# Patient Record
Sex: FEMALE | Race: WHITE | Employment: OTHER
[De-identification: names, ages, dates, MRNs, and addresses within clinical notes are randomized per-mention and may not be internally consistent; named-entity substitution may affect disease eponyms.]

---

## 2017-01-03 ENCOUNTER — CARE COORDINATION (OUTPATIENT)
Dept: CARE COORDINATION | Facility: CLINIC | Age: 67
End: 2017-01-03

## 2017-01-06 ENCOUNTER — CARE COORDINATION (OUTPATIENT)
Dept: CARE COORDINATION | Facility: CLINIC | Age: 67
End: 2017-01-06

## 2017-01-19 ENCOUNTER — TELEPHONE (OUTPATIENT)
Dept: FAMILY MEDICINE CLINIC | Facility: CLINIC | Age: 67
End: 2017-01-19

## 2017-02-02 ENCOUNTER — TELEPHONE (OUTPATIENT)
Dept: FAMILY MEDICINE CLINIC | Facility: CLINIC | Age: 67
End: 2017-02-02

## 2017-02-03 ENCOUNTER — OFFICE VISIT (OUTPATIENT)
Dept: FAMILY MEDICINE CLINIC | Facility: CLINIC | Age: 67
End: 2017-02-03

## 2017-02-03 VITALS
HEART RATE: 94 BPM | RESPIRATION RATE: 18 BRPM | DIASTOLIC BLOOD PRESSURE: 70 MMHG | OXYGEN SATURATION: 97 % | SYSTOLIC BLOOD PRESSURE: 117 MMHG

## 2017-02-03 DIAGNOSIS — T14.8XXA TENDON TEAR: Primary | ICD-10-CM

## 2017-02-03 DIAGNOSIS — I82.A11 ACUTE DEEP VEIN THROMBOSIS (DVT) OF AXILLARY VEIN OF RIGHT UPPER EXTREMITY (HCC): ICD-10-CM

## 2017-02-03 DIAGNOSIS — M54.16 LUMBAR RADICULAR PAIN: ICD-10-CM

## 2017-02-03 LAB
INTERNATIONAL NORMALIZATION RATIO, POC: 36.2
PROTHROMBIN TIME, POC: 3

## 2017-02-03 PROCEDURE — 85610 PROTHROMBIN TIME: CPT

## 2017-02-03 PROCEDURE — 99213 OFFICE O/P EST LOW 20 MIN: CPT

## 2017-02-03 ASSESSMENT — ENCOUNTER SYMPTOMS
COUGH: 1
BACK PAIN: 1

## 2017-02-09 ENCOUNTER — TELEPHONE (OUTPATIENT)
Dept: FAMILY MEDICINE CLINIC | Facility: CLINIC | Age: 67
End: 2017-02-09

## 2017-02-10 ENCOUNTER — TELEPHONE (OUTPATIENT)
Dept: FAMILY MEDICINE CLINIC | Facility: CLINIC | Age: 67
End: 2017-02-10

## 2017-02-16 ENCOUNTER — CARE COORDINATION (OUTPATIENT)
Dept: CARE COORDINATION | Facility: CLINIC | Age: 67
End: 2017-02-16

## 2017-02-17 ENCOUNTER — TELEPHONE (OUTPATIENT)
Dept: FAMILY MEDICINE CLINIC | Facility: CLINIC | Age: 67
End: 2017-02-17

## 2017-03-10 ENCOUNTER — CARE COORDINATION (OUTPATIENT)
Dept: CARE COORDINATION | Facility: CLINIC | Age: 67
End: 2017-03-10

## 2017-03-10 DIAGNOSIS — S12.9XXD: ICD-10-CM

## 2017-03-10 DIAGNOSIS — E78.00 PURE HYPERCHOLESTEROLEMIA: ICD-10-CM

## 2017-03-10 DIAGNOSIS — I82.621 ARM DVT (DEEP VENOUS THROMBOEMBOLISM), ACUTE, RIGHT (HCC): ICD-10-CM

## 2017-03-10 DIAGNOSIS — I10 ESSENTIAL HYPERTENSION: ICD-10-CM

## 2017-03-10 RX ORDER — ATORVASTATIN CALCIUM 20 MG/1
20 TABLET, FILM COATED ORAL NIGHTLY
Qty: 90 TABLET | Refills: 5 | Status: SHIPPED | OUTPATIENT
Start: 2017-03-10 | End: 2017-03-13 | Stop reason: SDUPTHER

## 2017-03-10 RX ORDER — LORAZEPAM 1 MG/1
1 TABLET ORAL 2 TIMES DAILY PRN
Qty: 60 TABLET | Refills: 0 | Status: SHIPPED | OUTPATIENT
Start: 2017-03-10 | End: 2017-04-25 | Stop reason: SDUPTHER

## 2017-03-10 RX ORDER — POTASSIUM CHLORIDE 20 MEQ/1
TABLET, EXTENDED RELEASE ORAL
Qty: 180 TABLET | Refills: 3 | Status: SHIPPED | OUTPATIENT
Start: 2017-03-10 | End: 2017-03-13 | Stop reason: SDUPTHER

## 2017-03-10 RX ORDER — CHLORTHALIDONE 25 MG/1
25 TABLET ORAL DAILY
Qty: 90 TABLET | Refills: 5 | Status: SHIPPED | OUTPATIENT
Start: 2017-03-10 | End: 2017-03-13 | Stop reason: SDUPTHER

## 2017-03-13 DIAGNOSIS — E78.00 PURE HYPERCHOLESTEROLEMIA: ICD-10-CM

## 2017-03-13 DIAGNOSIS — I10 ESSENTIAL HYPERTENSION: ICD-10-CM

## 2017-03-13 DIAGNOSIS — K21.9 GASTROESOPHAGEAL REFLUX DISEASE, ESOPHAGITIS PRESENCE NOT SPECIFIED: ICD-10-CM

## 2017-03-13 RX ORDER — POTASSIUM CHLORIDE 20 MEQ/1
TABLET, EXTENDED RELEASE ORAL
Qty: 180 TABLET | Refills: 3 | Status: SHIPPED | OUTPATIENT
Start: 2017-03-13 | End: 2018-10-22

## 2017-03-13 RX ORDER — CHLORTHALIDONE 25 MG/1
25 TABLET ORAL DAILY
Qty: 90 TABLET | Refills: 5 | Status: SHIPPED | OUTPATIENT
Start: 2017-03-13 | End: 2018-10-22

## 2017-03-13 RX ORDER — ATORVASTATIN CALCIUM 20 MG/1
20 TABLET, FILM COATED ORAL NIGHTLY
Qty: 90 TABLET | Refills: 5 | Status: SHIPPED | OUTPATIENT
Start: 2017-03-13

## 2017-03-13 RX ORDER — OMEPRAZOLE 20 MG/1
20 CAPSULE, DELAYED RELEASE ORAL DAILY
Qty: 90 CAPSULE | Refills: 5 | Status: SHIPPED | OUTPATIENT
Start: 2017-03-13 | End: 2019-09-05 | Stop reason: DRUGHIGH

## 2017-03-15 ENCOUNTER — TELEPHONE (OUTPATIENT)
Dept: FAMILY MEDICINE CLINIC | Age: 67
End: 2017-03-15

## 2017-03-29 ENCOUNTER — OFFICE VISIT (OUTPATIENT)
Dept: FAMILY MEDICINE CLINIC | Age: 67
End: 2017-03-29
Payer: MEDICARE

## 2017-03-29 VITALS
BODY MASS INDEX: 32.28 KG/M2 | SYSTOLIC BLOOD PRESSURE: 110 MMHG | HEART RATE: 78 BPM | WEIGHT: 194 LBS | OXYGEN SATURATION: 95 % | DIASTOLIC BLOOD PRESSURE: 78 MMHG

## 2017-03-29 DIAGNOSIS — M25.512 CHRONIC PAIN OF BOTH SHOULDERS: Primary | ICD-10-CM

## 2017-03-29 DIAGNOSIS — M25.511 CHRONIC PAIN OF BOTH SHOULDERS: Primary | ICD-10-CM

## 2017-03-29 DIAGNOSIS — M47.26 OSTEOARTHRITIS OF SPINE WITH RADICULOPATHY, LUMBAR REGION: Chronic | ICD-10-CM

## 2017-03-29 DIAGNOSIS — G89.29 CHRONIC PAIN OF BOTH SHOULDERS: Primary | ICD-10-CM

## 2017-03-29 PROCEDURE — 99213 OFFICE O/P EST LOW 20 MIN: CPT

## 2017-03-29 ASSESSMENT — ENCOUNTER SYMPTOMS: BACK PAIN: 1

## 2017-04-10 ENCOUNTER — HOSPITAL ENCOUNTER (OUTPATIENT)
Dept: NON INVASIVE DIAGNOSTICS | Age: 67
Discharge: HOME | End: 2017-04-10
Payer: MEDICARE

## 2017-04-10 ENCOUNTER — HOSPITAL ENCOUNTER (OUTPATIENT)
Dept: NON INVASIVE DIAGNOSTICS | Age: 67
End: 2017-04-10
Payer: MEDICARE

## 2017-04-10 ENCOUNTER — HOSPITAL ENCOUNTER (OUTPATIENT)
Age: 67
Discharge: HOME | End: 2017-04-10
Payer: MEDICARE

## 2017-04-10 DIAGNOSIS — E78.5 DYSLIPIDEMIA: Primary | ICD-10-CM

## 2017-04-10 LAB
CHOLESTEROL/HDL RATIO: 3.4
CHOLESTEROL: 158 MG/DL
HDLC SERPL-MCNC: 47 MG/DL
LDL CHOLESTEROL: 67 MG/DL (ref 0–130)
LV EF: 60 %
LVEF MODALITY: NORMAL
TRIGL SERPL-MCNC: 222 MG/DL
VLDLC SERPL CALC-MCNC: ABNORMAL MG/DL (ref 1–30)

## 2017-04-10 PROCEDURE — 78452 HT MUSCLE IMAGE SPECT MULT: CPT

## 2017-04-10 PROCEDURE — 80061 LIPID PANEL: CPT

## 2017-04-10 PROCEDURE — 3430000000 HC RX DIAGNOSTIC RADIOPHARMACEUTICAL: Performed by: INTERNAL MEDICINE

## 2017-04-10 PROCEDURE — 36415 COLL VENOUS BLD VENIPUNCTURE: CPT

## 2017-04-10 PROCEDURE — A9500 TC99M SESTAMIBI: HCPCS | Performed by: INTERNAL MEDICINE

## 2017-04-10 PROCEDURE — 93306 TTE W/DOPPLER COMPLETE: CPT

## 2017-04-10 RX ADMIN — Medication 32.4 MILLICURIE: at 12:35

## 2017-04-11 ENCOUNTER — HOSPITAL ENCOUNTER (OUTPATIENT)
Dept: NON INVASIVE DIAGNOSTICS | Age: 67
Discharge: HOME | End: 2017-04-11
Payer: MEDICARE

## 2017-04-11 PROCEDURE — 93225 XTRNL ECG REC<48 HRS REC: CPT

## 2017-04-11 PROCEDURE — A9500 TC99M SESTAMIBI: HCPCS | Performed by: INTERNAL MEDICINE

## 2017-04-11 PROCEDURE — 6360000002 HC RX W HCPCS: Performed by: FAMILY MEDICINE

## 2017-04-11 PROCEDURE — 93017 CV STRESS TEST TRACING ONLY: CPT

## 2017-04-11 PROCEDURE — 3430000000 HC RX DIAGNOSTIC RADIOPHARMACEUTICAL: Performed by: INTERNAL MEDICINE

## 2017-04-11 RX ADMIN — Medication 33.7 MILLICURIE: at 09:37

## 2017-04-11 RX ADMIN — REGADENOSON 0.4 MG: 0.08 INJECTION, SOLUTION INTRAVENOUS at 09:37

## 2017-04-13 ENCOUNTER — HOSPITAL ENCOUNTER (OUTPATIENT)
Dept: GENERAL RADIOLOGY | Age: 67
Discharge: HOME | End: 2017-04-13
Payer: MEDICARE

## 2017-04-13 ENCOUNTER — HOSPITAL ENCOUNTER (OUTPATIENT)
Age: 67
Discharge: HOME | End: 2017-04-13
Payer: MEDICARE

## 2017-04-13 DIAGNOSIS — M47.26 OSTEOARTHRITIS OF SPINE WITH RADICULOPATHY, LUMBAR REGION: Chronic | ICD-10-CM

## 2017-04-13 PROCEDURE — 73030 X-RAY EXAM OF SHOULDER: CPT

## 2017-04-18 ENCOUNTER — TELEPHONE (OUTPATIENT)
Dept: FAMILY MEDICINE CLINIC | Age: 67
End: 2017-04-18

## 2017-04-21 ENCOUNTER — CARE COORDINATION (OUTPATIENT)
Dept: CARE COORDINATION | Age: 67
End: 2017-04-21

## 2017-04-25 ENCOUNTER — OFFICE VISIT (OUTPATIENT)
Dept: FAMILY MEDICINE CLINIC | Age: 67
End: 2017-04-25
Payer: MEDICARE

## 2017-04-25 VITALS
SYSTOLIC BLOOD PRESSURE: 118 MMHG | DIASTOLIC BLOOD PRESSURE: 78 MMHG | WEIGHT: 196 LBS | HEART RATE: 63 BPM | OXYGEN SATURATION: 98 % | BODY MASS INDEX: 32.62 KG/M2

## 2017-04-25 DIAGNOSIS — G89.29 CHRONIC PAIN OF BOTH SHOULDERS: Primary | ICD-10-CM

## 2017-04-25 DIAGNOSIS — I82.621 ARM DVT (DEEP VENOUS THROMBOEMBOLISM), ACUTE, RIGHT (HCC): ICD-10-CM

## 2017-04-25 DIAGNOSIS — M25.512 CHRONIC PAIN OF BOTH SHOULDERS: Primary | ICD-10-CM

## 2017-04-25 DIAGNOSIS — M54.16 LUMBAR RADICULAR PAIN: ICD-10-CM

## 2017-04-25 DIAGNOSIS — Z23 NEED FOR PROPHYLACTIC VACCINATION WITH STREPTOCOCCUS PNEUMONIAE (PNEUMOCOCCUS) AND INFLUENZA VACCINES: ICD-10-CM

## 2017-04-25 DIAGNOSIS — S12.9XXD: ICD-10-CM

## 2017-04-25 DIAGNOSIS — M25.511 CHRONIC PAIN OF BOTH SHOULDERS: Primary | ICD-10-CM

## 2017-04-25 PROCEDURE — G0009 ADMIN PNEUMOCOCCAL VACCINE: HCPCS

## 2017-04-25 PROCEDURE — 90670 PCV13 VACCINE IM: CPT

## 2017-04-25 PROCEDURE — 99213 OFFICE O/P EST LOW 20 MIN: CPT

## 2017-04-25 RX ORDER — ADHESIVE BANDAGE 3/4"
1 BANDAGE TOPICAL 2 TIMES DAILY
Qty: 1 EACH | Refills: 0 | Status: SHIPPED | OUTPATIENT
Start: 2017-04-25

## 2017-04-25 RX ORDER — LORAZEPAM 1 MG/1
1 TABLET ORAL 2 TIMES DAILY PRN
Qty: 60 TABLET | Refills: 0 | Status: SHIPPED | OUTPATIENT
Start: 2017-04-25 | End: 2017-06-07 | Stop reason: SDUPTHER

## 2017-04-25 RX ORDER — NITROGLYCERIN 0.4 MG/1
TABLET SUBLINGUAL
COMMUNITY
Start: 2017-04-03 | End: 2019-09-05

## 2017-04-25 ASSESSMENT — ENCOUNTER SYMPTOMS
RESPIRATORY NEGATIVE: 1
BACK PAIN: 1

## 2017-05-02 DIAGNOSIS — I82.A11 DEEP VEIN THROMBOSIS (DVT) OF AXILLARY VEIN OF RIGHT UPPER EXTREMITY, UNSPECIFIED CHRONICITY (HCC): Primary | ICD-10-CM

## 2017-05-02 RX ORDER — WARFARIN SODIUM 2.5 MG/1
TABLET ORAL
Qty: 90 TABLET | Refills: 3 | Status: ON HOLD | OUTPATIENT
Start: 2017-05-02 | End: 2019-09-03 | Stop reason: HOSPADM

## 2017-05-24 ENCOUNTER — TELEPHONE (OUTPATIENT)
Dept: FAMILY MEDICINE CLINIC | Age: 67
End: 2017-05-24

## 2017-05-24 DIAGNOSIS — I82.A11 ACUTE DEEP VEIN THROMBOSIS (DVT) OF AXILLARY VEIN OF RIGHT UPPER EXTREMITY (HCC): ICD-10-CM

## 2017-05-24 DIAGNOSIS — I82.A11 ACUTE DEEP VEIN THROMBOSIS (DVT) OF AXILLARY VEIN OF RIGHT UPPER EXTREMITY (HCC): Primary | ICD-10-CM

## 2017-05-24 LAB
INR BLD: 2.5
PROTIME: NORMAL SECONDS

## 2017-05-25 ENCOUNTER — CARE COORDINATION (OUTPATIENT)
Dept: CARE COORDINATION | Age: 67
End: 2017-05-25

## 2017-06-02 ENCOUNTER — CARE COORDINATION (OUTPATIENT)
Dept: CARE COORDINATION | Age: 67
End: 2017-06-02

## 2017-06-07 ENCOUNTER — OFFICE VISIT (OUTPATIENT)
Dept: FAMILY MEDICINE CLINIC | Age: 67
End: 2017-06-07
Payer: MEDICARE

## 2017-06-07 VITALS
BODY MASS INDEX: 33.12 KG/M2 | DIASTOLIC BLOOD PRESSURE: 78 MMHG | HEART RATE: 78 BPM | OXYGEN SATURATION: 96 % | WEIGHT: 199 LBS | TEMPERATURE: 98 F | SYSTOLIC BLOOD PRESSURE: 118 MMHG

## 2017-06-07 DIAGNOSIS — G89.29 CHRONIC LOW BACK PAIN, UNSPECIFIED BACK PAIN LATERALITY, WITH SCIATICA PRESENCE UNSPECIFIED: Primary | ICD-10-CM

## 2017-06-07 DIAGNOSIS — M54.5 CHRONIC LOW BACK PAIN, UNSPECIFIED BACK PAIN LATERALITY, WITH SCIATICA PRESENCE UNSPECIFIED: Primary | ICD-10-CM

## 2017-06-07 DIAGNOSIS — I82.621 ARM DVT (DEEP VENOUS THROMBOEMBOLISM), ACUTE, RIGHT (HCC): ICD-10-CM

## 2017-06-07 DIAGNOSIS — S12.9XXD: ICD-10-CM

## 2017-06-07 PROCEDURE — 99213 OFFICE O/P EST LOW 20 MIN: CPT

## 2017-06-07 RX ORDER — HYDROCODONE BITARTRATE AND ACETAMINOPHEN 5; 325 MG/1; MG/1
TABLET ORAL
Qty: 90 TABLET | Refills: 0 | Status: SHIPPED | OUTPATIENT
Start: 2017-06-07 | End: 2017-07-10 | Stop reason: SDUPTHER

## 2017-06-07 RX ORDER — LORAZEPAM 1 MG/1
1 TABLET ORAL 2 TIMES DAILY PRN
Qty: 60 TABLET | Refills: 0 | Status: SHIPPED | OUTPATIENT
Start: 2017-06-07 | End: 2019-09-05 | Stop reason: DRUGHIGH

## 2017-07-10 DIAGNOSIS — G89.29 CHRONIC LOW BACK PAIN, UNSPECIFIED BACK PAIN LATERALITY, WITH SCIATICA PRESENCE UNSPECIFIED: ICD-10-CM

## 2017-07-10 DIAGNOSIS — I82.621 ARM DVT (DEEP VENOUS THROMBOEMBOLISM), ACUTE, RIGHT (HCC): ICD-10-CM

## 2017-07-10 DIAGNOSIS — M54.5 CHRONIC LOW BACK PAIN, UNSPECIFIED BACK PAIN LATERALITY, WITH SCIATICA PRESENCE UNSPECIFIED: ICD-10-CM

## 2017-07-10 DIAGNOSIS — S12.9XXD: ICD-10-CM

## 2017-07-18 ENCOUNTER — OFFICE VISIT (OUTPATIENT)
Dept: FAMILY MEDICINE CLINIC | Age: 67
End: 2017-07-18
Payer: MEDICARE

## 2017-07-18 VITALS
HEART RATE: 74 BPM | DIASTOLIC BLOOD PRESSURE: 80 MMHG | WEIGHT: 196 LBS | BODY MASS INDEX: 32.62 KG/M2 | SYSTOLIC BLOOD PRESSURE: 110 MMHG | OXYGEN SATURATION: 96 %

## 2017-07-18 DIAGNOSIS — M54.5 CHRONIC LOW BACK PAIN, UNSPECIFIED BACK PAIN LATERALITY, WITH SCIATICA PRESENCE UNSPECIFIED: Primary | ICD-10-CM

## 2017-07-18 DIAGNOSIS — S12.9XXD: ICD-10-CM

## 2017-07-18 DIAGNOSIS — E53.8 B12 DEFICIENCY: ICD-10-CM

## 2017-07-18 DIAGNOSIS — I82.621 ARM DVT (DEEP VENOUS THROMBOEMBOLISM), ACUTE, RIGHT (HCC): ICD-10-CM

## 2017-07-18 DIAGNOSIS — G89.29 CHRONIC LOW BACK PAIN, UNSPECIFIED BACK PAIN LATERALITY, WITH SCIATICA PRESENCE UNSPECIFIED: Primary | ICD-10-CM

## 2017-07-18 PROBLEM — I82.629 ARM DVT (DEEP VENOUS THROMBOEMBOLISM), ACUTE (HCC): Status: ACTIVE | Noted: 2017-07-18

## 2017-07-18 LAB
INTERNATIONAL NORMALIZATION RATIO, POC: 1.4
PROTHROMBIN TIME, POC: 16.7

## 2017-07-18 PROCEDURE — 85610 PROTHROMBIN TIME: CPT

## 2017-07-18 PROCEDURE — 99213 OFFICE O/P EST LOW 20 MIN: CPT

## 2017-07-18 RX ORDER — HYDROCODONE BITARTRATE AND ACETAMINOPHEN 5; 325 MG/1; MG/1
TABLET ORAL
Qty: 90 TABLET | Refills: 0 | Status: SHIPPED | OUTPATIENT
Start: 2017-07-18 | End: 2019-04-23

## 2017-07-18 RX ORDER — CYANOCOBALAMIN 1000 UG/ML
1000 INJECTION INTRAMUSCULAR; SUBCUTANEOUS
Qty: 1 ML | Refills: 5 | Status: ON HOLD | OUTPATIENT
Start: 2017-07-18 | End: 2019-09-03 | Stop reason: HOSPADM

## 2017-07-18 RX ORDER — HYDROCODONE BITARTRATE AND ACETAMINOPHEN 5; 325 MG/1; MG/1
TABLET ORAL
Qty: 90 TABLET | Refills: 0 | Status: SHIPPED | OUTPATIENT
Start: 2017-07-18 | End: 2017-07-18 | Stop reason: SDUPTHER

## 2017-07-18 ASSESSMENT — ENCOUNTER SYMPTOMS
NAUSEA: 0
BACK PAIN: 1
EYE REDNESS: 0
DIARRHEA: 0
EYE DISCHARGE: 0
RHINORRHEA: 0
COUGH: 0
WHEEZING: 0
SHORTNESS OF BREATH: 0
VOMITING: 0
SORE THROAT: 0
ABDOMINAL PAIN: 0

## 2017-09-01 ENCOUNTER — HOSPITAL ENCOUNTER (OUTPATIENT)
Dept: CARDIAC CATH/INVASIVE PROCEDURES | Age: 67
Discharge: HOME | End: 2017-09-01
Payer: MEDICARE

## 2017-09-01 VITALS
OXYGEN SATURATION: 95 % | HEART RATE: 71 BPM | SYSTOLIC BLOOD PRESSURE: 91 MMHG | WEIGHT: 197 LBS | DIASTOLIC BLOOD PRESSURE: 54 MMHG | BODY MASS INDEX: 32.82 KG/M2 | TEMPERATURE: 97.9 F | HEIGHT: 65 IN | RESPIRATION RATE: 17 BRPM

## 2017-09-01 LAB
GFR NON-AFRICAN AMERICAN: >60 ML/MIN
GFR SERPL CREATININE-BSD FRML MDRD: >60 ML/MIN
GFR SERPL CREATININE-BSD FRML MDRD: NORMAL ML/MIN/{1.73_M2}
GLUCOSE BLD-MCNC: 98 MG/DL (ref 74–100)
PLATELET # BLD: 244 K/UL (ref 140–450)
POC CHLORIDE: 103 MMOL/L (ref 98–107)
POC CREATININE: 0.71 MG/DL (ref 0.51–1.19)
POC HEMATOCRIT: 41 % (ref 36–46)
POC HEMOGLOBIN: 13.9 G/DL (ref 12–16)
POC INR: 1.2
POC IONIZED CALCIUM: 1.14 MMOL/L (ref 1.15–1.33)
POC LACTIC ACID: 0.94 MMOL/L (ref 0.56–1.39)
POC POTASSIUM: 3.5 MMOL/L (ref 3.5–4.5)
POC SODIUM: 143 MMOL/L (ref 138–146)
PROTHROMBIN TIME, POC: 14.6 SEC (ref 10.4–14.2)

## 2017-09-01 PROCEDURE — 7100000010 HC PHASE II RECOVERY - FIRST 15 MIN

## 2017-09-01 PROCEDURE — C1725 CATH, TRANSLUMIN NON-LASER: HCPCS

## 2017-09-01 PROCEDURE — C1894 INTRO/SHEATH, NON-LASER: HCPCS

## 2017-09-01 PROCEDURE — 83605 ASSAY OF LACTIC ACID: CPT

## 2017-09-01 PROCEDURE — C1760 CLOSURE DEV, VASC: HCPCS

## 2017-09-01 PROCEDURE — 6360000002 HC RX W HCPCS

## 2017-09-01 PROCEDURE — 93458 L HRT ARTERY/VENTRICLE ANGIO: CPT | Performed by: INTERNAL MEDICINE

## 2017-09-01 PROCEDURE — C1769 GUIDE WIRE: HCPCS

## 2017-09-01 PROCEDURE — 84132 ASSAY OF SERUM POTASSIUM: CPT

## 2017-09-01 PROCEDURE — 84295 ASSAY OF SERUM SODIUM: CPT

## 2017-09-01 PROCEDURE — 6370000000 HC RX 637 (ALT 250 FOR IP)

## 2017-09-01 PROCEDURE — 7100000011 HC PHASE II RECOVERY - ADDTL 15 MIN

## 2017-09-01 PROCEDURE — 82565 ASSAY OF CREATININE: CPT

## 2017-09-01 PROCEDURE — 85014 HEMATOCRIT: CPT

## 2017-09-01 PROCEDURE — 82435 ASSAY OF BLOOD CHLORIDE: CPT

## 2017-09-01 PROCEDURE — 82330 ASSAY OF CALCIUM: CPT

## 2017-09-01 PROCEDURE — 82947 ASSAY GLUCOSE BLOOD QUANT: CPT

## 2017-09-01 PROCEDURE — 85049 AUTOMATED PLATELET COUNT: CPT

## 2017-09-01 PROCEDURE — 85610 PROTHROMBIN TIME: CPT

## 2017-09-01 RX ORDER — ONDANSETRON 2 MG/ML
4 INJECTION INTRAMUSCULAR; INTRAVENOUS EVERY 6 HOURS PRN
Status: DISCONTINUED | OUTPATIENT
Start: 2017-09-01 | End: 2017-09-02 | Stop reason: HOSPADM

## 2017-09-01 RX ORDER — SODIUM CHLORIDE 0.9 % (FLUSH) 0.9 %
10 SYRINGE (ML) INJECTION EVERY 12 HOURS SCHEDULED
Status: DISCONTINUED | OUTPATIENT
Start: 2017-09-01 | End: 2017-09-02 | Stop reason: HOSPADM

## 2017-09-01 RX ORDER — SODIUM CHLORIDE 9 MG/ML
INJECTION, SOLUTION INTRAVENOUS CONTINUOUS
Status: DISCONTINUED | OUTPATIENT
Start: 2017-09-01 | End: 2017-09-02 | Stop reason: HOSPADM

## 2017-09-01 RX ORDER — CALCIUM CARBONATE 500(1250)
500 TABLET ORAL DAILY
COMMUNITY
End: 2019-09-05 | Stop reason: ALTCHOICE

## 2017-09-01 RX ORDER — SODIUM CHLORIDE 0.9 % (FLUSH) 0.9 %
10 SYRINGE (ML) INJECTION PRN
Status: DISCONTINUED | OUTPATIENT
Start: 2017-09-01 | End: 2017-09-02 | Stop reason: HOSPADM

## 2017-09-01 RX ORDER — ACETAMINOPHEN 325 MG/1
650 TABLET ORAL EVERY 4 HOURS PRN
Status: DISCONTINUED | OUTPATIENT
Start: 2017-09-01 | End: 2017-09-02 | Stop reason: HOSPADM

## 2017-09-01 RX ADMIN — ACETAMINOPHEN 650 MG: 325 TABLET ORAL at 14:38

## 2017-09-01 RX ADMIN — SODIUM CHLORIDE: 9 INJECTION, SOLUTION INTRAVENOUS at 11:12

## 2017-09-01 ASSESSMENT — PAIN SCALES - GENERAL
PAINLEVEL_OUTOF10: 3
PAINLEVEL_OUTOF10: 6

## 2017-09-01 ASSESSMENT — PAIN DESCRIPTION - LOCATION: LOCATION: BACK

## 2017-09-01 ASSESSMENT — PAIN DESCRIPTION - DESCRIPTORS: DESCRIPTORS: ACHING

## 2017-09-01 ASSESSMENT — PAIN DESCRIPTION - PAIN TYPE: TYPE: CHRONIC PAIN

## 2018-07-11 ENCOUNTER — ANTI-COAG VISIT (OUTPATIENT)
Dept: PHARMACY | Age: 68
End: 2018-07-11

## 2018-07-11 DIAGNOSIS — Z79.01 LONG-TERM (CURRENT) USE OF ANTICOAGULANTS: ICD-10-CM

## 2018-07-11 NOTE — PROGRESS NOTES
Dr Jhon Lopez has been managing INR as patient does not like to drive into clinic. Patient discharged from clinic.   Discharge letter faxed to Dr Narendra Johnson, PharmD 7/11/2018 1:43 PM

## 2018-07-30 ENCOUNTER — HOSPITAL ENCOUNTER (OUTPATIENT)
Dept: PAIN MANAGEMENT | Age: 68
Discharge: HOME | End: 2018-07-30
Payer: MEDICARE

## 2018-07-30 VITALS
BODY MASS INDEX: 31.49 KG/M2 | DIASTOLIC BLOOD PRESSURE: 75 MMHG | HEART RATE: 71 BPM | HEIGHT: 65 IN | RESPIRATION RATE: 20 BRPM | OXYGEN SATURATION: 97 % | SYSTOLIC BLOOD PRESSURE: 135 MMHG | WEIGHT: 189 LBS | TEMPERATURE: 97.5 F

## 2018-07-30 DIAGNOSIS — M46.1 SACROILIITIS (HCC): Primary | ICD-10-CM

## 2018-07-30 DIAGNOSIS — M47.26 OSTEOARTHRITIS OF SPINE WITH RADICULOPATHY, LUMBAR REGION: ICD-10-CM

## 2018-07-30 DIAGNOSIS — M47.816 LUMBAR FACET ARTHROPATHY: ICD-10-CM

## 2018-07-30 DIAGNOSIS — M54.16 LUMBAR RADICULAR PAIN: ICD-10-CM

## 2018-07-30 DIAGNOSIS — M51.36 DDD (DEGENERATIVE DISC DISEASE), LUMBAR: ICD-10-CM

## 2018-07-30 DIAGNOSIS — M96.1 FAILED BACK SYNDROME: ICD-10-CM

## 2018-07-30 PROCEDURE — 99204 OFFICE O/P NEW MOD 45 MIN: CPT

## 2018-07-30 PROCEDURE — 99204 OFFICE O/P NEW MOD 45 MIN: CPT | Performed by: PAIN MEDICINE

## 2018-07-30 PROCEDURE — 80307 DRUG TEST PRSMV CHEM ANLYZR: CPT

## 2018-07-30 ASSESSMENT — ENCOUNTER SYMPTOMS
NAUSEA: 0
DIARRHEA: 1
BLURRED VISION: 0
CONSTIPATION: 0
VOMITING: 0
RESPIRATORY NEGATIVE: 1
BACK PAIN: 1
BOWEL INCONTINENCE: 0
HEARTBURN: 1

## 2018-07-30 ASSESSMENT — PAIN DESCRIPTION - PAIN TYPE: TYPE: CHRONIC PAIN

## 2018-07-30 ASSESSMENT — PAIN SCALES - GENERAL: PAINLEVEL_OUTOF10: 4

## 2018-07-30 ASSESSMENT — PAIN DESCRIPTION - ORIENTATION: ORIENTATION: MID;LOWER

## 2018-07-30 ASSESSMENT — PAIN DESCRIPTION - FREQUENCY: FREQUENCY: CONTINUOUS

## 2018-07-30 ASSESSMENT — PAIN DESCRIPTION - PROGRESSION: CLINICAL_PROGRESSION: GRADUALLY WORSENING

## 2018-07-30 ASSESSMENT — PAIN DESCRIPTION - ONSET: ONSET: ON-GOING

## 2018-07-30 ASSESSMENT — PAIN DESCRIPTION - LOCATION: LOCATION: BACK

## 2018-07-30 NOTE — PROGRESS NOTES
Back  Pain Orientation: Mid, Lower  Pain Radiating Towards: radiates through hips and down bilateral legsto toes  Pain Descriptors: Constant, Penetrating, Tightness  Pain Frequency: Continuous  Pain Onset: On-going  Clinical Progression: Gradually worsening  Effect of Pain on Daily Activities: walking \"legs just don't want to go\" steps are difficult, always uses cane, has walker at home, also uses WC when legs are really tired,   Patient's Stated Pain Goal: 1 (increase activity and decrease pain)  Pain Intervention(s): Medication (see eMar), Heat applied, Cold applied, Repositioned, Rest, Elevation                    ADVERSE MEDICATION EFFECTS:   Constipation: no  Bowel Regimen: No:   Diet: common adult  Appetite:  ok  Sedation:  no  Urinary Retention: no    FOCUSED PAIN SCALE:  Highest : 10  Lowest :4  Average: Range-6  When and What  was your last procedure:    1 back surgeries in Oct 2016, Jan 2017  Was your procedure effective:  yes    ACTIVITY/SOCIAL/EMOTIONAL:  Sleep Pattern: 4 hours per night. nightime awakenings  Energy Level:  Tired/Fatigued  Currently attending Physical Therapy:  No  Home Exercises: daily   Stretch and stregthening  Mobility: uses cane/walker and wheelchair  Currently seeing a Psychiatrist or Psychologist:  No  Emotional Issues: normal   Mood: appropriate     ABERRANT BEHAVIORS SINCE LAST VISIT:  Have you ever been treated in another Pain Clinic no  Refills for prescriptions appropriate: not applicable  Lost rx/pills: not applicable  Taking more medication than prescribed:  not applicable  Are you receiving PAIN medications from  other doctors: not applicable  Last Urine/Serum Drug Screen :  Was Serum/UDS as anticipated?  not applicable  Brought pill bottles in :not applicable   Was Pill count appropriate? :not applicable   Are currently pregnant? not applicable  Recent ER visits: No             Past Medical History      Diagnosis Date    Anxiety     Asthma     Chronic back pain     Depression     YEARS AGO NO RECENT PROBLEMS    Difficult intravenous access     AT TIMES    Diverticulitis     [facility]     Right upper arm    Dyslexia     GERD (gastroesophageal reflux disease) 2014    ON RX    Headache(784.0)     Hx of blood clots 2001    LT UPPER LEG TREATED WITH INJECTIONS THEN ORAL BLOOD THINNER FOR 1 YR    Hyperlipidemia 2014    ON RX    Hypertension 2014    ON RX    Migraine     Mitral valve prolapse 1970    NO SYMPTOMS    Sleep apnea 2006    ON MACHINE HAD SURGERY-NOW RESOLVED    Wears dentures     UPPER    Wears glasses        Surgical History  Past Surgical History:   Procedure Laterality Date    BACK SURGERY      LUMBAR UNSURE WHEN    BACK SURGERY  10/17/2016    hardware removal, revision posterior instrumentation    CERVICAL FUSION  2013    Anterior    COLONOSCOPY  7/16/2012    normal    HYSTERECTOMY  1986    TOTAL    SIGMOIDOSCOPY  6/20/2012    normal    SINUS SURGERY      SPINE SURGERY  2005    LUMBAR    TONSILLECTOMY  2002    UPPP, TURBINOPLASY    UPPER GASTROINTESTINAL ENDOSCOPY  8/14/2013    Bravo done, see note, hiatal hernia, Grade 1 erosive esophagitis    VARICOSE VEIN SURGERY Bilateral        Medications  Current Outpatient Prescriptions   Medication Sig Dispense Refill    calcium carbonate (OSCAL) 500 MG TABS tablet Take 500 mg by mouth daily      HYDROcodone-acetaminophen (NORCO) 5-325 MG per tablet ONE  TID  PRN.  Earliest Fill Date: 7/18/17 90 tablet 0    LORazepam (ATIVAN) 1 MG tablet Take 1 tablet by mouth 2 times daily as needed for Anxiety 60 tablet 0    warfarin (COUMADIN) 2.5 MG tablet 1 pill po daily 90 tablet 3    metoprolol tartrate (LOPRESSOR) 25 MG tablet TAKE 1 TABLET TWICE DAILY 180 tablet 3    atorvastatin (LIPITOR) 20 MG tablet Take 1 tablet by mouth nightly 90 tablet 5    omeprazole (PRILOSEC) 20 MG delayed release capsule Take 1 capsule by mouth Daily 90 capsule 5    aspirin 81 MG tablet Take 81 mg by mouth daily LAST DOSE 01/20/2017      cyanocobalamin 1000 MCG/ML injection Inject 1 mL into the muscle every 30 days 1 mL 5    nitroGLYCERIN (NITROSTAT) 0.4 MG SL tablet       Blood Pressure Monitoring (BLOOD PRESSURE CUFF) MISC 1 each by Does not apply route 2 times daily 1 each 0    potassium chloride (KLOR-CON M) 20 MEQ extended release tablet TAKE 1 TABLET TWICE DAILY 180 tablet 3    chlorthalidone (HYGROTON) 25 MG tablet Take 1 tablet by mouth daily 90 tablet 5    albuterol (PROVENTIL) (2.5 MG/3ML) 0.083% nebulizer solution INHALE THE CONTENTS OF 1 VIAL VIA NEBULIZER EVERY 6 HOURS AS NEEDED  FOR  WHEEZING (SUBSTITUTED FOR PROVENTIL) 360 mL 1     No current facility-administered medications for this encounter. Allergies  Xarelto [rivaroxaban]; Morphine; Seasonal; and Adhesive tape    Family History  family history includes Asthma in her brother; Cancer in her brother, brother, brother, father, maternal grandmother, and paternal grandmother; Heart Disease in her mother. Social History  Social History     Social History    Marital status:      Spouse name: N/A    Number of children: N/A    Years of education: N/A     Occupational History    Retired      Social History Main Topics    Smoking status: Never Smoker    Smokeless tobacco: Never Used    Alcohol use No    Drug use: No    Sexual activity: Not on file     Other Topics Concern    Not on file     Social History Narrative    No narrative on file      reports that she does not use drugs. REVIEW OF SYSTEMS:  Review of Systems   Constitutional: Positive for malaise/fatigue. Negative for chills and fever. HENT: Negative. Negative for hearing loss. Eyes: Negative for blurred vision. Cataracts   Respiratory: Negative. Cardiovascular: Negative. Negative for chest pain. Gastrointestinal: Positive for diarrhea and heartburn. Negative for bowel incontinence, constipation, nausea and vomiting.    Genitourinary: Negative. Negative for bladder incontinence, frequency and urgency. Musculoskeletal: Positive for back pain, falls and joint pain. Skin: Negative. Neurological: Positive for tingling, weakness and headaches. Endo/Heme/Allergies: Negative. Psychiatric/Behavioral:        \"ptsd\"  Deaths of  and son            GENERAL PHYSICAL EXAM:  Vitals: /75   Pulse 71   Temp 97.5 °F (36.4 °C) (Oral)   Resp 20   Ht 5' 5\" (1.651 m)   Wt 189 lb (85.7 kg)   LMP 10/05/1986 (Approximate)   SpO2 97%   BMI 31.45 kg/m² , Body mass index is 31.45 kg/m². Physical Exam   Constitutional: She is oriented to person, place, and time. She appears well-developed and well-nourished. HENT:   Head: Normocephalic and atraumatic. Eyes: Pupils are equal, round, and reactive to light. Neck: Normal range of motion. Neck supple. Cardiovascular: Normal rate and regular rhythm. Pulmonary/Chest: Effort normal and breath sounds normal.   Abdominal: Soft. She exhibits no distension. Neurological: She is alert and oriented to person, place, and time. She has normal reflexes. Skin: Skin is warm and dry. Psychiatric: She has a normal mood and affect. Her behavior is normal.    Back Exam     Tenderness   The patient is experiencing tenderness in the sacroiliac and lumbar. Range of Motion   Back extension: limited and painful. Back flexion: limited and painful. Muscle Strength   The patient has normal back strength.     Tests   Straight leg raise right: positive  Straight leg raise left: positive    Reflexes   Patellar: normal  Achilles: normal  Biceps: normal    Other   Sensation: normal  Gait: normal           Gait : normal   Surgical Scar --Present--midline  Alignment of her shoulders, scapulae and iliac crests----symmetric  Lumbar lordosis-----------Decreased  Palpation in the paraspinal revealed   Right------------------------moderate tenderness   Left--------------------------moderate tenderness  SI joints   Right----------------------- severe tenderness   Left--------------------------severe tenderness   Loc's test -------------- Right side---positive                                           Left side-----positive      Nurses Notes and Vital Signs reviewed.     DATA  Labs:  Benzodiazepines   Date Value Ref Range Status   10/15/2012 NEGATIVE NEG Final     Comment:           (Positive cutoff 200 ng/mL)                 Benzodiazepine Screen, Urine   Date Value Ref Range Status   09/03/2013 NEGATIVE NEG Final     Comment:           (Positive cutoff 200 ng/mL)                    Imaging:  Radiology Images and Reports reviewed where indicated and necessary  FINDINGS:   Posterior lumbar fusion is identified from L2 through S1.  There is   additional fusion of left SI joint.  Posterior skin staples are noted.       The alignment is intact.  No acute fracture is identified.  Moderate diffuse   degenerative disc disease is noted       Laminectomy changes are identified at L4           Impression   Uncomplicated posterior fusion from L2 through S1 as well as the left SI   joint.  No significant change is evident.       No evidence of acute fracture       Patient Active Problem List   Diagnosis    Chronic back pain    GERD (gastroesophageal reflux disease)    Hyperlipidemia    Hypertension    Failed back syndrome    DDD (degenerative disc disease), lumbar    Lumbar facet joint pain    Lumbar radicular pain    DDD (degenerative disc disease), cervical    Cervical spondylosis  S/P Cervical fusion    Osteoarthritis of spine with radiculopathy, lumbar region    Lumbar facet arthropathy    Menopausal and postmenopausal disorder    Anemia, pernicious    Hiatal hernia    Anxiety    Unstable angina (HCC)    B12 deficiency    Varicose veins of both lower extremities    S/P lumbar fusion    POONAM (obstructive sleep apnea)    Pure hypercholesterolemia    Deep vein thrombosis (DVT) of axillary vein of right upper extremity (HCC)    Paget-Schroetter syndrome    Acute deep vein thrombosis (DVT) of axillary vein of right upper extremity (HCC)    Long-term (current) use of anticoagulants    Fracture of spine, cervical, without spinal cord injury, closed (HCC)    Tendon tear    Chronic pain of both shoulders    Need for prophylactic vaccination with Streptococcus pneumoniae (Pneumococcus) and Influenza vaccines    Arm DVT (deep venous thromboembolism), acute ([address]        ASSESSMENT    Giovani Lawson is a 79 y.o. female with chronic lumbar back pain.     -Degenerative disc disease. Lumbar  -Lumbar radicular pain  -Cervical Spondylosis  -Failed back syndrome  -S/P lumbar fusion  - Gluteal medius avulsion   - Osteoarthritis of spine with radiculopathy, lumbar region     PLAN  - We will perform bilateral SI injections, with left SI injection first as pain is worse on this side. Right SI at later date.  -patient counseled on importance of continuing home exercises      The following treatment plan was developed after discussion with patient:    Patient aPatient  has axial or localized   to Bilateral  Sacroiliac joint  Palpation showed tenderness over the SI joint . Patient failed all conservative treatment plans which included NSAIDS, activity modifications,home exercises, over the counter remedies, ice, heat and Physical / Chiropractic therapies. Patient's symptoms are gradually worsening with current treatment, interfering with sleep and activities of daily living. We discussed Left  Sacroiliac joint injectionsand re-evaluate symptoms in two weeks at an office visit. Patient agreed to the procedure which will be scheduled as soon as possible. All questions satisfactorily answered by me with the use of a spine model. Patient's   [x] x-ray    [] CT scan    [x] MRI  Were/was  Reviewed. These findings are consistent with the patient's symptoms and physical examination.       [] No x-ray following management options: Interventions as needed, medication management as appropriate, future visits, activity modification, heat/ice as needed, Urine drug screen as required. [x]The patient's questions were answered to the best of my abilities. This note was created using voice recognition software. There may be inaccuracies of transcription  that are inadvertently overlooked prior to the signature. There is any questions about the transcription please contact me. Electronically signed by Chanell Vick MD on 7/30/2018 at 1:24 PM      NAME:  Quan Patel  MRN: [id number]   YOB: 1950   Date: 7/30/2018   Age: 79 y.o. Gender: female       Quan Patel is 79 y.o. ,female, referred because of Lower Back Pain        I Performed a history and physical examination of the patient and discussed management with the resident/ Physician Assistant/ Nurse Practitioner. I reviewed the Resident/ Physician Assistant/ Nurse Practitioner note and agree with the documented findings and plan of care. Any areas of disagreement are noted on the chart. I was present for any key portions of any procedure. I have documented in the chart those procedures where I was not present during key Portions. I agree with the chief complaint, past medical history, past surgical history, Social & family history, Allergies, medications as documented unless noted below. I have personally evaluated this patient and have completed at least one if not all key elements of the (History, physical exam and plan of care). Additional findings are added to the note above    Diagnosis:   1. Sacroiliitis (HCC)-bilateral    2. Osteoarthritis of spine with radiculopathy, lumbar region    3. Lumbar facet arthropathy    4. Failed back syndrome    5. DDD (degenerative disc disease), lumbar    6.  Lumbar radicular pain        Plan of Care:   Patient is extremely tender over the SI joints bilaterally she also has a positive

## 2018-08-07 LAB
6-ACETYLMORPHINE, UR: NOT DETECTED
7-AMINOCLONAZEPAM, URINE: NOT DETECTED
ALPHA-OH-ALPRAZ, URINE: NOT DETECTED
ALPRAZOLAM, URINE: NOT DETECTED
AMPHETAMINES, URINE: NOT DETECTED
BARBITURATES, URINE: NOT DETECTED
BENZOYLECGONINE, UR: NOT DETECTED
BUPRENORPHINE URINE: NOT DETECTED
CARISOPRODOL, UR: NOT DETECTED
CLONAZEPAM, URINE: NOT DETECTED
CODEINE, URINE: NOT DETECTED
CREATININE URINE: 32.1 MG/DL (ref 20–400)
DIAZEPAM, URINE: NOT DETECTED
DRUGS EXPECTED, UR: NORMAL
EER HI RES INTERP UR: NORMAL
ETHYL GLUCURONIDE UR: NOT DETECTED
FENTANYL URINE: NOT DETECTED
HYDROCODONE, URINE: PRESENT
HYDROMORPHONE, URINE: NOT DETECTED
LORAZEPAM, URINE: PRESENT
MARIJUANA METAB, UR: NOT DETECTED
MDA, UR: NOT DETECTED
MDEA, EVE, UR: NOT DETECTED
MDMA URINE: NOT DETECTED
MEPERIDINE METAB, UR: NOT DETECTED
METHADONE, URINE: NOT DETECTED
METHAMPHETAMINE, URINE: NOT DETECTED
METHYLPHENIDATE: NOT DETECTED
MIDAZOLAM, URINE: NOT DETECTED
MORPHINE URINE: NOT DETECTED
NORBUPRENORPHINE, URINE: NOT DETECTED
NORDIAZEPAM, URINE: NOT DETECTED
NORFENTANYL, URINE: NOT DETECTED
NORHYDROCODONE, URINE: PRESENT
NOROXYCODONE, URINE: NOT DETECTED
NOROXYMORPHONE, URINE: NOT DETECTED
OXAZEPAM, URINE: NOT DETECTED
OXYCODONE URINE: NOT DETECTED
OXYMORPHONE, URINE: NOT DETECTED
PAIN MANAGEMENT DRUG PANEL INTERP, URINE: NORMAL
PAIN MGT DRUG PANEL, HI RES, UR: NORMAL
PCP,URINE: NOT DETECTED
PHENTERMINE, UR: NOT DETECTED
PROPOXYPHENE, URINE: NOT DETECTED
TAPENTADOL, URINE: NOT DETECTED
TAPENTADOL-O-SULFATE, URINE: NOT DETECTED
TEMAZEPAM, URINE: NOT DETECTED
TRAMADOL, URINE: NOT DETECTED
ZOLPIDEM, URINE: NOT DETECTED

## 2018-08-13 ENCOUNTER — HOSPITAL ENCOUNTER (OUTPATIENT)
Dept: PAIN MANAGEMENT | Age: 68
Discharge: HOME | End: 2018-08-13
Payer: MEDICARE

## 2018-08-13 ENCOUNTER — HOSPITAL ENCOUNTER (OUTPATIENT)
Dept: GENERAL RADIOLOGY | Age: 68
Discharge: HOME | End: 2018-08-15
Payer: MEDICARE

## 2018-08-13 VITALS
HEART RATE: 68 BPM | HEIGHT: 65 IN | BODY MASS INDEX: 31.49 KG/M2 | SYSTOLIC BLOOD PRESSURE: 130 MMHG | DIASTOLIC BLOOD PRESSURE: 76 MMHG | RESPIRATION RATE: 18 BRPM | OXYGEN SATURATION: 98 % | WEIGHT: 189 LBS | TEMPERATURE: 97.7 F

## 2018-08-13 DIAGNOSIS — M46.1 SACROILIITIS (HCC): Primary | ICD-10-CM

## 2018-08-13 DIAGNOSIS — M46.1 SACROILIITIS (HCC): ICD-10-CM

## 2018-08-13 PROCEDURE — 3209999900 FLUORO FOR SURGICAL PROCEDURES

## 2018-08-13 PROCEDURE — 6360000004 HC RX CONTRAST MEDICATION

## 2018-08-13 PROCEDURE — 27096 INJECT SACROILIAC JOINT: CPT | Performed by: PAIN MEDICINE

## 2018-08-13 PROCEDURE — G0260 INJ FOR SACROILIAC JT ANESTH: HCPCS

## 2018-08-13 PROCEDURE — 6360000002 HC RX W HCPCS: Performed by: PAIN MEDICINE

## 2018-08-13 PROCEDURE — 6360000002 HC RX W HCPCS

## 2018-08-13 RX ORDER — MIDAZOLAM HYDROCHLORIDE 1 MG/ML
INJECTION INTRAMUSCULAR; INTRAVENOUS
Status: COMPLETED | OUTPATIENT
Start: 2018-08-13 | End: 2018-08-13

## 2018-08-13 RX ADMIN — MIDAZOLAM 2 MG: 1 INJECTION INTRAMUSCULAR; INTRAVENOUS at 10:27

## 2018-08-13 ASSESSMENT — PAIN SCALES - GENERAL
PAINLEVEL_OUTOF10: 2
PAINLEVEL_OUTOF10: 2

## 2018-08-13 ASSESSMENT — PAIN - FUNCTIONAL ASSESSMENT: PAIN_FUNCTIONAL_ASSESSMENT: 0-10

## 2018-08-13 NOTE — PROCEDURES
Pre-Procedure Note    Patient Name: Lucy Bardales   YOB: 1950  Room/Bed: Room/bed info not found  Medical Record Number: [id number]  Date: 8/13/2018       Indication:    1. Sacroiliitis (HCC)-bilateral        Consent: On file. Vital Signs:   Vitals:    08/13/18 1034   BP: 124/73   Pulse: 69   Resp: 14   Temp:    SpO2: 97%       Past Medical History:   has a past medical history of Anxiety; Asthma; Chronic back pain; Depression; Difficult intravenous access; Diverticulitis; DVT (deep venous thrombosis) ([facility] [address] Dyslexia; GERD (gastroesophageal reflux disease); Headache(784.0); Hx of blood clots; Hyperlipidemia; Hypertension; Migraine; Mitral valve prolapse; Sleep apnea; Wears dentures; and Wears glasses. Past Surgical History:   has a past surgical history that includes Sigmoidoscopy (6/20/2012); Spine surgery (2005); Tonsillectomy (2002); Varicose vein surgery (Bilateral); cervical fusion (2013); sinus surgery; Upper gastrointestinal endoscopy (8/14/2013); Hysterectomy (1986); Colonoscopy (7/16/2012); back surgery; and back surgery (10/17/2016). Pre-Sedation Documentation and Exam:   Vital signs have been reviewed (see flow sheet for vitals). Mallampati Airway Assessment:  normal    ASA Classification:  Class 3 - A patient with severe systemic disease that limits activity but is not incapacitating    Sedation/ Anesthesia Plan:   intravenous sedation  as needed. Medications Planned:   midazolam (Versed) / Fentanyl  Intravenously  as needed. Patient is an appropriate candidate for plan of sedation: yes  Patient's History and Physical examination was reviewed and there is no change. Electronically signed by Ngoc Sepulveda MD on 8/13/2018 at 10:42 AM}        Preoperative Diagnosis:  Right sacroiliitis. Postoperative Diagnosis: Right  Sacroiliitis. Procedure Performed:  Right sacroiliac joint injection under fluoroscopy guidance with IV sedation.     Indication for the Procedure: The patient failed conservative management  for pain in low back. The patient is tender over the Right SI joint. Loc's test is positive on the Right side. As patient is not responding to conservative management and pain is interfering with activities of daily living we decided to proceed with SI joint injection. The procedure and risks were discussed with the patient and an informed consent was obtained. Current Pain Assessment  Pain Assessment  Pain Assessment: 0-10  Pain Level: 6  Pain Type: Chronic pain  Pain Location: Back, Hip  Pain Orientation: Mid, Lower  Pain Radiating Towards: hips through legs to ankle. Pain Descriptors: Constant, Tightness, Penetrating  Pain Frequency: Continuous  Effect of Pain on Daily Activities: walking and steps is great difficulty, uses cane, WC for longer distances  Patient's Stated Pain Goal: 2 (increase activty decrease pain)  Pain Intervention(s): Medication (see eMar), Heat applied, Cold applied, Repositioned, Rest, Elevation   Procedure:    After starting an IV, the patient was sedated with 2 mg of Midazolam and 0 mcg of Fentanyl intravenously by the RN under my direct supervision. The patient's vital signs including BP, EKG and SaO2 were monitored by the RN and they remained stable during the procedure. A meaningful communication was kept up with the patient throughout the procedure. The patient is placed in prone position. Skin over the back was prepped and draped in sterile manner. Then using fluoroscopy the Right sacroiliac joint was identified. Then the angle of the fluoroscopy was adjusted such that the view of the caudal aspect of the joint space was optimized. Then skin and deep tissues over the caudal aspect of the joint were infiltrated with 3 ml of 0.5% Naropin. The #22-gauge, 3-1/2 inch spinal needle was introduced through the skin wheal and directed such that the tip of the needle lies in the joint space.    This was confirmed by

## 2018-08-13 NOTE — PROGRESS NOTES
Discharge criteria met. Post procedure dressing dry and intact. Sensory and motor function intact as per pre-procedure. Patient alert and oriented x3  Instructions and follow up reviewed with pt at patient at discharge. Discharged home transported by wheelchair, accompanied by family .   Discharged @2932

## 2018-08-14 ENCOUNTER — TELEPHONE (OUTPATIENT)
Dept: PAIN MANAGEMENT | Age: 68
End: 2018-08-14

## 2018-08-15 ENCOUNTER — TELEPHONE (OUTPATIENT)
Dept: PAIN MANAGEMENT | Age: 68
End: 2018-08-15

## 2018-08-15 NOTE — TELEPHONE ENCOUNTER
Pt states felt well first day after procedure,but yesterday had to take one pain pill, which offered relirf. Pt will continue to \"take it easy\".   Will also keep follow up appt

## 2018-08-27 ENCOUNTER — HOSPITAL ENCOUNTER (OUTPATIENT)
Dept: PAIN MANAGEMENT | Age: 68
Discharge: HOME | End: 2018-08-27
Payer: MEDICARE

## 2018-08-27 VITALS
HEART RATE: 68 BPM | BODY MASS INDEX: 31.49 KG/M2 | HEIGHT: 65 IN | SYSTOLIC BLOOD PRESSURE: 141 MMHG | DIASTOLIC BLOOD PRESSURE: 78 MMHG | OXYGEN SATURATION: 98 % | TEMPERATURE: 98.6 F | RESPIRATION RATE: 18 BRPM | WEIGHT: 189 LBS

## 2018-08-27 DIAGNOSIS — M96.1 FAILED BACK SURGICAL SYNDROME: Primary | ICD-10-CM

## 2018-08-27 DIAGNOSIS — M79.18 MYOFASCIAL PAIN: ICD-10-CM

## 2018-08-27 DIAGNOSIS — Z92.29 HX OF LONG TERM USE OF BLOOD THINNERS: ICD-10-CM

## 2018-08-27 DIAGNOSIS — Z79.01 LONG TERM CURRENT USE OF ANTICOAGULANT THERAPY: ICD-10-CM

## 2018-08-27 DIAGNOSIS — M53.3 SACROILIAC JOINT DISEASE: ICD-10-CM

## 2018-08-27 DIAGNOSIS — M96.1 CERVICAL POST-LAMINECTOMY SYNDROME: ICD-10-CM

## 2018-08-27 PROCEDURE — 99213 OFFICE O/P EST LOW 20 MIN: CPT | Performed by: ANESTHESIOLOGY

## 2018-08-27 PROCEDURE — 99213 OFFICE O/P EST LOW 20 MIN: CPT

## 2018-08-27 RX ORDER — GABAPENTIN 100 MG/1
200 CAPSULE ORAL EVERY EVENING
Qty: 60 CAPSULE | Refills: 1 | Status: SHIPPED | OUTPATIENT
Start: 2018-08-27 | End: 2018-10-22

## 2018-08-27 ASSESSMENT — ENCOUNTER SYMPTOMS
HEARTBURN: 1
DOUBLE VISION: 0
ABDOMINAL PAIN: 0
DIARRHEA: 0
SINUS PAIN: 1
COUGH: 0
NAUSEA: 0
SPUTUM PRODUCTION: 0
BLOOD IN STOOL: 0
EYE PAIN: 0
PHOTOPHOBIA: 0
SHORTNESS OF BREATH: 1
HEMOPTYSIS: 0
EYE REDNESS: 0
EYE DISCHARGE: 0
CONSTIPATION: 0
VOMITING: 0
BACK PAIN: 1
SORE THROAT: 0
BLURRED VISION: 0
ORTHOPNEA: 0
WHEEZING: 0

## 2018-08-27 ASSESSMENT — PAIN DESCRIPTION - LOCATION: LOCATION: BACK;HIP

## 2018-08-27 ASSESSMENT — PAIN DESCRIPTION - PROGRESSION: CLINICAL_PROGRESSION: GRADUALLY IMPROVING

## 2018-08-27 ASSESSMENT — PAIN DESCRIPTION - FREQUENCY: FREQUENCY: CONTINUOUS

## 2018-08-27 ASSESSMENT — PAIN DESCRIPTION - PAIN TYPE: TYPE: CHRONIC PAIN

## 2018-08-27 ASSESSMENT — PAIN DESCRIPTION - DESCRIPTORS: DESCRIPTORS: DULL

## 2018-08-27 ASSESSMENT — PAIN DESCRIPTION - ONSET: ONSET: ON-GOING

## 2018-08-27 ASSESSMENT — PAIN SCALES - GENERAL: PAINLEVEL_OUTOF10: 4

## 2018-08-27 ASSESSMENT — PAIN DESCRIPTION - ORIENTATION: ORIENTATION: RIGHT;LOWER

## 2018-09-18 ENCOUNTER — HOSPITAL ENCOUNTER (OUTPATIENT)
Dept: PAIN MANAGEMENT | Age: 68
Discharge: HOME | End: 2018-09-18
Payer: MEDICARE

## 2018-09-18 ENCOUNTER — HOSPITAL ENCOUNTER (OUTPATIENT)
Dept: GENERAL RADIOLOGY | Age: 68
Discharge: HOME | End: 2018-09-20
Payer: MEDICARE

## 2018-09-18 VITALS
OXYGEN SATURATION: 97 % | SYSTOLIC BLOOD PRESSURE: 119 MMHG | HEIGHT: 65 IN | TEMPERATURE: 97.9 F | BODY MASS INDEX: 31.49 KG/M2 | WEIGHT: 189 LBS | HEART RATE: 72 BPM | DIASTOLIC BLOOD PRESSURE: 74 MMHG | RESPIRATION RATE: 16 BRPM

## 2018-09-18 DIAGNOSIS — M51.36 DDD (DEGENERATIVE DISC DISEASE), LUMBAR: ICD-10-CM

## 2018-09-18 DIAGNOSIS — M54.16 LUMBAR RADICULAR PAIN: ICD-10-CM

## 2018-09-18 DIAGNOSIS — M96.1 FAILED BACK SURGICAL SYNDROME: ICD-10-CM

## 2018-09-18 DIAGNOSIS — M54.16 LUMBAR RADICULAR PAIN: Primary | ICD-10-CM

## 2018-09-18 LAB
INR BLD: 1
PROTHROMBIN TIME: 10.6 SEC (ref 9.7–12)

## 2018-09-18 PROCEDURE — 62323 NJX INTERLAMINAR LMBR/SAC: CPT | Performed by: PAIN MEDICINE

## 2018-09-18 PROCEDURE — 85610 PROTHROMBIN TIME: CPT

## 2018-09-18 PROCEDURE — 62327 NJX INTERLAMINAR LMBR/SAC: CPT

## 2018-09-18 PROCEDURE — 6360000002 HC RX W HCPCS: Performed by: PAIN MEDICINE

## 2018-09-18 PROCEDURE — 6360000002 HC RX W HCPCS

## 2018-09-18 PROCEDURE — 3209999900 FLUORO FOR SURGICAL PROCEDURES

## 2018-09-18 PROCEDURE — 6360000004 HC RX CONTRAST MEDICATION

## 2018-09-18 PROCEDURE — 36415 COLL VENOUS BLD VENIPUNCTURE: CPT

## 2018-09-18 RX ORDER — MIDAZOLAM HYDROCHLORIDE 1 MG/ML
INJECTION INTRAMUSCULAR; INTRAVENOUS
Status: COMPLETED | OUTPATIENT
Start: 2018-09-18 | End: 2018-09-18

## 2018-09-18 RX ADMIN — MIDAZOLAM 2 MG: 1 INJECTION INTRAMUSCULAR; INTRAVENOUS at 11:51

## 2018-09-18 ASSESSMENT — PAIN DESCRIPTION - PROGRESSION: CLINICAL_PROGRESSION: GRADUALLY WORSENING

## 2018-09-18 ASSESSMENT — PAIN DESCRIPTION - FREQUENCY: FREQUENCY: CONTINUOUS

## 2018-09-18 ASSESSMENT — PAIN DESCRIPTION - PAIN TYPE: TYPE: CHRONIC PAIN

## 2018-09-18 ASSESSMENT — PAIN DESCRIPTION - DIRECTION: RADIATING_TOWARDS: LEGS BILATERAL

## 2018-09-18 ASSESSMENT — PAIN DESCRIPTION - ORIENTATION: ORIENTATION: LOWER;RIGHT;LEFT

## 2018-09-18 ASSESSMENT — PAIN SCALES - GENERAL: PAINLEVEL_OUTOF10: 4

## 2018-09-18 ASSESSMENT — PAIN - FUNCTIONAL ASSESSMENT
PAIN_FUNCTIONAL_ASSESSMENT: 0-10
PAIN_FUNCTIONAL_ASSESSMENT: 0-10

## 2018-09-18 ASSESSMENT — PAIN DESCRIPTION - ONSET: ONSET: ON-GOING

## 2018-09-18 ASSESSMENT — PAIN DESCRIPTION - LOCATION: LOCATION: BACK

## 2018-09-18 ASSESSMENT — ACTIVITIES OF DAILY LIVING (ADL): EFFECT OF PAIN ON DAILY ACTIVITIES: PAIN INCREASES WITH WALKING

## 2018-09-18 NOTE — PROCEDURES
that includes Sigmoidoscopy (6/20/2012); Spine surgery (2005); Tonsillectomy (2002); Varicose vein surgery (Bilateral); cervical fusion (2013); sinus surgery; Upper gastrointestinal endoscopy (8/14/2013); Hysterectomy (1986); Colonoscopy (7/16/2012); back surgery; and back surgery (10/17/2016). Pre-Sedation Documentation and Exam:   Vital signs have been reviewed (see flow sheet for vitals). Mallampati Airway Assessment:  normal    ASA Classification:  Class 3 - A patient with severe systemic disease that limits activity but is not incapacitating    Sedation/ Anesthesia Plan:   intravenous sedation  as needed. Medications Planned:     midazolam (Versed) / Fentanyl  Intravenously  as needed. Patient is an appropriate candidate for plan of sedation: yes  Patient's History and Physical examination was reviewed and there is no change. Electronically signed by Trung Griffin MD on 9/18/2018 at 12:29 PM      Preoperative Diagnosis:    1. Lumbar radicular pain    2. DDD (degenerative disc disease), lumbar    3. Failed back surgical syndrome        Postoperative Diagnosis:   1. Lumbar radicular pain    2. DDD (degenerative disc disease), lumbar    3. Failed back surgical syndrome        Procedure Performed: Caudal epidural steroid injection under fluoroscopy guidance  with IV sedation    Indication for the Procedure:  Patient failed conservative management for pain in low back radiating to lower extremities. patient had a lumbar fusion surgery in the past    As the patient is not responding to conservative management and interfering with activities of daily living we decided to proceed with caudal  epidural steroid injection.   The procedure and risks were discussed with the patient and an informed consent was obtained    Current Pain Assessment  Pain Assessment  Pain Assessment: 0-10  Pain Level: 5  Pain Type: Chronic pain  Pain Location: Back  Pain Orientation: Lower, Right, Left  Pain Radiating

## 2018-09-18 NOTE — PROGRESS NOTES
Discharge criteria met. Patient alert and oriented x3  Post procedure dressing dry and intact. Sensory and motor function intact as per pre-procedure. Instructions and follow up reviewed with pt at patient at discharge. Patient discharged ambulatory @ 12:39pm      Patient discharged per wheelchair accompanied by .

## 2018-09-19 ENCOUNTER — TELEPHONE (OUTPATIENT)
Dept: PAIN MANAGEMENT | Age: 68
End: 2018-09-19

## 2018-09-21 ENCOUNTER — TELEPHONE (OUTPATIENT)
Dept: PAIN MANAGEMENT | Age: 68
End: 2018-09-21

## 2018-10-02 ENCOUNTER — HOSPITAL ENCOUNTER (OUTPATIENT)
Dept: PAIN MANAGEMENT | Age: 68
Discharge: HOME | End: 2018-10-02
Payer: MEDICARE

## 2018-10-22 ENCOUNTER — HOSPITAL ENCOUNTER (OUTPATIENT)
Dept: PAIN MANAGEMENT | Age: 68
Discharge: HOME | End: 2018-10-22
Payer: MEDICARE

## 2018-10-22 VITALS
HEART RATE: 76 BPM | OXYGEN SATURATION: 95 % | TEMPERATURE: 97.5 F | WEIGHT: 189 LBS | BODY MASS INDEX: 31.49 KG/M2 | SYSTOLIC BLOOD PRESSURE: 132 MMHG | RESPIRATION RATE: 16 BRPM | HEIGHT: 65 IN | DIASTOLIC BLOOD PRESSURE: 79 MMHG

## 2018-10-22 DIAGNOSIS — M54.16 LUMBAR RADICULAR PAIN: Primary | ICD-10-CM

## 2018-10-22 DIAGNOSIS — M53.3 SACROILIAC JOINT DISEASE: ICD-10-CM

## 2018-10-22 DIAGNOSIS — M96.1 FAILED BACK SURGICAL SYNDROME: ICD-10-CM

## 2018-10-22 DIAGNOSIS — M51.36 DDD (DEGENERATIVE DISC DISEASE), LUMBAR: ICD-10-CM

## 2018-10-22 PROCEDURE — 99214 OFFICE O/P EST MOD 30 MIN: CPT | Performed by: PAIN MEDICINE

## 2018-10-22 PROCEDURE — 99213 OFFICE O/P EST LOW 20 MIN: CPT

## 2018-10-22 RX ORDER — TIZANIDINE 4 MG/1
4 TABLET ORAL EVERY 8 HOURS PRN
Qty: 90 TABLET | Refills: 0 | Status: SHIPPED | OUTPATIENT
Start: 2018-10-22 | End: 2019-03-26 | Stop reason: SDUPTHER

## 2018-10-22 ASSESSMENT — PAIN DESCRIPTION - PROGRESSION: CLINICAL_PROGRESSION: GRADUALLY WORSENING

## 2018-10-22 ASSESSMENT — ENCOUNTER SYMPTOMS
EYES NEGATIVE: 1
ALLERGIC/IMMUNOLOGIC NEGATIVE: 1
BACK PAIN: 1
RESPIRATORY NEGATIVE: 1
GASTROINTESTINAL NEGATIVE: 1

## 2018-10-22 ASSESSMENT — PAIN SCALES - GENERAL: PAINLEVEL_OUTOF10: 2

## 2018-10-22 ASSESSMENT — PAIN DESCRIPTION - DIRECTION: RADIATING_TOWARDS: BILAT LEGS

## 2018-10-22 ASSESSMENT — PAIN DESCRIPTION - PAIN TYPE: TYPE: CHRONIC PAIN

## 2018-10-22 ASSESSMENT — PAIN DESCRIPTION - ORIENTATION: ORIENTATION: LOWER;RIGHT;LEFT

## 2018-10-22 ASSESSMENT — PAIN DESCRIPTION - LOCATION: LOCATION: BACK

## 2018-10-22 ASSESSMENT — PAIN DESCRIPTION - ONSET: ONSET: ON-GOING

## 2018-10-22 NOTE — PROGRESS NOTES
Chronic back pain     Depression     YEARS AGO NO RECENT PROBLEMS    Difficult intravenous access     AT TIMES    Diverticulitis     [facility] ([facility]     Right upper arm    Dyslexia     GERD (gastroesophageal reflux disease) 2014    ON RX    Headache(784.0)     Hx of blood clots 2001    LT UPPER LEG TREATED WITH INJECTIONS THEN ORAL BLOOD THINNER FOR 1 YR    Hyperlipidemia 2014    ON RX    Hypertension 2014    ON RX    Migraine     Mitral valve prolapse 1970    NO SYMPTOMS    Sleep apnea 2006    ON MACHINE HAD SURGERY-NOW RESOLVED    Wears dentures     UPPER    Wears glasses        Surgical History  Past Surgical History:   Procedure Laterality Date    BACK SURGERY      LUMBAR UNSURE WHEN    BACK SURGERY  10/17/2016    hardware removal, revision posterior instrumentation    CERVICAL FUSION  2013    Anterior    COLONOSCOPY  7/16/2012    normal    HYSTERECTOMY  1986    TOTAL    SIGMOIDOSCOPY  6/20/2012    normal    SINUS SURGERY      SPINE SURGERY  2005    LUMBAR    TONSILLECTOMY  2002    UPPP, TURBINOPLASY    UPPER GASTROINTESTINAL ENDOSCOPY  8/14/2013    Bravo done, see note, hiatal hernia, Grade 1 erosive esophagitis    VARICOSE VEIN SURGERY Bilateral        Medications  Current Outpatient Prescriptions   Medication Sig Dispense Refill    calcium carbonate (OSCAL) 500 MG TABS tablet Take 500 mg by mouth daily      HYDROcodone-acetaminophen (NORCO) 5-325 MG per tablet ONE  TID  PRN.  Earliest Fill Date: 7/18/17 90 tablet 0    cyanocobalamin 1000 MCG/ML injection Inject 1 mL into the muscle every 30 days 1 mL 5    LORazepam (ATIVAN) 1 MG tablet Take 1 tablet by mouth 2 times daily as needed for Anxiety 60 tablet 0    warfarin (COUMADIN) 2.5 MG tablet 1 pill po daily 90 tablet 3    nitroGLYCERIN (NITROSTAT) 0.4 MG SL tablet       metoprolol tartrate (LOPRESSOR) 25 MG tablet TAKE 1 TABLET TWICE DAILY 180 tablet 3    atorvastatin (LIPITOR) 20 MG tablet Take 1 tablet by

## 2018-10-23 ASSESSMENT — ENCOUNTER SYMPTOMS
EYE DISCHARGE: 0
COLOR CHANGE: 0
NAUSEA: 0
ABDOMINAL DISTENTION: 0
BOWEL INCONTINENCE: 0
COUGH: 0
PHOTOPHOBIA: 0
VOMITING: 0
CONSTIPATION: 0
SHORTNESS OF BREATH: 0

## 2019-03-26 ENCOUNTER — HOSPITAL ENCOUNTER (OUTPATIENT)
Dept: PAIN MANAGEMENT | Age: 69
Discharge: HOME | End: 2019-03-26
Payer: MEDICARE

## 2019-03-26 VITALS
TEMPERATURE: 97.8 F | WEIGHT: 189 LBS | SYSTOLIC BLOOD PRESSURE: 156 MMHG | DIASTOLIC BLOOD PRESSURE: 90 MMHG | BODY MASS INDEX: 31.49 KG/M2 | HEART RATE: 75 BPM | RESPIRATION RATE: 16 BRPM | OXYGEN SATURATION: 96 % | HEIGHT: 65 IN

## 2019-03-26 DIAGNOSIS — M96.1 CERVICAL POST-LAMINECTOMY SYNDROME: ICD-10-CM

## 2019-03-26 DIAGNOSIS — M54.16 LUMBAR RADICULAR PAIN: ICD-10-CM

## 2019-03-26 DIAGNOSIS — M53.3 SACROILIAC JOINT DISEASE: ICD-10-CM

## 2019-03-26 DIAGNOSIS — M47.816 LUMBAR FACET ARTHROPATHY: ICD-10-CM

## 2019-03-26 DIAGNOSIS — G89.29 CHRONIC LOW BACK PAIN, UNSPECIFIED BACK PAIN LATERALITY, WITH SCIATICA PRESENCE UNSPECIFIED: ICD-10-CM

## 2019-03-26 DIAGNOSIS — M50.30 DDD (DEGENERATIVE DISC DISEASE), CERVICAL: ICD-10-CM

## 2019-03-26 DIAGNOSIS — M51.36 DDD (DEGENERATIVE DISC DISEASE), LUMBAR: ICD-10-CM

## 2019-03-26 DIAGNOSIS — M79.18 MYOFASCIAL PAIN: ICD-10-CM

## 2019-03-26 DIAGNOSIS — M96.1 FAILED BACK SURGICAL SYNDROME: ICD-10-CM

## 2019-03-26 DIAGNOSIS — M46.1 SACROILIITIS (HCC): Primary | ICD-10-CM

## 2019-03-26 DIAGNOSIS — M54.5 CHRONIC LOW BACK PAIN, UNSPECIFIED BACK PAIN LATERALITY, WITH SCIATICA PRESENCE UNSPECIFIED: ICD-10-CM

## 2019-03-26 DIAGNOSIS — M47.26 OSTEOARTHRITIS OF SPINE WITH RADICULOPATHY, LUMBAR REGION: ICD-10-CM

## 2019-03-26 PROCEDURE — 80307 DRUG TEST PRSMV CHEM ANLYZR: CPT

## 2019-03-26 PROCEDURE — 99213 OFFICE O/P EST LOW 20 MIN: CPT

## 2019-03-26 PROCEDURE — 99213 OFFICE O/P EST LOW 20 MIN: CPT | Performed by: NURSE PRACTITIONER

## 2019-03-26 RX ORDER — TIZANIDINE 4 MG/1
4 TABLET ORAL EVERY 8 HOURS PRN
Qty: 90 TABLET | Refills: 0 | Status: SHIPPED | OUTPATIENT
Start: 2019-03-26 | End: 2019-04-23 | Stop reason: SDUPTHER

## 2019-03-26 RX ORDER — OXYCODONE HYDROCHLORIDE AND ACETAMINOPHEN 5; 325 MG/1; MG/1
1 TABLET ORAL EVERY 8 HOURS PRN
Qty: 90 TABLET | Refills: 0 | Status: SHIPPED | OUTPATIENT
Start: 2019-03-26 | End: 2019-04-23 | Stop reason: SDUPTHER

## 2019-03-26 ASSESSMENT — ENCOUNTER SYMPTOMS
BACK PAIN: 1
SHORTNESS OF BREATH: 1

## 2019-03-29 LAB
6-ACETYLMORPHINE, UR: NOT DETECTED
7-AMINOCLONAZEPAM, URINE: NOT DETECTED
ALPHA-OH-ALPRAZ, URINE: NOT DETECTED
ALPRAZOLAM, URINE: NOT DETECTED
AMPHETAMINES, URINE: NOT DETECTED
BARBITURATES, URINE: NOT DETECTED
BENZOYLECGONINE, UR: NOT DETECTED
BUPRENORPHINE URINE: NOT DETECTED
CARISOPRODOL, UR: NOT DETECTED
CLONAZEPAM, URINE: NOT DETECTED
CODEINE, URINE: NOT DETECTED
CREATININE URINE: 77.5 MG/DL (ref 20–400)
DIAZEPAM, URINE: NOT DETECTED
DRUGS EXPECTED, UR: NORMAL
EER HI RES INTERP UR: NORMAL
ETHYL GLUCURONIDE UR: NOT DETECTED
FENTANYL URINE: NOT DETECTED
HYDROCODONE, URINE: PRESENT
HYDROMORPHONE, URINE: NOT DETECTED
LORAZEPAM, URINE: PRESENT
MARIJUANA METAB, UR: NOT DETECTED
MDA, UR: NOT DETECTED
MDEA, EVE, UR: NOT DETECTED
MDMA URINE: NOT DETECTED
MEPERIDINE METAB, UR: NOT DETECTED
METHADONE, URINE: NOT DETECTED
METHAMPHETAMINE, URINE: NOT DETECTED
METHYLPHENIDATE: NOT DETECTED
MIDAZOLAM, URINE: NOT DETECTED
MORPHINE URINE: NOT DETECTED
NORBUPRENORPHINE, URINE: NOT DETECTED
NORDIAZEPAM, URINE: NOT DETECTED
NORFENTANYL, URINE: NOT DETECTED
NORHYDROCODONE, URINE: PRESENT
NOROXYCODONE, URINE: NOT DETECTED
NOROXYMORPHONE, URINE: NOT DETECTED
OXAZEPAM, URINE: NOT DETECTED
OXYCODONE URINE: NOT DETECTED
OXYMORPHONE, URINE: NOT DETECTED
PAIN MANAGEMENT DRUG PANEL INTERP, URINE: NORMAL
PAIN MGT DRUG PANEL, HI RES, UR: NORMAL
PCP,URINE: NOT DETECTED
PHENTERMINE, UR: NOT DETECTED
PROPOXYPHENE, URINE: NOT DETECTED
TAPENTADOL, URINE: NOT DETECTED
TAPENTADOL-O-SULFATE, URINE: NOT DETECTED
TEMAZEPAM, URINE: NOT DETECTED
TRAMADOL, URINE: NOT DETECTED
ZOLPIDEM, URINE: NOT DETECTED

## 2019-04-23 ENCOUNTER — HOSPITAL ENCOUNTER (OUTPATIENT)
Dept: PAIN MANAGEMENT | Age: 69
Discharge: HOME | End: 2019-04-23
Payer: MEDICARE

## 2019-04-23 VITALS
BODY MASS INDEX: 31.49 KG/M2 | DIASTOLIC BLOOD PRESSURE: 88 MMHG | RESPIRATION RATE: 16 BRPM | HEIGHT: 65 IN | HEART RATE: 81 BPM | SYSTOLIC BLOOD PRESSURE: 142 MMHG | TEMPERATURE: 97.9 F | OXYGEN SATURATION: 94 % | WEIGHT: 189 LBS

## 2019-04-23 DIAGNOSIS — G89.29 CHRONIC LOW BACK PAIN, UNSPECIFIED BACK PAIN LATERALITY, WITH SCIATICA PRESENCE UNSPECIFIED: ICD-10-CM

## 2019-04-23 DIAGNOSIS — Z79.01 LONG TERM CURRENT USE OF ANTICOAGULANT THERAPY: ICD-10-CM

## 2019-04-23 DIAGNOSIS — M47.816 LUMBAR FACET ARTHROPATHY: ICD-10-CM

## 2019-04-23 DIAGNOSIS — M79.18 MYOFASCIAL PAIN: ICD-10-CM

## 2019-04-23 DIAGNOSIS — M54.5 CHRONIC LOW BACK PAIN, UNSPECIFIED BACK PAIN LATERALITY, WITH SCIATICA PRESENCE UNSPECIFIED: ICD-10-CM

## 2019-04-23 DIAGNOSIS — M51.36 DDD (DEGENERATIVE DISC DISEASE), LUMBAR: ICD-10-CM

## 2019-04-23 DIAGNOSIS — M96.1 FAILED BACK SYNDROME: ICD-10-CM

## 2019-04-23 DIAGNOSIS — M96.1 CERVICAL POST-LAMINECTOMY SYNDROME: ICD-10-CM

## 2019-04-23 DIAGNOSIS — M54.16 LUMBAR RADICULAR PAIN: ICD-10-CM

## 2019-04-23 DIAGNOSIS — M47.26 OSTEOARTHRITIS OF SPINE WITH RADICULOPATHY, LUMBAR REGION: ICD-10-CM

## 2019-04-23 DIAGNOSIS — M50.30 DDD (DEGENERATIVE DISC DISEASE), CERVICAL: ICD-10-CM

## 2019-04-23 DIAGNOSIS — M96.1 FAILED BACK SURGICAL SYNDROME: ICD-10-CM

## 2019-04-23 DIAGNOSIS — M46.1 SACROILIITIS (HCC): ICD-10-CM

## 2019-04-23 DIAGNOSIS — M53.3 SACROILIAC JOINT DISEASE: Primary | ICD-10-CM

## 2019-04-23 PROCEDURE — 99213 OFFICE O/P EST LOW 20 MIN: CPT | Performed by: NURSE PRACTITIONER

## 2019-04-23 PROCEDURE — 99213 OFFICE O/P EST LOW 20 MIN: CPT

## 2019-04-23 RX ORDER — OXYCODONE HYDROCHLORIDE AND ACETAMINOPHEN 5; 325 MG/1; MG/1
1 TABLET ORAL EVERY 8 HOURS PRN
Qty: 90 TABLET | Refills: 0 | Status: SHIPPED | OUTPATIENT
Start: 2019-04-27 | End: 2019-04-25 | Stop reason: SDUPTHER

## 2019-04-23 RX ORDER — TIZANIDINE 4 MG/1
4 TABLET ORAL EVERY 8 HOURS PRN
Qty: 90 TABLET | Refills: 0 | Status: SHIPPED | OUTPATIENT
Start: 2019-04-29 | End: 2019-05-24 | Stop reason: SDUPTHER

## 2019-04-23 ASSESSMENT — ENCOUNTER SYMPTOMS
BACK PAIN: 1
HEARTBURN: 1
RESPIRATORY NEGATIVE: 1

## 2019-04-23 NOTE — PROGRESS NOTES
abuse or diversion identified: otherwise, see note documentation CHRISTIAN Barr CNP)  Chronic Pain > 80 MEDD: Obtained or confirmed a written medication contract was on file. CHRISTIAN Barr CNP)  Review ofOARRS does not show any aberrant prescription behavior. Medication is helping the patient stay active. Patient denies any side effects and reports adequate analgesia. No sign of misuse/abuse. When was thelast UDS:   3-26-19          Was the UDS appropriate:yes      Record/Diagnostics Review:      As above, I did review the imaging  3/29/2019  9:48 PM - Reinaldo, Babitapn Incoming Lab Results From [facility]     Component Value Ref Range & Units Status Collected Lab   Pain Management Drug Panel Interp, Urine Inconsistent   Final 03/26/2019 12:00 PM [facility]   (NOTE)   ________________________________________________________________   DRUGS EXPECTED:   HYDROCODONE [3/25/19]   ________________________________________________________________   CONSISTENT with medications provided:   HYDROCODONE : based on hydrocodone, norhydrocodone   ________________________________________________________________   INCONSISTENT with medications provided:   Lorazepam   ________________________________________________________________   INTERPRETIVE INFORMATION: Pain Mgt [place], [place] Spec/EMIT, Ur,                            Interp   Interpretation depends on accuracy and completeness of patient   medication information submitted by client.     6-Acetylmorphine, Ur Not Detected   Final 03/26/2019 12:00 PM [facility]   7-Aminoclonazepam, Urine Not Detected   Final 03/26/2019 12:00 PM [facility]   Alpha-OH-Alpraz, Urine Not Detected   Final 03/26/2019 12:00 PM [facility]   Alprazolam, Urine Not Detected   Final 03/26/2019 12:00 PM [facility]   Amphetamines, urine Not Detected   Final 03/26/2019 12:00 PM [facility]   Barbiturates, Ur Not Detected   Final 03/26/2019 12:00 PM [facility]   Benzoylecgonine, Ur Not Detected   Final 03/26/2019 12:00 PM [facility]   Buprenorphine Urine Not Detected   Final 03/26/2019 12:00 PM [facility]   Carisoprodol, Ur Not Detected   Final 03/26/2019 12:00 PM [facility]   (NOTE)   The carisoprodol immunoassay has cross-reactivity to carisoprodol   and meprobamate.     Clonazepam, Urine Not Detected   Final 03/26/2019 12:00 PM [facility]   Codeine, Urine Not Detected   Final 03/26/2019 12:00 PM [facility]   MDA, Ur Not Detected   Final 03/26/2019 12:00 PM [facility]   Diazepam, Urine Not Detected   Final 03/26/2019 12:00 PM [facility]   Ethyl Glucuronide Ur Not Detected   Final 03/26/2019 12:00 PM [facility]   Fentanyl, Ur Not Detected   Final 03/26/2019 12:00 PM [facility]   Hydrocodone, Urine Present   Final 03/26/2019 12:00 PM [facility]   Hydromorphone, Urine Not Detected   Final 03/26/2019 12:00 PM [facility]   Lorazepam, Urine Present   Final 03/26/2019 12:00 PM [facility]   Marijuana Metab, Ur Not Detected   Final 03/26/2019 12:00 PM [facility]   MDEA, HUY, Ur Not Detected   Final 03/26/2019 12:00 PM [facility]   MDMA, Urine Not Detected   Final 03/26/2019 12:00 PM [facility]   Meperidine Metab, Ur Not Detected   Final 03/26/2019 12:00 PM [facility]   Methadone, Urine Not Detected   Final 03/26/2019 12:00 PM [facility]   Methamphetamine, Urine Not Detected   Final 03/26/2019 12:00 PM [facility]   Methylphenidate Not Detected   Final 03/26/2019 12:00 PM [facility]   Midazolam, Urine Not Detected   Final 03/26/2019 12:00 PM [facility]   Morphine Urine Not Detected   Final 03/26/2019 12:00 PM [facility]   Norbuprenorphine, Urine Not Detected   Final 03/26/2019 12:00 PM [facility]   Nordiazepam, Urine Not Detected   Final 03/26/2019 12:00 PM [facility]   Norfentanyl, Urine Not Detected   Final 03/26/2019 12:00 PM [facility]   NORHYDROCODONE, URINE Present   Final 03/26/2019 12:00 PM [facility]   Noroxycodone, Urine Not Detected   Final 03/26/2019 12:00 PM [facility]   NOROXYMORPHONE, URINE Not Detected   Final 03/26/2019 12:00 PM [facility]   Oxazepam, Urine Not Detected   Final 03/26/2019 12:00 PM [facility]   Oxycodone Urine Not Detected   Final 03/26/2019 12:00 PM [facility]   Oxymorphone, Urine Not Detected   Final 03/26/2019 12:00 PM [facility]   PCP, Urine Not Detected   Final 03/26/2019 12:00 PM [facility]   Phentermine, Ur Not Detected   Final 03/26/2019 12:00 PM [facility]   Propoxyphene, Urine Not Detected   Final 03/26/2019 12:00 PM [facility]   Tapentadol-O-Sulfate, Urine Not Detected   Final 03/26/2019 12:00 PM [facility]   Tapentadol, Urine Not Detected   Final 03/26/2019 12:00 PM [facility]   Temazepam, Urine Not Detected   Final 03/26/2019 12:00 PM [facility]   Tramadol, Urine Not Detected   Final 03/26/2019 12:00 PM [facility]   Zolpidem, Urine Not Detected   Final 03/26/2019 12:00 PM [facility]   Drugs Expected, Ur   Final 03/26/2019 12:00  [address] [facility]   HYDROCODONE ON 3/25/19 AT 9AM    Creatinine, Ur 77.5  20.0 - 400.0 mg/dL Final 03/26/2019 12:00 PM [facility]   Pain Mgt Drug Panel, Hi Res, Ur See Below   Final 03/26/2019 12:00 PM [facility]   (NOTE)   Methodology: Qualitative Enzyme Immunoassay and Qualitative Liquid   Chromatography-Time of Flight-Mass Spectrometry or Tandem Mass   Spectrometry, Quantitative Spectrophotometry   The absence of expected drug(s) and/or drug metabolite(s) may   indicate non-compliance, inappropriate timing of specimen   collection relative to drug administration, poor drug absorption,   diluted/adulterated urine, or limitations of testing. The   concentration must be greater than or equal to the cutoff to be   reported as present.  If specific drug concentrations are   required, contact the laboratory within two weeks of specimen   collection to request quantification by a second analytical   technique. Interpretive questions should be directed to the   laboratory. Results based on immunoassay detection that do not match clinical   expectations should be   interpreted with caution. Confirmatory testing by mass   spectrometry for immunoassay-based results is available, if   ordered within two weeks of specimen collection. Additional   charges apply. For medical purposes only; not valid for forensic use.    This test was [facility] done, see note, hiatal hernia, Grade 1 erosive esophagitis    VARICOSE VEIN SURGERY Bilateral        Allergies   Allergen Reactions    Xarelto [Rivaroxaban] Hives, Swelling and Dermatitis    Morphine      HEADACHE, DROPS BLOOD PRESSURE      Seasonal     Adhesive Tape Hives         Current Outpatient Medications:     [START ON 4/27/2019] oxyCODONE-acetaminophen (PERCOCET) 5-325 MG per tablet, Take 1 tablet by mouth every 8 hours as needed for Pain for up to 30 days. , Disp: 90 tablet, Rfl: 0    [START ON 4/29/2019] tiZANidine (ZANAFLEX) 4 MG tablet, Take 1 tablet by mouth every 8 hours as needed (muscle spasms), Disp: 90 tablet, Rfl: 0    Specialty Vitamins Products (VITAMINS FOR THE HAIR PO), Take by mouth, Disp: , Rfl:     calcium carbonate (OSCAL) 500 MG TABS tablet, Take 500 mg by mouth daily, Disp: , Rfl:     cyanocobalamin 1000 MCG/ML injection, Inject 1 mL into the muscle every 30 days, Disp: 1 mL, Rfl: 5    LORazepam (ATIVAN) 1 MG tablet, Take 1 tablet by mouth 2 times daily as needed for Anxiety, Disp: 60 tablet, Rfl: 0    warfarin (COUMADIN) 2.5 MG tablet, 1 pill po daily, Disp: 90 tablet, Rfl: 3    metoprolol tartrate (LOPRESSOR) 25 MG tablet, TAKE 1 TABLET TWICE DAILY, Disp: 180 tablet, Rfl: 3    atorvastatin (LIPITOR) 20 MG tablet, Take 1 tablet by mouth nightly, Disp: 90 tablet, Rfl: 5    omeprazole (PRILOSEC) 20 MG delayed release capsule, Take 1 capsule by mouth Daily, Disp: 90 capsule, Rfl: 5    aspirin 81 MG tablet, Take 81 mg by mouth daily LAST DOSE 01/20/2017, Disp: , Rfl:     nitroGLYCERIN (NITROSTAT) 0.4 MG SL tablet, , Disp: , Rfl:     Blood Pressure Monitoring (BLOOD PRESSURE CUFF) MISC, 1 each by Does not apply route 2 times daily, Disp: 1 each, Rfl: 0    albuterol (PROVENTIL) (2.5 MG/3ML) 0.083% nebulizer solution, INHALE THE CONTENTS OF 1 VIAL VIA NEBULIZER EVERY 6 HOURS AS NEEDED  FOR  WHEEZING (SUBSTITUTED FOR PROVENTIL), Disp: 360 mL, Rfl: 1    Family History Problem Relation Age of Onset    Heart Disease Mother     Cancer Father         prostate    Cancer Brother         leukemia    Cancer Maternal Grandmother         colon    Cancer Paternal Grandmother         colon    Cancer Brother         PROSTATE    Cancer Brother         LUNG    Asthma Brother        Social History     Socioeconomic History    Marital status:      Spouse name: Not on file    Number of children: Not on file    Years of education: Not on file    Highest education level: Not on file   Occupational History    Occupation: Retired   Social Needs    Financial resource strain: Not on file    Food insecurity:     Worry: Not on file     Inability: Not on file   Travel Desiya needs:     Medical: Not on file     Non-medical: Not on file   Tobacco Use    Smoking status: Never Smoker    Smokeless tobacco: Never Used   Substance and Sexual Activity    Alcohol use: No    Drug use: No    Sexual activity: Not on file   Lifestyle    Physical activity:     Days per week: Not on file     Minutes per session: Not on file    Stress: Not on file   Relationships    Social connections:     Talks on phone: Not on file     Gets together: Not on file     Attends [employer] service: Not on file     Active member of club or organization: Not on file     Attends meetings of clubs or organizations: Not on file     Relationship status: Not on file    Intimate partner violence:     Fear of current or ex partner: Not on file     Emotionally abused: Not on file     Physically abused: Not on file     Forced sexual activity: Not on file   Other Topics Concern    Not on file   Social History Narrative    Not on file       Review of Systems:  Review of Systems   Constitution: Negative. HENT: Negative. Eyes:        Glasses   Cardiovascular: Positive for dyspnea on exertion. Respiratory: Negative. Endocrine: Negative. Hematologic/Lymphatic: Bruises/bleeds easily. Skin: Negative. Musculoskeletal: Positive for back pain and joint pain. Gastrointestinal: Positive for heartburn. Genitourinary: Positive for nocturia and urgency. Neurological: Positive for loss of balance and weakness. Cane    Psychiatric/Behavioral: The patient is nervous/anxious. Physical Exam:  BP (!) 142/88   Pulse 81   Temp 97.9 °F (36.6 °C) (Oral)   Resp 16   Ht 5' 5\" (1.651 m)   Wt 189 lb (85.7 kg)   LMP 10/05/1986 (Approximate)   SpO2 94%   BMI 31.45 kg/m²     Physical Exam   Constitutional: She appears well-developed. obese   HENT:   Head: Normocephalic. Neck: Normal range of motion. Neck supple. Pulmonary/Chest: Effort normal.   Musculoskeletal:        Lumbar back: She exhibits decreased range of motion and tenderness. Lumbar scar   Neurological: Gait abnormal.   Reflex Scores:       Patellar reflexes are 1+ on the right side and 1+ on the left side. Achilles reflexes are 1+ on the right side and 1+ on the left side. Ambulates with a cane   Skin: Skin is warm, dry and intact. Psychiatric: Her speech is normal and behavior is normal. Judgment and thought content normal. Her mood appears anxious.  Cognition and memory are normal.         Assessment:        Problem List Items Addressed This Visit     Sacroiliac joint disease - Primary    Relevant Medications    oxyCODONE-acetaminophen (PERCOCET) 5-325 MG per tablet (Start on 4/27/2019)    Osteoarthritis of spine with radiculopathy, lumbar region (Chronic)    Relevant Medications    oxyCODONE-acetaminophen (PERCOCET) 5-325 MG per tablet (Start on 4/27/2019)    tiZANidine (ZANAFLEX) 4 MG tablet (Start on 4/29/2019)    Myofascial pain    Relevant Medications    oxyCODONE-acetaminophen (PERCOCET) 5-325 MG per tablet (Start on 4/27/2019)    Lumbar radicular pain    Relevant Medications    oxyCODONE-acetaminophen (PERCOCET) 5-325 MG per tablet (Start on 4/27/2019)    tiZANidine (ZANAFLEX) 4 MG tablet (Start on 4/29/2019) Lumbar facet arthropathy (Chronic)    Relevant Medications    oxyCODONE-acetaminophen (PERCOCET) 5-325 MG per tablet (Start on 4/27/2019)    Long term current use of anticoagulant therapy    Failed back syndrome    Relevant Medications    oxyCODONE-acetaminophen (PERCOCET) 5-325 MG per tablet (Start on 4/27/2019)    tiZANidine (ZANAFLEX) 4 MG tablet (Start on 4/29/2019)    DDD (degenerative disc disease), lumbar    Relevant Medications    oxyCODONE-acetaminophen (PERCOCET) 5-325 MG per tablet (Start on 4/27/2019)    tiZANidine (ZANAFLEX) 4 MG tablet (Start on 4/29/2019)    DDD (degenerative disc disease), cervical    Relevant Medications    oxyCODONE-acetaminophen (PERCOCET) 5-325 MG per tablet (Start on 4/27/2019)    tiZANidine (ZANAFLEX) 4 MG tablet (Start on 4/29/2019)    Chronic back pain    Relevant Medications    oxyCODONE-acetaminophen (PERCOCET) 5-325 MG per tablet (Start on 4/27/2019)    tiZANidine (ZANAFLEX) 4 MG tablet (Start on 4/29/2019)    Cervical post-laminectomy syndrome    Relevant Medications    oxyCODONE-acetaminophen (PERCOCET) 5-325 MG per tablet (Start on 4/27/2019)      Other Visit Diagnoses     Sacroiliitis ([address]        Relevant Medications    oxyCODONE-acetaminophen (PERCOCET) 5-325 MG per tablet (Start on 4/27/2019)    tiZANidine (ZANAFLEX) 4 MG tablet (Start on 4/29/2019)    Failed back surgical syndrome        Relevant Medications    oxyCODONE-acetaminophen (PERCOCET) 5-325 MG per tablet (Start on 4/27/2019)    tiZANidine (ZANAFLEX) 4 MG tablet (Start on 4/29/2019)          Treatment Plan:  DISCUSSION: Treatment options discussed withpatient and all questions answered to patient's satisfaction.      Possible side effects, risk of tolerance and or dependence and alternative treatments discussed    Obtaining appropriate analgesic effect of treatment   No signs of potential drug abuse or diversion identified    [x] Ill effects of being on chronic pain medications such as sleep disturbances, respiratory depression, hormonal changes, withdrawal symptoms, chronic opioid dependence and tolerance as well as risk of taking opioids with Benzodiazepines and taking opioids along with alcohol,  werediscussed with patient. I had asked the patient to minimize medication use and utilize pain medications only for uncontrolled rest pain or pain with exertional activities. I advised patient not to self-escalate painmedications without consulting with us. At each of patient's future visits we will try to taper pain medications, while adjusting the adjunct medications, and re-evaluating for Physical Therapy to improve spinal andjoint strength. We will continue to have discussions to decrease pain medications as tolerated. Counseled patient on effects their pain medication and /or their medical condition mayhave on their  ability to drive or operate machinery. Instructed not to drive or operate machinery if drowsy     I also discussed with the patient regarding the dangers of combining narcotic pain medication with tranquilizers, alcohol or illegal drugs or taking the medication any way other than prescribed. The dangers were discussed  including respiratory depression and death. Patient was told to tell  all  physicians regarding the medications he is getting from pain clinic. Patient is warned not to take any unprescribed medications over-the-countermedications that can depress breathing . Patient is advised to talk to the pharmacist or physicians if planning to take any over-the-counter medications before  takeing them. Patient is strongly advised to avoid tranquilizers or  relaxants, illegal drugs  or any medications that can depress breathing  Patient is also advised to tell us if there is any changes in their medications from other physicians.         TREATMENT OPTIONS:   Medication Agreement updated  Return in 4 weeks  Medication Agreement Requirements Met  Continue Opioid therapy  Script written for percocet, tizaniidne  Follow up appointment made

## 2019-04-25 DIAGNOSIS — M50.30 DDD (DEGENERATIVE DISC DISEASE), CERVICAL: ICD-10-CM

## 2019-04-25 DIAGNOSIS — M51.36 DDD (DEGENERATIVE DISC DISEASE), LUMBAR: ICD-10-CM

## 2019-04-25 DIAGNOSIS — M54.5 CHRONIC LOW BACK PAIN, UNSPECIFIED BACK PAIN LATERALITY, WITH SCIATICA PRESENCE UNSPECIFIED: ICD-10-CM

## 2019-04-25 DIAGNOSIS — M47.816 LUMBAR FACET ARTHROPATHY: ICD-10-CM

## 2019-04-25 DIAGNOSIS — M47.26 OSTEOARTHRITIS OF SPINE WITH RADICULOPATHY, LUMBAR REGION: ICD-10-CM

## 2019-04-25 DIAGNOSIS — M96.1 CERVICAL POST-LAMINECTOMY SYNDROME: ICD-10-CM

## 2019-04-25 DIAGNOSIS — M46.1 SACROILIITIS (HCC): ICD-10-CM

## 2019-04-25 DIAGNOSIS — G89.29 CHRONIC LOW BACK PAIN, UNSPECIFIED BACK PAIN LATERALITY, WITH SCIATICA PRESENCE UNSPECIFIED: ICD-10-CM

## 2019-04-25 DIAGNOSIS — M54.16 LUMBAR RADICULAR PAIN: ICD-10-CM

## 2019-04-25 DIAGNOSIS — M79.18 MYOFASCIAL PAIN: ICD-10-CM

## 2019-04-25 DIAGNOSIS — M53.3 SACROILIAC JOINT DISEASE: ICD-10-CM

## 2019-04-25 RX ORDER — OXYCODONE HYDROCHLORIDE AND ACETAMINOPHEN 5; 325 MG/1; MG/1
1 TABLET ORAL EVERY 8 HOURS PRN
Qty: 90 TABLET | Refills: 0 | Status: SHIPPED | OUTPATIENT
Start: 2019-04-27 | End: 2019-05-24 | Stop reason: SDUPTHER

## 2019-05-24 ENCOUNTER — HOSPITAL ENCOUNTER (OUTPATIENT)
Dept: PAIN MANAGEMENT | Age: 69
Discharge: HOME | End: 2019-05-24
Payer: MEDICARE

## 2019-05-24 VITALS
DIASTOLIC BLOOD PRESSURE: 70 MMHG | WEIGHT: 189 LBS | SYSTOLIC BLOOD PRESSURE: 125 MMHG | BODY MASS INDEX: 31.49 KG/M2 | RESPIRATION RATE: 16 BRPM | TEMPERATURE: 97.7 F | HEART RATE: 83 BPM | HEIGHT: 65 IN | OXYGEN SATURATION: 97 %

## 2019-05-24 DIAGNOSIS — M53.3 SACROILIAC JOINT DISEASE: ICD-10-CM

## 2019-05-24 DIAGNOSIS — M54.59 LUMBAR FACET JOINT PAIN: ICD-10-CM

## 2019-05-24 DIAGNOSIS — M54.5 CHRONIC LOW BACK PAIN, UNSPECIFIED BACK PAIN LATERALITY, WITH SCIATICA PRESENCE UNSPECIFIED: ICD-10-CM

## 2019-05-24 DIAGNOSIS — M96.1 CERVICAL POST-LAMINECTOMY SYNDROME: ICD-10-CM

## 2019-05-24 DIAGNOSIS — G89.29 CHRONIC LOW BACK PAIN, UNSPECIFIED BACK PAIN LATERALITY, WITH SCIATICA PRESENCE UNSPECIFIED: ICD-10-CM

## 2019-05-24 DIAGNOSIS — M50.30 DDD (DEGENERATIVE DISC DISEASE), CERVICAL: ICD-10-CM

## 2019-05-24 DIAGNOSIS — M47.816 LUMBAR FACET ARTHROPATHY: ICD-10-CM

## 2019-05-24 DIAGNOSIS — M54.16 LUMBAR RADICULAR PAIN: ICD-10-CM

## 2019-05-24 DIAGNOSIS — M46.1 SACROILIITIS (HCC): ICD-10-CM

## 2019-05-24 DIAGNOSIS — M47.26 OSTEOARTHRITIS OF SPINE WITH RADICULOPATHY, LUMBAR REGION: ICD-10-CM

## 2019-05-24 DIAGNOSIS — M79.18 MYOFASCIAL PAIN: Primary | ICD-10-CM

## 2019-05-24 DIAGNOSIS — M51.36 DDD (DEGENERATIVE DISC DISEASE), LUMBAR: ICD-10-CM

## 2019-05-24 DIAGNOSIS — M96.1 FAILED BACK SYNDROME: ICD-10-CM

## 2019-05-24 PROCEDURE — 99213 OFFICE O/P EST LOW 20 MIN: CPT | Performed by: NURSE PRACTITIONER

## 2019-05-24 PROCEDURE — 99213 OFFICE O/P EST LOW 20 MIN: CPT

## 2019-05-24 RX ORDER — LANOLIN ALCOHOL/MO/W.PET/CERES
1000 CREAM (GRAM) TOPICAL DAILY
COMMUNITY
End: 2019-09-05 | Stop reason: ALTCHOICE

## 2019-05-24 RX ORDER — TIZANIDINE 4 MG/1
4 TABLET ORAL EVERY 8 HOURS PRN
Qty: 90 TABLET | Refills: 0 | Status: SHIPPED | OUTPATIENT
Start: 2019-05-24

## 2019-05-24 RX ORDER — OXYCODONE HYDROCHLORIDE AND ACETAMINOPHEN 5; 325 MG/1; MG/1
1 TABLET ORAL EVERY 8 HOURS PRN
Qty: 90 TABLET | Refills: 0 | Status: SHIPPED | OUTPATIENT
Start: 2019-05-27 | End: 2019-06-26

## 2019-05-24 ASSESSMENT — ENCOUNTER SYMPTOMS
HOARSE VOICE: 1
RESPIRATORY NEGATIVE: 1
GASTROINTESTINAL NEGATIVE: 1
BACK PAIN: 1

## 2019-05-24 NOTE — PROGRESS NOTES
America Osorio PROGRESS NOTE      Patient here today to review Medication Agreement    Chief Complaint:  Low back pain      HPI: She c/o low back pain , more painful on right side. .She has multiple back surgeries. The patient has history of multiple back surgeries, the most recent was L2-L5 pedicle screw and nic fusion with decompression laminectomy done in November 2016. She also had tendinopathy of the left gluteus medius with surgical repair in January 2017      Back Pain   This is a chronic problem. The current episode started more than 1 year ago. The problem occurs constantly. The problem has been gradually worsening since onset. The pain is present in the lumbar spine. Quality: gnawing. Radiates to: down legs. The pain is at a severity of 7/10. The pain is moderate. Worse during: afternoon. The symptoms are aggravated by standing and bending (walking). Associated symptoms include numbness. (Left calf to foot) Risk factors include menopause, obesity, sedentary lifestyle and history of osteoporosis. She has tried analgesics and heat (icy hot ointment) for the symptoms. The treatment provided mild relief. PT never helped her. She does home exercises,   She had caudal epidural injection 9/2018 with 40% relief. She does not sleep well. She has had no Ed visits. She has trouble doing her housework. Patient denies any new neurological symptoms. No bowel or bladder incontinence, no weakness, and no falling. Treatment goals:  Functional status: reduce pain 2     Aberrancy:   Any alcoholic beverages   no    Any illegal drugs   no        Analgesia:pain 7      Adverse  Effects :BM daily     ADL;s :stretches, light housework, paces self             Pill count: appropriate    Morphine equivalent dose as reported on OARRS:22.50  Attestation: The Prescription Monitoring Report for this patient was reviewed today.  Randall Trinidad, APRN - CNP)  Chronic Pain Routine Monitoring: No signs of potential drug abuse or diversion identified: otherwise, see note documentation, Obtaining appropriate analgesic effect of treatment. (CHRISTIAN Dos Santos CNP)  Chronic Pain > 80 MEDD: Obtained or confirmed a written medication contract was on file. CHRISTIAN Dos Santos CNP)  Review ofOARRS does not show any aberrant prescription behavior. Medication is helping the patient stay active. Patient denies any side effects and reports adequate analgesia. No sign of misuse/abuse.               When was thelast UDS:   3-26-19          Was the UDS appropriate:yes        Record/Diagnostics Review:       As above, I did review the imaging  3/29/2019  9:48 PM - Reinaldo, M[facility]pn Incoming Lab Results From [facility] ([facility])      Component Value Ref Range & Units Status Collected Lab   Pain Management Drug Panel Interp, Urine Inconsistent    Final 03/26/2019 12:00 PM [facility]   (NOTE)   ________________________________________________________________   DRUGS EXPECTED:   HYDROCODONE [3/25/19]   ________________________________________________________________   CONSISTENT with medications provided:   HYDROCODONE : based on hydrocodone, norhydrocodone   ________________________________________________________________   INCONSISTENT with medications provided:   Lorazepam   ________________________________________________________________   INTERPRETIVE INFORMATION: Pain Mgt [place], [place] Spec/EMIT, Ur,                            Interp   Interpretation depends on accuracy and completeness of patient   medication information submitted by client.     6-Acetylmorphine, Ur Not Detected    Final 03/26/2019 12:00 PM [facility]   7-Aminoclonazepam, Urine Not Detected    Final 03/26/2019 12:00 PM [facility]   Alpha-OH-Alpraz, Urine Not Detected    Final 03/26/2019 12:00 PM [facility]   Alprazolam, Urine Not Detected    Final 03/26/2019 12:00 PM [facility]   Amphetamines, urine Not Detected    Final 03/26/2019 12:00 PM [facility]   Barbiturates, Ur Not Detected    Final 03/26/2019 12:00 PM [facility] Benzoylecgonine, Ur Not Detected    Final 03/26/2019 12:00 PM [facility]   Buprenorphine Urine Not Detected    Final 03/26/2019 12:00 PM [facility]   Carisoprodol, Ur Not Detected    Final 03/26/2019 12:00 PM [facility]   (NOTE)   The carisoprodol immunoassay has cross-reactivity to carisoprodol   and meprobamate.     Clonazepam, Urine Not Detected    Final 03/26/2019 12:00 PM [facility]   Codeine, Urine Not Detected    Final 03/26/2019 12:00 PM [facility]   MDA, Ur Not Detected    Final 03/26/2019 12:00 PM [facility]   Diazepam, Urine Not Detected    Final 03/26/2019 12:00 PM [facility]   Ethyl Glucuronide Ur Not Detected    Final 03/26/2019 12:00 PM [facility]   Fentanyl, Ur Not Detected    Final 03/26/2019 12:00 PM [facility]   Hydrocodone, Urine Present    Final 03/26/2019 12:00 PM [facility]   Hydromorphone, Urine Not Detected    Final 03/26/2019 12:00 PM [facility]   Lorazepam, Urine Present    Final 03/26/2019 12:00 PM [facility]   Marijuana Metab, Ur Not Detected    Final 03/26/2019 12:00 PM [facility]   MDEA, HUY, Ur Not Detected    Final 03/26/2019 12:00 PM [facility]   MDMA, Urine Not Detected    Final 03/26/2019 12:00 PM [facility]   Meperidine Metab, Ur Not Detected    Final 03/26/2019 12:00 PM [facility]   Methadone, Urine Not Detected    Final 03/26/2019 12:00 PM [facility]   Methamphetamine, Urine Not Detected    Final 03/26/2019 12:00 PM [facility]   Methylphenidate Not Detected    Final 03/26/2019 12:00 PM [facility]   Midazolam, Urine Not Detected    Final 03/26/2019 12:00 PM [facility]   Morphine Urine Not Detected    Final 03/26/2019 12:00 PM [facility]   Norbuprenorphine, Urine Not Detected    Final 03/26/2019 12:00 PM [facility]   Nordiazepam, Urine Not Detected    Final 03/26/2019 12:00 PM [facility]   Norfentanyl, Urine Not Detected    Final 03/26/2019 12:00 PM [facility]   NORHYDROCODONE, URINE Present    Final 03/26/2019 12:00 PM [facility]   Noroxycodone, Urine Not Detected    Final 03/26/2019 12:00 PM [facility]   NOROXYMORPHONE, URINE Not Detected    Final 03/26/2019 12:00 PM [facility]   Oxazepam, Urine Not Detected    Final 03/26/2019 12:00 PM [facility]   Oxycodone Urine Not Detected    Final 03/26/2019 12:00 PM [facility]   Oxymorphone, Urine Not Detected    Final 03/26/2019 12:00 PM [facility]   PCP, Urine Not Detected    Final 03/26/2019 12:00 PM [facility]   Phentermine, Ur Not Detected    Final 03/26/2019 12:00 PM [facility]   Propoxyphene, Urine Not Detected    Final 03/26/2019 12:00 PM [facility]   Tapentadol-O-Sulfate, Urine Not Detected    Final 03/26/2019 12:00 PM [facility]   Tapentadol, Urine Not Detected    Final 03/26/2019 12:00 PM [facility]   Temazepam, Urine Not Detected    Final 03/26/2019 12:00 PM [facility]   Tramadol, Urine Not Detected    Final 03/26/2019 12:00 PM [facility]   Zolpidem, Urine Not Detected    Final 03/26/2019 12:00 PM [facility]   Drugs Expected, Ur     Final 03/26/2019 12:00  [facility] [address] [facility]   HYDROCODONE ON 3/25/19 AT 9AM    Creatinine, Ur 77.5  20.0 - 400.0 mg/dL Final 03/26/2019 12:00 PM [facility]   Pain Mgt Drug Panel, Hi Res, Ur See Below    Final 03/26/2019 12:00 PM [facility]   (NOTE)   Methodology: Qualitative Enzyme Immunoassay and Qualitative Liquid   Chromatography-Time of Flight-Mass Spectrometry or Tandem Mass   Spectrometry, Quantitative Spectrophotometry   The absence of expected drug(s) and/or drug metabolite(s) may   indicate non-compliance, inappropriate timing of specimen   collection relative to drug administration, poor drug absorption,   diluted/adulterated urine, or limitations of testing. The   concentration must be greater than or equal to the cutoff to be   reported as present.  If specific drug concentrations are   required, contact the laboratory within two weeks of specimen   collection to request quantification by a second analytical   technique. Interpretive questions should be directed to the   laboratory. Results based on immunoassay detection that do not match clinical   expectations should be   interpreted with caution.  Confirmatory testing by mass   spectrometry for immunoassay-based results is available, if   ordered within two weeks of specimen collection. Additional   charges apply. For medical purposes only; not valid for forensic use. This test was developed and its performance characteristics   determined by [facility]. The U.S. Food and Drug   Administration has not approved or cleared this test; however, FDA   clearance or approval is not currently required for clinical use. The results are not intended to be used as the sole means for   clinical diagnosis or patient management decisions. EER Hi Res Interp Ur See Note    Final 03/26/2019 12:00 PM [facility]   (NOTE)   Access [facility] Enhanced Report using either link below:   -Direct access: [email or web address]   -Enter Username, Password: [email or web address]   Username: [email or web address]   Password: [email or web address]   Performed by [facility],   [place] , [address] [facility] [phone]   www. [place] [facility] Sherrie, Lab.  Director                          Past Medical History:   Diagnosis Date    Anxiety     Asthma     Chronic back pain     Depression     YEARS AGO NO RECENT PROBLEMS    Difficult intravenous access     AT TIMES    Diverticulitis     DVT [facility]deep [facility] thrombosis) ([facility])     Right upper arm    Dyslexia     GERD (gastroesophageal reflux disease) 2014    ON RX    Headache(784.0)     Hx of blood clots 2001    LT UPPER LEG TREATED WITH INJECTIONS THEN ORAL BLOOD THINNER FOR 1 YR    Hyperlipidemia 2014    ON RX    Hypertension 2014    ON RX    Migraine     Mitral valve prolapse 1970    NO SYMPTOMS    Sleep apnea 2006    ON MACHINE HAD SURGERY-NOW RESOLVED    Wears dentures     UPPER    Wears glasses        Past Surgical History:   Procedure Laterality Date    BACK SURGERY      LUMBAR UNSURE WHEN    BACK SURGERY  10/17/2016    hardware removal, revision posterior instrumentation    CERVICAL FUSION  2013    Anterior    COLONOSCOPY  7/16/2012    normal    HYSTERECTOMY  1986    TOTAL    SIGMOIDOSCOPY  6/20/2012    normal    SINUS SURGERY      SPINE SURGERY  2005    LUMBAR    TONSILLECTOMY  2002    UPPP, TURBINOPLASY    UPPER GASTROINTESTINAL ENDOSCOPY  8/14/2013    Bravo done, see note, hiatal hernia, Grade 1 erosive esophagitis    VARICOSE VEIN SURGERY Bilateral        Allergies   Allergen Reactions    Xarelto [Rivaroxaban] Hives, Swelling and Dermatitis    Morphine      HEADACHE, DROPS BLOOD PRESSURE      Seasonal     Adhesive Tape Hives         Current Outpatient Medications:     vitamin B-12 (CYANOCOBALAMIN) 1000 MCG tablet, Take 1,000 mcg by mouth daily, Disp: , Rfl:     [START ON 5/27/2019] oxyCODONE-acetaminophen (PERCOCET) 5-325 MG per tablet, Take 1 tablet by mouth every 8 hours as needed for Pain for up to 30 days. , Disp: 90 tablet, Rfl: 0    tiZANidine (ZANAFLEX) 4 MG tablet, Take 1 tablet by mouth every 8 hours as needed (muscle spasms), Disp: 90 tablet, Rfl: 0    calcium carbonate (OSCAL) 500 MG TABS tablet, Take 500 mg by mouth daily, Disp: , Rfl:     warfarin (COUMADIN) 2.5 MG tablet, 1 pill po daily, Disp: 90 tablet, Rfl: 3    metoprolol tartrate (LOPRESSOR) 25 MG tablet, TAKE 1 TABLET TWICE DAILY, Disp: 180 tablet, Rfl: 3    atorvastatin (LIPITOR) 20 MG tablet, Take 1 tablet by mouth nightly, Disp: 90 tablet, Rfl: 5    omeprazole (PRILOSEC) 20 MG delayed release capsule, Take 1 capsule by mouth Daily, Disp: 90 capsule, Rfl: 5    aspirin 81 MG tablet, Take 81 mg by mouth daily LAST DOSE 01/20/2017, Disp: , Rfl:     Specialty Vitamins Products (VITAMINS FOR THE HAIR PO), Take by mouth, Disp: , Rfl:     cyanocobalamin 1000 MCG/ML injection, Inject 1 mL into the muscle every 30 days, Disp: 1 mL, Rfl: 5    LORazepam (ATIVAN) 1 MG tablet, Take 1 tablet by mouth 2 times daily as needed for Anxiety, Disp: 60 tablet, Rfl: 0    nitroGLYCERIN (NITROSTAT) 0.4 MG SL tablet, , Disp: , Rfl:     Blood Pressure Monitoring (BLOOD PRESSURE CUFF) MISC, 1 each by Does not apply route 2 times daily, Disp: 1 each, Rfl: 0   albuterol (PROVENTIL) (2.5 MG/3ML) 0.083% nebulizer solution, INHALE THE CONTENTS OF 1 VIAL VIA NEBULIZER EVERY 6 HOURS AS NEEDED  FOR  WHEEZING (SUBSTITUTED FOR PROVENTIL), Disp: 360 mL, Rfl: 1    Family History   Problem Relation Age of Onset    Heart Disease Mother     Cancer Father         prostate    Cancer Brother         leukemia    Cancer Maternal Grandmother         colon    Cancer Paternal Grandmother         colon    Cancer Brother         PROSTATE    Cancer Brother         LUNG    Asthma Brother        Social History     Socioeconomic History    Marital status:      Spouse name: Not on file    Number of children: Not on file    Years of education: Not on file    Highest education level: Not on file   Occupational History    Occupation: Retired   Social Needs    Financial resource strain: Not on file    Food insecurity:     Worry: Not on file     Inability: Not on file   Rebit needs:     Medical: Not on file     Non-medical: Not on file   Tobacco Use    Smoking status: Never Smoker    Smokeless tobacco: Never Used   Substance and Sexual Activity    Alcohol use: No    Drug use: No    Sexual activity: Not on file   Lifestyle    Physical activity:     Days per week: Not on file     Minutes per session: Not on file    Stress: Not on file   Relationships    Social connections:     Talks on phone: Not on file     Gets together: Not on file     Attends [employer] service: Not on file     Active member of club or organization: Not on file     Attends meetings of clubs or organizations: Not on file     Relationship status: Not on file    Intimate partner violence:     Fear of current or ex partner: Not on file     Emotionally abused: Not on file     Physically abused: Not on file     Forced sexual activity: Not on file   Other Topics Concern    Not on file   Social History Narrative    Not on file       Review of Systems:  Review of Systems   Constitution: Negative. HENT: Positive for hoarse voice. Eyes:        Glasses   Cardiovascular: Negative. Respiratory: Negative. Endocrine: Negative. Hematologic/Lymphatic: Bruises/bleeds easily. Skin: Negative. Musculoskeletal: Positive for back pain and joint pain. Gastrointestinal: Negative. Genitourinary: Positive for nocturia. Neurological: Positive for numbness. Cane   Psychiatric/Behavioral: Negative. Physical Exam:  /70   Pulse 83   Temp 97.7 °F (36.5 °C) (Oral)   Resp 16   Ht 5' 5\" (1.651 m)   Wt 189 lb (85.7 kg)   LMP 10/05/1986 (Approximate)   SpO2 97%   BMI 31.45 kg/m²     Physical Exam   Constitutional: She appears well-developed. overweight   Neck: Normal range of motion. Neck supple. Pulmonary/Chest: Effort normal.   Musculoskeletal:        Lumbar back: She exhibits decreased range of motion and tenderness. Neurological: She is alert. A sensory deficit is present. Gait abnormal.   Reflex Scores:       Patellar reflexes are 1+ on the right side and 1+ on the left side. Achilles reflexes are 1+ on the right side and 1+ on the left side. Ambulates with a cane   Skin: Skin is warm, dry and intact. Psychiatric: She has a normal mood and affect.  Her speech is normal and behavior is normal. Judgment and thought content normal. Cognition and memory are normal.         Assessment:    Problem List Items Addressed This Visit     Sacroiliitis ([facility] [address]    Relevant Medications    oxyCODONE-acetaminophen (PERCOCET) 5-325 MG per tablet (Start on 5/27/2019)    tiZANidine (ZANAFLEX) 4 MG tablet    Osteoarthritis of spine with radiculopathy, lumbar region (Chronic)    Relevant Medications    oxyCODONE-acetaminophen (PERCOCET) 5-325 MG per tablet (Start on 5/27/2019)    tiZANidine (ZANAFLEX) 4 MG tablet    Myofascial pain - Primary    Relevant Medications    oxyCODONE-acetaminophen (PERCOCET) 5-325 MG per tablet (Start on 5/27/2019)    Lumbar radicular pain    Relevant

## 2019-08-31 ENCOUNTER — APPOINTMENT (OUTPATIENT)
Dept: GENERAL RADIOLOGY | Age: 69
End: 2019-08-31
Payer: MEDICARE

## 2019-08-31 ENCOUNTER — HOSPITAL ENCOUNTER (EMERGENCY)
Age: 69
Discharge: ANOTHER HOSPITAL | End: 2019-09-01
Attending: EMERGENCY MEDICINE
Payer: MEDICARE

## 2019-08-31 DIAGNOSIS — R07.9 CHEST PAIN, UNSPECIFIED TYPE: Primary | ICD-10-CM

## 2019-08-31 LAB
ABSOLUTE EOS #: 0.16 K/UL (ref 0–0.44)
ABSOLUTE IMMATURE GRANULOCYTE: 0.08 K/UL (ref 0–0.3)
ABSOLUTE LYMPH #: 3.07 K/UL (ref 1.1–3.7)
ABSOLUTE MONO #: 0.71 K/UL (ref 0.1–1.2)
ALBUMIN SERPL-MCNC: 4.3 G/DL (ref 3.5–5.2)
ALBUMIN/GLOBULIN RATIO: 1.4 (ref 1–2.5)
ALP BLD-CCNC: 99 U/L (ref 35–104)
ALT SERPL-CCNC: 19 U/L (ref 5–33)
ANION GAP SERPL CALCULATED.3IONS-SCNC: 13 MMOL/L (ref 9–17)
AST SERPL-CCNC: 21 U/L
BASOPHILS # BLD: 0 % (ref 0–2)
BASOPHILS ABSOLUTE: 0.04 K/UL (ref 0–0.2)
BILIRUB SERPL-MCNC: 0.39 MG/DL (ref 0.3–1.2)
BUN BLDV-MCNC: 14 MG/DL (ref 8–23)
BUN/CREAT BLD: 18 (ref 9–20)
CALCIUM SERPL-MCNC: 9.3 MG/DL (ref 8.6–10.4)
CHLORIDE BLD-SCNC: 100 MMOL/L (ref 98–107)
CO2: 25 MMOL/L (ref 20–31)
CREAT SERPL-MCNC: 0.77 MG/DL (ref 0.5–0.9)
DIFFERENTIAL TYPE: ABNORMAL
EOSINOPHILS RELATIVE PERCENT: 2 % (ref 1–4)
GFR AFRICAN AMERICAN: >60 ML/MIN
GFR NON-AFRICAN AMERICAN: >60 ML/MIN
GFR SERPL CREATININE-BSD FRML MDRD: ABNORMAL ML/MIN/{1.73_M2}
GFR SERPL CREATININE-BSD FRML MDRD: ABNORMAL ML/MIN/{1.73_M2}
GLUCOSE BLD-MCNC: 103 MG/DL (ref 70–99)
HCT VFR BLD CALC: 43.4 % (ref 36.3–47.1)
HEMOGLOBIN: 13.4 G/DL (ref 11.9–15.1)
IMMATURE GRANULOCYTES: 1 %
INR BLD: 1.3 (ref 0.9–1.2)
LYMPHOCYTES # BLD: 34 % (ref 24–43)
MCH RBC QN AUTO: 27.9 PG (ref 25.2–33.5)
MCHC RBC AUTO-ENTMCNC: 30.9 G/DL (ref 28.4–34.8)
MCV RBC AUTO: 90.4 FL (ref 82.6–102.9)
MONOCYTES # BLD: 8 % (ref 3–12)
NRBC AUTOMATED: 0 PER 100 WBC
PDW BLD-RTO: 14.5 % (ref 11.8–14.4)
PLATELET # BLD: 254 K/UL (ref 138–453)
PLATELET ESTIMATE: ABNORMAL
PMV BLD AUTO: 9.7 FL (ref 8.1–13.5)
POTASSIUM SERPL-SCNC: 4 MMOL/L (ref 3.7–5.3)
PROTHROMBIN TIME: 13.4 SEC (ref 9.7–12.2)
RBC # BLD: 4.8 M/UL (ref 3.95–5.11)
RBC # BLD: ABNORMAL 10*6/UL
SEG NEUTROPHILS: 55 % (ref 36–65)
SEGMENTED NEUTROPHILS ABSOLUTE COUNT: 5.06 K/UL (ref 1.5–8.1)
SODIUM BLD-SCNC: 138 MMOL/L (ref 135–144)
TOTAL PROTEIN: 7.3 G/DL (ref 6.4–8.3)
TROPONIN INTERP: NORMAL
TROPONIN T: <0.03 NG/ML
TROPONIN, HIGH SENSITIVITY: NORMAL NG/L (ref 0–14)
WBC # BLD: 9.1 K/UL (ref 3.5–11.3)
WBC # BLD: ABNORMAL 10*3/UL

## 2019-08-31 PROCEDURE — 96374 THER/PROPH/DIAG INJ IV PUSH: CPT

## 2019-08-31 PROCEDURE — 85379 FIBRIN DEGRADATION QUANT: CPT

## 2019-08-31 PROCEDURE — 85025 COMPLETE CBC W/AUTO DIFF WBC: CPT

## 2019-08-31 PROCEDURE — 99285 EMERGENCY DEPT VISIT HI MDM: CPT

## 2019-08-31 PROCEDURE — 6360000002 HC RX W HCPCS: Performed by: EMERGENCY MEDICINE

## 2019-08-31 PROCEDURE — 84484 ASSAY OF TROPONIN QUANT: CPT

## 2019-08-31 PROCEDURE — 80053 COMPREHEN METABOLIC PANEL: CPT

## 2019-08-31 PROCEDURE — 85610 PROTHROMBIN TIME: CPT

## 2019-08-31 PROCEDURE — 93005 ELECTROCARDIOGRAM TRACING: CPT | Performed by: EMERGENCY MEDICINE

## 2019-08-31 PROCEDURE — 6370000000 HC RX 637 (ALT 250 FOR IP): Performed by: EMERGENCY MEDICINE

## 2019-08-31 PROCEDURE — 71046 X-RAY EXAM CHEST 2 VIEWS: CPT

## 2019-08-31 RX ORDER — ASPIRIN 81 MG/1
162 TABLET, CHEWABLE ORAL ONCE
Status: COMPLETED | OUTPATIENT
Start: 2019-08-31 | End: 2019-08-31

## 2019-08-31 RX ORDER — FENTANYL CITRATE 50 UG/ML
25 INJECTION, SOLUTION INTRAMUSCULAR; INTRAVENOUS ONCE
Status: COMPLETED | OUTPATIENT
Start: 2019-08-31 | End: 2019-08-31

## 2019-08-31 RX ADMIN — FENTANYL CITRATE 25 MCG: 50 INJECTION INTRAMUSCULAR; INTRAVENOUS at 23:35

## 2019-08-31 RX ADMIN — ASPIRIN 81 MG 162 MG: 81 TABLET ORAL at 23:09

## 2019-08-31 ASSESSMENT — PAIN DESCRIPTION - LOCATION: LOCATION: CHEST

## 2019-08-31 ASSESSMENT — PAIN SCALES - GENERAL
PAINLEVEL_OUTOF10: 7
PAINLEVEL_OUTOF10: 9
PAINLEVEL_OUTOF10: 9

## 2019-08-31 ASSESSMENT — PAIN DESCRIPTION - PAIN TYPE: TYPE: ACUTE PAIN

## 2019-08-31 ASSESSMENT — PAIN DESCRIPTION - PROGRESSION: CLINICAL_PROGRESSION: GRADUALLY IMPROVING

## 2019-09-01 ENCOUNTER — HOSPITAL ENCOUNTER (INPATIENT)
Age: 69
LOS: 2 days | Discharge: HOME | DRG: 313 | End: 2019-09-03
Attending: INTERNAL MEDICINE | Admitting: INTERNAL MEDICINE
Payer: MEDICARE

## 2019-09-01 VITALS
BODY MASS INDEX: 32.44 KG/M2 | DIASTOLIC BLOOD PRESSURE: 72 MMHG | WEIGHT: 190 LBS | HEIGHT: 64 IN | SYSTOLIC BLOOD PRESSURE: 106 MMHG | TEMPERATURE: 97.6 F | RESPIRATION RATE: 14 BRPM | OXYGEN SATURATION: 93 % | HEART RATE: 67 BPM

## 2019-09-01 PROBLEM — R07.9 CHEST PAIN: Status: ACTIVE | Noted: 2019-09-01

## 2019-09-01 LAB
D-DIMER QUANTITATIVE: 0.43 MG/L FEU (ref 0.19–0.5)
EKG ATRIAL RATE: 64 BPM
EKG P AXIS: 44 DEGREES
EKG P-R INTERVAL: 182 MS
EKG Q-T INTERVAL: 424 MS
EKG QRS DURATION: 74 MS
EKG QTC CALCULATION (BAZETT): 437 MS
EKG R AXIS: -7 DEGREES
EKG T AXIS: 30 DEGREES
EKG VENTRICULAR RATE: 64 BPM
TROPONIN INTERP: NORMAL
TROPONIN T: <0.03 NG/ML
TROPONIN T: NORMAL NG/ML
TROPONIN T: NORMAL NG/ML
TROPONIN, HIGH SENSITIVITY: <6 NG/L (ref 0–14)
TROPONIN, HIGH SENSITIVITY: <6 NG/L (ref 0–14)
TROPONIN, HIGH SENSITIVITY: NORMAL NG/L (ref 0–14)

## 2019-09-01 PROCEDURE — 93010 ELECTROCARDIOGRAM REPORT: CPT | Performed by: INTERNAL MEDICINE

## 2019-09-01 PROCEDURE — 6360000002 HC RX W HCPCS: Performed by: NURSE PRACTITIONER

## 2019-09-01 PROCEDURE — 6370000000 HC RX 637 (ALT 250 FOR IP): Performed by: EMERGENCY MEDICINE

## 2019-09-01 PROCEDURE — 1200000000 HC SEMI PRIVATE

## 2019-09-01 PROCEDURE — 96372 THER/PROPH/DIAG INJ SC/IM: CPT

## 2019-09-01 PROCEDURE — 99222 1ST HOSP IP/OBS MODERATE 55: CPT | Performed by: INTERNAL MEDICINE

## 2019-09-01 PROCEDURE — 2580000003 HC RX 258: Performed by: NURSE PRACTITIONER

## 2019-09-01 PROCEDURE — G0379 DIRECT REFER HOSPITAL OBSERV: HCPCS

## 2019-09-01 PROCEDURE — 84484 ASSAY OF TROPONIN QUANT: CPT

## 2019-09-01 PROCEDURE — 96374 THER/PROPH/DIAG INJ IV PUSH: CPT

## 2019-09-01 PROCEDURE — 6370000000 HC RX 637 (ALT 250 FOR IP): Performed by: NURSE PRACTITIONER

## 2019-09-01 PROCEDURE — 6360000002 HC RX W HCPCS: Performed by: EMERGENCY MEDICINE

## 2019-09-01 PROCEDURE — 36415 COLL VENOUS BLD VENIPUNCTURE: CPT

## 2019-09-01 PROCEDURE — 6370000000 HC RX 637 (ALT 250 FOR IP): Performed by: INTERNAL MEDICINE

## 2019-09-01 PROCEDURE — 93005 ELECTROCARDIOGRAM TRACING: CPT | Performed by: EMERGENCY MEDICINE

## 2019-09-01 PROCEDURE — G0378 HOSPITAL OBSERVATION PER HR: HCPCS

## 2019-09-01 PROCEDURE — 96375 TX/PRO/DX INJ NEW DRUG ADDON: CPT

## 2019-09-01 RX ORDER — NITROGLYCERIN 0.4 MG/1
0.4 TABLET SUBLINGUAL EVERY 5 MIN PRN
Status: DISCONTINUED | OUTPATIENT
Start: 2019-09-01 | End: 2019-09-03 | Stop reason: HOSPADM

## 2019-09-01 RX ORDER — ATORVASTATIN CALCIUM 40 MG/1
40 TABLET, FILM COATED ORAL NIGHTLY
Status: DISCONTINUED | OUTPATIENT
Start: 2019-09-01 | End: 2019-09-03 | Stop reason: HOSPADM

## 2019-09-01 RX ORDER — SODIUM CHLORIDE 0.9 % (FLUSH) 0.9 %
10 SYRINGE (ML) INJECTION EVERY 12 HOURS SCHEDULED
Status: DISCONTINUED | OUTPATIENT
Start: 2019-09-01 | End: 2019-09-03 | Stop reason: HOSPADM

## 2019-09-01 RX ORDER — OXYCODONE HYDROCHLORIDE AND ACETAMINOPHEN 5; 325 MG/1; MG/1
1 TABLET ORAL EVERY 8 HOURS PRN
Status: DISCONTINUED | OUTPATIENT
Start: 2019-09-01 | End: 2019-09-03 | Stop reason: HOSPADM

## 2019-09-01 RX ORDER — SODIUM CHLORIDE 0.9 % (FLUSH) 0.9 %
10 SYRINGE (ML) INJECTION PRN
Status: DISCONTINUED | OUTPATIENT
Start: 2019-09-01 | End: 2019-09-03 | Stop reason: HOSPADM

## 2019-09-01 RX ORDER — ACYCLOVIR 200 MG/1
800 CAPSULE ORAL
Status: DISCONTINUED | OUTPATIENT
Start: 2019-09-01 | End: 2019-09-01 | Stop reason: HOSPADM

## 2019-09-01 RX ORDER — LORAZEPAM 2 MG/ML
0.5 INJECTION INTRAMUSCULAR ONCE
Status: COMPLETED | OUTPATIENT
Start: 2019-09-01 | End: 2019-09-01

## 2019-09-01 RX ORDER — MAGNESIUM SULFATE 1 G/100ML
1 INJECTION INTRAVENOUS PRN
Status: DISCONTINUED | OUTPATIENT
Start: 2019-09-01 | End: 2019-09-03 | Stop reason: HOSPADM

## 2019-09-01 RX ORDER — POTASSIUM CHLORIDE 20 MEQ/1
40 TABLET, EXTENDED RELEASE ORAL PRN
Status: DISCONTINUED | OUTPATIENT
Start: 2019-09-01 | End: 2019-09-03 | Stop reason: HOSPADM

## 2019-09-01 RX ORDER — WARFARIN SODIUM 2.5 MG/1
2.5 TABLET ORAL DAILY
Status: DISCONTINUED | OUTPATIENT
Start: 2019-09-01 | End: 2019-09-02

## 2019-09-01 RX ORDER — POTASSIUM CHLORIDE 7.45 MG/ML
10 INJECTION INTRAVENOUS PRN
Status: DISCONTINUED | OUTPATIENT
Start: 2019-09-01 | End: 2019-09-03 | Stop reason: HOSPADM

## 2019-09-01 RX ORDER — LORAZEPAM 2 MG/ML
1 INJECTION INTRAMUSCULAR ONCE
Status: COMPLETED | OUTPATIENT
Start: 2019-09-01 | End: 2019-09-01

## 2019-09-01 RX ORDER — ONDANSETRON 2 MG/ML
4 INJECTION INTRAMUSCULAR; INTRAVENOUS EVERY 6 HOURS PRN
Status: DISCONTINUED | OUTPATIENT
Start: 2019-09-01 | End: 2019-09-03 | Stop reason: HOSPADM

## 2019-09-01 RX ORDER — ACETAMINOPHEN 325 MG/1
650 TABLET ORAL EVERY 4 HOURS PRN
Status: DISCONTINUED | OUTPATIENT
Start: 2019-09-01 | End: 2019-09-03 | Stop reason: HOSPADM

## 2019-09-01 RX ADMIN — LORAZEPAM 1 MG: 2 INJECTION INTRAMUSCULAR; INTRAVENOUS at 22:12

## 2019-09-01 RX ADMIN — WARFARIN SODIUM 2.5 MG: 2.5 TABLET ORAL at 19:17

## 2019-09-01 RX ADMIN — DESMOPRESSIN ACETATE 40 MG: 0.2 TABLET ORAL at 20:55

## 2019-09-01 RX ADMIN — ENOXAPARIN SODIUM 40 MG: 40 INJECTION SUBCUTANEOUS at 11:04

## 2019-09-01 RX ADMIN — LORAZEPAM 0.5 MG: 2 INJECTION INTRAMUSCULAR; INTRAVENOUS at 03:38

## 2019-09-01 RX ADMIN — ACYCLOVIR 800 MG: 200 CAPSULE ORAL at 01:51

## 2019-09-01 RX ADMIN — OXYCODONE HYDROCHLORIDE AND ACETAMINOPHEN 1 TABLET: 5; 325 TABLET ORAL at 13:20

## 2019-09-01 RX ADMIN — Medication 10 ML: at 11:04

## 2019-09-01 RX ADMIN — Medication 10 ML: at 20:55

## 2019-09-01 ASSESSMENT — PAIN DESCRIPTION - PAIN TYPE
TYPE: ACUTE PAIN

## 2019-09-01 ASSESSMENT — PAIN DESCRIPTION - PROGRESSION
CLINICAL_PROGRESSION: NOT CHANGED
CLINICAL_PROGRESSION: GRADUALLY IMPROVING
CLINICAL_PROGRESSION: NOT CHANGED

## 2019-09-01 ASSESSMENT — PAIN SCALES - GENERAL
PAINLEVEL_OUTOF10: 5
PAINLEVEL_OUTOF10: 9
PAINLEVEL_OUTOF10: 6
PAINLEVEL_OUTOF10: 7
PAINLEVEL_OUTOF10: 6
PAINLEVEL_OUTOF10: 6

## 2019-09-01 ASSESSMENT — PAIN DESCRIPTION - ORIENTATION
ORIENTATION: MID
ORIENTATION: MID

## 2019-09-01 ASSESSMENT — ENCOUNTER SYMPTOMS
NAUSEA: 0
BACK PAIN: 1
VOMITING: 0
WHEEZING: 0
COUGH: 0
SHORTNESS OF BREATH: 0
PHOTOPHOBIA: 0
BLOOD IN STOOL: 0
ABDOMINAL DISTENTION: 0

## 2019-09-01 ASSESSMENT — PAIN DESCRIPTION - LOCATION
LOCATION: CHEST
LOCATION: CHEST
LOCATION: JAW;CHEST

## 2019-09-01 ASSESSMENT — PAIN DESCRIPTION - FREQUENCY: FREQUENCY: CONTINUOUS

## 2019-09-01 ASSESSMENT — PAIN DESCRIPTION - DESCRIPTORS: DESCRIPTORS: PENETRATING;PRESSURE

## 2019-09-01 ASSESSMENT — PAIN DESCRIPTION - ONSET: ONSET: ON-GOING

## 2019-09-01 NOTE — H&P
change was found    Echo 2017:  Global left ventricular systolic function appears preserved with an  estimated ejection fraction of 60%. Mildly increased left ventricular wall thickness with a normal left  ventricular cavity size. The patient has a sigmoid interventricular septum  without evidence of outflow tract obstruction. Mild tricuspid regurgitation. No clear evidence of diastolic dysfunction was identified. History of DVT/hypercoagulable state  Has been on Coumadin  Results for Renetta Senior (MRN [id number]) as of 9/1/2019 18:23   Ref.  Range 1/30/2017 05:55 1/31/2017 05:50 5/24/2017 00:00 9/18/2018 11:12 8/31/2019 22:10   INR Latest Ref Range: 0.9 - 1.2  2.0 2.6 2.5 1.0 1.3 (H)       Past Medical History:     Past Medical History:   Diagnosis Date    Anxiety     Asthma     Chronic back pain     Depression     YEARS AGO NO RECENT PROBLEMS    Difficult intravenous access     AT TIMES    Diverticulitis     DVT (deep venous thrombosis) ([facility]CC)     Right upper arm    Dyslexia     GERD (gastroesophageal reflux disease) 2014    ON RX    Headache(784.0)     Hx of blood clots 2001    LT UPPER LEG TREATED WITH INJECTIONS THEN ORAL BLOOD THINNER FOR 1 YR    Hyperlipidemia 2014    ON RX    Hypertension 2014    ON RX    Migraine     Mitral valve prolapse 1970    NO SYMPTOMS    Sleep apnea 2006    ON MACHINE HAD SURGERY-NOW RESOLVED    Wears dentures     UPPER    Wears glasses         Past Surgical History:     Past Surgical History:   Procedure Laterality Date    BACK SURGERY      LUMBAR UNSURE WHEN    BACK SURGERY  10/17/2016    hardware removal, revision posterior instrumentation    CERVICAL FUSION  2013    Anterior    COLONOSCOPY  7/16/2012    normal    HYSTERECTOMY  1986    TOTAL    SIGMOIDOSCOPY  6/20/2012    normal    SINUS SURGERY      SPINE SURGERY  2005    LUMBAR    TONSILLECTOMY  2002    UPPP, TURBINOPLASY    UPPER GASTROINTESTINAL ENDOSCOPY  8/14/2013    Bravo done, see note, (Last 24 hours) at 9/1/2019 1828  Last data filed at 9/1/2019 1812  Gross per 24 hour   Intake 750 ml   Output 600 ml   Net 150 ml       Physical Exam   Constitutional: She is oriented to person, place, and time. No distress. HENT:   Head: Normocephalic. Nose: Nose normal.   Eyes: Conjunctivae are normal. No scleral icterus. Neck: Neck supple. No tracheal deviation present. Cardiovascular: Normal rate and regular rhythm. Pulmonary/Chest: Effort normal and breath sounds normal. No respiratory distress. She has no wheezes. She has no rales. She exhibits no tenderness. Abdominal: Soft. Bowel sounds are normal. She exhibits no distension. There is no tenderness. Musculoskeletal: She exhibits no edema or tenderness. Neurological: She is alert and oriented to person, place, and time. Skin: Skin is warm and dry. She is not diaphoretic. Psychiatric: Her behavior is normal. Thought content normal.   Vitals reviewed.       Investigations:      Laboratory Testing:  Recent Results (from the past 24 hour(s))   CBC Auto Differential    Collection Time: 08/31/19 10:10 PM   Result Value Ref Range    WBC 9.1 3.5 - 11.3 k/uL    RBC 4.80 3.95 - 5.11 m/uL    Hemoglobin 13.4 11.9 - 15.1 g/dL    Hematocrit 43.4 36.3 - 47.1 %    MCV 90.4 82.6 - 102.9 fL    MCH 27.9 25.2 - 33.5 pg    MCHC 30.9 28.4 - 34.8 g/dL    RDW 14.5 (H) 11.8 - 14.4 %    Platelets 612 571 - 506 k/uL    MPV 9.7 8.1 - 13.5 fL    NRBC Automated 0.0 0.0 per 100 WBC    Differential Type NOT REPORTED     Seg Neutrophils 55 36 - 65 %    Lymphocytes 34 24 - 43 %    Monocytes 8 3 - 12 %    Eosinophils % 2 1 - 4 %    Basophils 0 0 - 2 %    Immature Granulocytes 1 (H) 0 %    Segs Absolute 5.06 1.50 - 8.10 k/uL    Absolute Lymph # 3.07 1.10 - 3.70 k/uL    Absolute Mono # 0.71 0.10 - 1.20 k/uL    Absolute Eos # 0.16 0.00 - 0.44 k/uL    Basophils Absolute 0.04 0.00 - 0.20 k/uL    Absolute Immature Granulocyte 0.08 0.00 - 0.30 k/uL    WBC Morphology NOT REPORTED Troponin    Collection Time: 09/01/19  3:30 PM   Result Value Ref Range    Troponin, High Sensitivity <6 0 - 14 ng/L    Troponin T NOT REPORTED <0.03 ng/mL    Troponin Interp NOT REPORTED        Imaging/Diagnostics:    Xr Chest Standard (2 Vw)    Result Date: 8/31/2019  No acute cardiopulmonary abnormality. Assessment :      Primary Problem  Chest pain    Active Hospital Problems    Diagnosis Date Noted    Hypertension [I10]      Priority: High    GERD (gastroesophageal reflux disease) [K21.9]      Priority: High    DDD (degenerative disc disease), cervical [M50.30] 04/29/2014     Priority: Low    DDD (degenerative disc disease), lumbar [M51.36] 04/29/2014     Priority: Low    Chronic back pain [M54.9, G89.29]      Priority: Low    Chest pain [R07.9] 09/01/2019    Cervical post-laminectomy syndrome [M96.1]     Long term current use of anticoagulant therapy [Z79.01] 12/02/2016    Deep vein thrombosis (DVT) of axillary vein of right upper extremity ([facility] [address] [T85. A11] 10/28/2016    POONAM (obstructive sleep apnea) [G47.33]     B12 deficiency [E53.8] 04/11/2016    Anemia, pernicious [D51.0] 01/29/2015       Plan:     Admit  EKG  Enzymes  Stress test   Aspirin  Lipitor  Lovenox until INR therapeutic  Blood Pressure - Monitor and control  Continue Coumadin for hypercoagulable state, history DVT  CPAP  Ongoing risk factor management  Reflux precautions   B12 supplementation     Consultations:   IP CONSULT TO Jimi     Patient is admitted as inpatient status because of co-morbidities listed above, severity of signs and symptoms as outlined, requirement for current medical therapies and most importantly because of direct risk to patient if care not provided in a hospital setting.     Leatha Olsen DO  9/1/2019  6:28 PM    Copy sent to Dr. Max Menjivar

## 2019-09-02 PROBLEM — R79.1 SUBTHERAPEUTIC INTERNATIONAL NORMALIZED RATIO (INR): Status: ACTIVE | Noted: 2019-09-02

## 2019-09-02 LAB
ALBUMIN SERPL-MCNC: 3.8 G/DL (ref 3.5–5.2)
ALBUMIN/GLOBULIN RATIO: 1.4 (ref 1–2.5)
ALP BLD-CCNC: 91 U/L (ref 35–104)
ALT SERPL-CCNC: 15 U/L (ref 5–33)
ANION GAP SERPL CALCULATED.3IONS-SCNC: 10 MMOL/L (ref 9–17)
AST SERPL-CCNC: 18 U/L
BILIRUB SERPL-MCNC: 0.58 MG/DL (ref 0.3–1.2)
BUN BLDV-MCNC: 10 MG/DL (ref 8–23)
BUN/CREAT BLD: NORMAL (ref 9–20)
CALCIUM SERPL-MCNC: 9 MG/DL (ref 8.6–10.4)
CHLORIDE BLD-SCNC: 101 MMOL/L (ref 98–107)
CHOLESTEROL/HDL RATIO: 4.8
CHOLESTEROL: 174 MG/DL
CO2: 26 MMOL/L (ref 20–31)
CREAT SERPL-MCNC: 0.62 MG/DL (ref 0.5–0.9)
GFR AFRICAN AMERICAN: >60 ML/MIN
GFR NON-AFRICAN AMERICAN: >60 ML/MIN
GFR SERPL CREATININE-BSD FRML MDRD: NORMAL ML/MIN/{1.73_M2}
GFR SERPL CREATININE-BSD FRML MDRD: NORMAL ML/MIN/{1.73_M2}
GLUCOSE BLD-MCNC: 94 MG/DL (ref 70–99)
HCT VFR BLD CALC: 44.1 % (ref 36.3–47.1)
HDLC SERPL-MCNC: 36 MG/DL
HEMOGLOBIN: 13.4 G/DL (ref 11.9–15.1)
INR BLD: 1.2
LDL CHOLESTEROL: 78 MG/DL (ref 0–130)
MAGNESIUM: 2.2 MG/DL (ref 1.6–2.6)
MCH RBC QN AUTO: 27.9 PG (ref 25.2–33.5)
MCHC RBC AUTO-ENTMCNC: 30.4 G/DL (ref 28.4–34.8)
MCV RBC AUTO: 91.9 FL (ref 82.6–102.9)
NRBC AUTOMATED: 0 PER 100 WBC
PDW BLD-RTO: 14.8 % (ref 11.8–14.4)
PLATELET # BLD: 243 K/UL (ref 138–453)
PMV BLD AUTO: 9.7 FL (ref 8.1–13.5)
POTASSIUM SERPL-SCNC: 4.1 MMOL/L (ref 3.7–5.3)
PROTHROMBIN TIME: 13 SEC (ref 9–12)
RBC # BLD: 4.8 M/UL (ref 3.95–5.11)
SODIUM BLD-SCNC: 137 MMOL/L (ref 135–144)
TOTAL PROTEIN: 6.6 G/DL (ref 6.4–8.3)
TRIGL SERPL-MCNC: 302 MG/DL
VLDLC SERPL CALC-MCNC: ABNORMAL MG/DL (ref 1–30)
WBC # BLD: 7.9 K/UL (ref 3.5–11.3)

## 2019-09-02 PROCEDURE — 97535 SELF CARE MNGMENT TRAINING: CPT

## 2019-09-02 PROCEDURE — 99232 SBSQ HOSP IP/OBS MODERATE 35: CPT | Performed by: INTERNAL MEDICINE

## 2019-09-02 PROCEDURE — 97162 PT EVAL MOD COMPLEX 30 MIN: CPT

## 2019-09-02 PROCEDURE — 96372 THER/PROPH/DIAG INJ SC/IM: CPT

## 2019-09-02 PROCEDURE — 6360000002 HC RX W HCPCS: Performed by: NURSE PRACTITIONER

## 2019-09-02 PROCEDURE — 97166 OT EVAL MOD COMPLEX 45 MIN: CPT

## 2019-09-02 PROCEDURE — G0378 HOSPITAL OBSERVATION PER HR: HCPCS

## 2019-09-02 PROCEDURE — 97530 THERAPEUTIC ACTIVITIES: CPT

## 2019-09-02 PROCEDURE — 85610 PROTHROMBIN TIME: CPT

## 2019-09-02 PROCEDURE — 83735 ASSAY OF MAGNESIUM: CPT

## 2019-09-02 PROCEDURE — 6370000000 HC RX 637 (ALT 250 FOR IP): Performed by: INTERNAL MEDICINE

## 2019-09-02 PROCEDURE — 80053 COMPREHEN METABOLIC PANEL: CPT

## 2019-09-02 PROCEDURE — 80061 LIPID PANEL: CPT

## 2019-09-02 PROCEDURE — 36415 COLL VENOUS BLD VENIPUNCTURE: CPT

## 2019-09-02 PROCEDURE — 85027 COMPLETE CBC AUTOMATED: CPT

## 2019-09-02 PROCEDURE — 93005 ELECTROCARDIOGRAM TRACING: CPT | Performed by: NURSE PRACTITIONER

## 2019-09-02 PROCEDURE — 1200000000 HC SEMI PRIVATE

## 2019-09-02 PROCEDURE — 6370000000 HC RX 637 (ALT 250 FOR IP): Performed by: NURSE PRACTITIONER

## 2019-09-02 PROCEDURE — 2580000003 HC RX 258: Performed by: NURSE PRACTITIONER

## 2019-09-02 RX ORDER — WARFARIN SODIUM 2 MG/1
4 TABLET ORAL DAILY
Status: DISCONTINUED | OUTPATIENT
Start: 2019-09-02 | End: 2019-09-03 | Stop reason: HOSPADM

## 2019-09-02 RX ORDER — ADHESIVE BANDAGE 3/4"
1 BANDAGE TOPICAL 2 TIMES DAILY
Status: DISCONTINUED | OUTPATIENT
Start: 2019-09-02 | End: 2019-09-02 | Stop reason: CLARIF

## 2019-09-02 RX ORDER — PANTOPRAZOLE SODIUM 40 MG/1
40 TABLET, DELAYED RELEASE ORAL
Status: DISCONTINUED | OUTPATIENT
Start: 2019-09-02 | End: 2019-09-03 | Stop reason: HOSPADM

## 2019-09-02 RX ORDER — MAGNESIUM HYDROXIDE/ALUMINUM HYDROXICE/SIMETHICONE 120; 1200; 1200 MG/30ML; MG/30ML; MG/30ML
30 SUSPENSION ORAL EVERY 6 HOURS PRN
Status: DISCONTINUED | OUTPATIENT
Start: 2019-09-02 | End: 2019-09-03 | Stop reason: HOSPADM

## 2019-09-02 RX ORDER — WARFARIN SODIUM 2.5 MG/1
2.5 TABLET ORAL DAILY
Status: DISCONTINUED | OUTPATIENT
Start: 2019-09-02 | End: 2019-09-02 | Stop reason: SDUPTHER

## 2019-09-02 RX ORDER — TIZANIDINE 4 MG/1
4 TABLET ORAL EVERY 8 HOURS PRN
Status: DISCONTINUED | OUTPATIENT
Start: 2019-09-02 | End: 2019-09-03 | Stop reason: HOSPADM

## 2019-09-02 RX ORDER — CALCIUM CARBONATE 200(500)MG
500 TABLET,CHEWABLE ORAL 3 TIMES DAILY PRN
Status: DISCONTINUED | OUTPATIENT
Start: 2019-09-02 | End: 2019-09-03 | Stop reason: HOSPADM

## 2019-09-02 RX ORDER — CALCIUM CARBONATE 500(1250)
500 TABLET ORAL DAILY
Status: DISCONTINUED | OUTPATIENT
Start: 2019-09-02 | End: 2019-09-03 | Stop reason: HOSPADM

## 2019-09-02 RX ORDER — LORAZEPAM 1 MG/1
1 TABLET ORAL 2 TIMES DAILY PRN
Status: DISCONTINUED | OUTPATIENT
Start: 2019-09-02 | End: 2019-09-03 | Stop reason: HOSPADM

## 2019-09-02 RX ORDER — ALBUTEROL SULFATE 2.5 MG/3ML
2.5 SOLUTION RESPIRATORY (INHALATION) EVERY 4 HOURS PRN
Status: DISCONTINUED | OUTPATIENT
Start: 2019-09-02 | End: 2019-09-03 | Stop reason: HOSPADM

## 2019-09-02 RX ORDER — CHOLECALCIFEROL (VITAMIN D3) 125 MCG
1000 CAPSULE ORAL DAILY
Status: DISCONTINUED | OUTPATIENT
Start: 2019-09-02 | End: 2019-09-03 | Stop reason: HOSPADM

## 2019-09-02 RX ADMIN — LORAZEPAM 1 MG: 1 TABLET ORAL at 20:59

## 2019-09-02 RX ADMIN — OXYCODONE HYDROCHLORIDE AND ACETAMINOPHEN 1 TABLET: 5; 325 TABLET ORAL at 09:02

## 2019-09-02 RX ADMIN — WARFARIN SODIUM 4 MG: 2 TABLET ORAL at 17:53

## 2019-09-02 RX ADMIN — DESMOPRESSIN ACETATE 40 MG: 0.2 TABLET ORAL at 20:59

## 2019-09-02 RX ADMIN — ANTACID TABLETS 500 MG: 500 TABLET, CHEWABLE ORAL at 15:36

## 2019-09-02 RX ADMIN — ENOXAPARIN SODIUM 40 MG: 40 INJECTION SUBCUTANEOUS at 09:02

## 2019-09-02 RX ADMIN — OXYCODONE HYDROCHLORIDE AND ACETAMINOPHEN 1 TABLET: 5; 325 TABLET ORAL at 17:53

## 2019-09-02 RX ADMIN — Medication 10 ML: at 09:04

## 2019-09-02 RX ADMIN — ASPIRIN 325 MG: 325 TABLET, COATED ORAL at 09:02

## 2019-09-02 RX ADMIN — Medication 1000 MCG: at 09:02

## 2019-09-02 RX ADMIN — CALCIUM 500 MG: 500 TABLET ORAL at 09:44

## 2019-09-02 ASSESSMENT — PAIN SCALES - GENERAL
PAINLEVEL_OUTOF10: 6
PAINLEVEL_OUTOF10: 5
PAINLEVEL_OUTOF10: 5
PAINLEVEL_OUTOF10: 6

## 2019-09-02 ASSESSMENT — PAIN DESCRIPTION - ORIENTATION: ORIENTATION: RIGHT

## 2019-09-02 ASSESSMENT — PAIN DESCRIPTION - PAIN TYPE
TYPE: CHRONIC PAIN
TYPE: CHRONIC PAIN

## 2019-09-02 ASSESSMENT — PAIN DESCRIPTION - LOCATION
LOCATION: HIP
LOCATION: BACK

## 2019-09-02 ASSESSMENT — PAIN DESCRIPTION - DESCRIPTORS: DESCRIPTORS: DISCOMFORT

## 2019-09-02 NOTE — PLAN OF CARE
Problem: Falls - Risk of:  Goal: Will remain free from falls  Description  Will remain free from falls  9/2/2019 0220 by Aldo Benjamin RN  Outcome: Ongoing  9/1/2019 1631 by Paula Aviles RN  Outcome: Ongoing  Goal: Absence of physical injury  Description  Absence of physical injury  9/2/2019 0220 by Aldo Benjamin RN  Outcome: Ongoing  9/1/2019 1631 by Paula Aviles RN  Outcome: Ongoing     Problem:  Activity:  Goal: Ability to tolerate increased activity will improve  Description  Ability to tolerate increased activity will improve  9/2/2019 0220 by Aldo Benjamin RN  Outcome: Ongoing  9/1/2019 1631 by Paula Aviles RN  Outcome: Ongoing     Problem: Cardiac:  Goal: Hemodynamic stability will improve  Description  Hemodynamic stability will improve  9/2/2019 0220 by Aldo Benjamin RN  Outcome: Ongoing  9/1/2019 1631 by Paula Aviles RN  Outcome: Ongoing  Goal: Complications related to the disease process, condition or treatment will be avoided or minimized  Description  Complications related to the disease process, condition or treatment will be avoided or minimized  9/2/2019 0220 by Aldo Benjamin RN  Outcome: Ongoing  9/1/2019 1631 by Paula Aviles RN  Outcome: Ongoing  Goal: Cerebral tissue perfusion will improve  Description  Cerebral tissue perfusion will improve  9/2/2019 0220 by Aldo Benjamin RN  Outcome: Ongoing  9/1/2019 1631 by Paula Aviles RN  Outcome: Ongoing     Problem: Coping:  Goal: Ability to identify and develop effective coping behavior will improve  Description  Ability to identify and develop effective coping behavior will improve  9/2/2019 0220 by Aldo Benjamin RN  Outcome: Ongoing  9/1/2019 1631 by Paula Aviles RN  Outcome: Ongoing     Problem: Health Behavior:  Goal: Identification of resources available to assist in meeting health care needs will improve  Description  Identification of resources available to
PROBLEMS:  Principal Problem:    Chest pain  Active Problems:    GERD (gastroesophageal reflux disease)    Hypertension    Chronic back pain    DDD (degenerative disc disease), lumbar    DDD (degenerative disc disease), cervical    Anemia, pernicious    B12 deficiency    POONAM (obstructive sleep apnea)    Deep vein thrombosis (DVT) of axillary vein of right upper extremity (HCC)    Long term current use of anticoagulant therapy    Cervical post-laminectomy syndrome  Resolved Problems:    * No resolved hospital problems.  *      UPDATED PLAN:  See current orders  Titrate meds as needed  Monitor BP for hypotension    IP CONSULT TO [address] [place] [address],   9/1/2019  6:35 PM

## 2019-09-03 ENCOUNTER — APPOINTMENT (OUTPATIENT)
Dept: NUCLEAR MEDICINE | Age: 69
DRG: 313 | End: 2019-09-03
Attending: INTERNAL MEDICINE
Payer: MEDICARE

## 2019-09-03 VITALS
RESPIRATION RATE: 16 BRPM | OXYGEN SATURATION: 94 % | SYSTOLIC BLOOD PRESSURE: 111 MMHG | TEMPERATURE: 97.7 F | WEIGHT: 196 LBS | BODY MASS INDEX: 33.46 KG/M2 | HEIGHT: 64 IN | DIASTOLIC BLOOD PRESSURE: 58 MMHG | HEART RATE: 61 BPM

## 2019-09-03 LAB
EKG ATRIAL RATE: 73 BPM
EKG ATRIAL RATE: 74 BPM
EKG P AXIS: 51 DEGREES
EKG P AXIS: 57 DEGREES
EKG P-R INTERVAL: 154 MS
EKG P-R INTERVAL: 166 MS
EKG Q-T INTERVAL: 400 MS
EKG Q-T INTERVAL: 404 MS
EKG QRS DURATION: 70 MS
EKG QRS DURATION: 72 MS
EKG QTC CALCULATION (BAZETT): 444 MS
EKG QTC CALCULATION (BAZETT): 445 MS
EKG R AXIS: -8 DEGREES
EKG T AXIS: 26 DEGREES
EKG T AXIS: 57 DEGREES
EKG VENTRICULAR RATE: 73 BPM
EKG VENTRICULAR RATE: 74 BPM
INR BLD: 1.4
LV EF: 68 %
LVEF MODALITY: NORMAL
PROTHROMBIN TIME: 14.8 SEC (ref 9–12)

## 2019-09-03 PROCEDURE — 93017 CV STRESS TEST TRACING ONLY: CPT

## 2019-09-03 PROCEDURE — G0378 HOSPITAL OBSERVATION PER HR: HCPCS

## 2019-09-03 PROCEDURE — 93010 ELECTROCARDIOGRAM REPORT: CPT | Performed by: FAMILY MEDICINE

## 2019-09-03 PROCEDURE — 78452 HT MUSCLE IMAGE SPECT MULT: CPT

## 2019-09-03 PROCEDURE — 85610 PROTHROMBIN TIME: CPT

## 2019-09-03 PROCEDURE — 93010 ELECTROCARDIOGRAM REPORT: CPT | Performed by: INTERNAL MEDICINE

## 2019-09-03 PROCEDURE — 6370000000 HC RX 637 (ALT 250 FOR IP): Performed by: INTERNAL MEDICINE

## 2019-09-03 PROCEDURE — 6360000002 HC RX W HCPCS: Performed by: INTERNAL MEDICINE

## 2019-09-03 PROCEDURE — 2580000003 HC RX 258: Performed by: INTERNAL MEDICINE

## 2019-09-03 PROCEDURE — 99232 SBSQ HOSP IP/OBS MODERATE 35: CPT | Performed by: INTERNAL MEDICINE

## 2019-09-03 PROCEDURE — 97110 THERAPEUTIC EXERCISES: CPT

## 2019-09-03 PROCEDURE — 2580000003 HC RX 258: Performed by: NURSE PRACTITIONER

## 2019-09-03 PROCEDURE — 3430000000 HC RX DIAGNOSTIC RADIOPHARMACEUTICAL: Performed by: INTERNAL MEDICINE

## 2019-09-03 PROCEDURE — A9500 TC99M SESTAMIBI: HCPCS | Performed by: INTERNAL MEDICINE

## 2019-09-03 PROCEDURE — 36415 COLL VENOUS BLD VENIPUNCTURE: CPT

## 2019-09-03 PROCEDURE — 97116 GAIT TRAINING THERAPY: CPT

## 2019-09-03 RX ORDER — SODIUM CHLORIDE 0.9 % (FLUSH) 0.9 %
10 SYRINGE (ML) INJECTION PRN
Status: DISCONTINUED | OUTPATIENT
Start: 2019-09-03 | End: 2019-09-03 | Stop reason: HOSPADM

## 2019-09-03 RX ORDER — WARFARIN SODIUM 3 MG/1
3 TABLET ORAL DAILY
Qty: 30 TABLET | Refills: 0 | Status: SHIPPED | OUTPATIENT
Start: 2019-09-03 | End: 2021-05-27

## 2019-09-03 RX ORDER — SODIUM CHLORIDE 9 MG/ML
500 INJECTION, SOLUTION INTRAVENOUS CONTINUOUS PRN
Status: DISCONTINUED | OUTPATIENT
Start: 2019-09-03 | End: 2019-09-03 | Stop reason: ALTCHOICE

## 2019-09-03 RX ORDER — AMINOPHYLLINE DIHYDRATE 25 MG/ML
50 INJECTION, SOLUTION INTRAVENOUS PRN
Status: DISCONTINUED | OUTPATIENT
Start: 2019-09-03 | End: 2019-09-03 | Stop reason: ALTCHOICE

## 2019-09-03 RX ORDER — ATROPINE SULFATE 0.1 MG/ML
0.5 INJECTION INTRAVENOUS EVERY 5 MIN PRN
Status: DISCONTINUED | OUTPATIENT
Start: 2019-09-03 | End: 2019-09-03 | Stop reason: ALTCHOICE

## 2019-09-03 RX ORDER — SODIUM CHLORIDE 0.9 % (FLUSH) 0.9 %
10 SYRINGE (ML) INJECTION PRN
Status: DISCONTINUED | OUTPATIENT
Start: 2019-09-03 | End: 2019-09-03 | Stop reason: ALTCHOICE

## 2019-09-03 RX ORDER — ALBUTEROL SULFATE 90 UG/1
2 AEROSOL, METERED RESPIRATORY (INHALATION) PRN
Status: DISCONTINUED | OUTPATIENT
Start: 2019-09-03 | End: 2019-09-03 | Stop reason: ALTCHOICE

## 2019-09-03 RX ADMIN — TETRAKIS(2-METHOXYISOBUTYLISOCYANIDE)COPPER(I) TETRAFLUOROBORATE 36 MILLICURIE: 1 INJECTION, POWDER, LYOPHILIZED, FOR SOLUTION INTRAVENOUS at 11:45

## 2019-09-03 RX ADMIN — Medication 10 ML: at 11:44

## 2019-09-03 RX ADMIN — OXYCODONE HYDROCHLORIDE AND ACETAMINOPHEN 1 TABLET: 5; 325 TABLET ORAL at 15:56

## 2019-09-03 RX ADMIN — Medication 10 ML: at 08:58

## 2019-09-03 RX ADMIN — REGADENOSON 0.4 MG: 0.08 INJECTION, SOLUTION INTRAVENOUS at 11:43

## 2019-09-03 RX ADMIN — TETRAKIS(2-METHOXYISOBUTYLISOCYANIDE)COPPER(I) TETRAFLUOROBORATE 16 MILLICURIE: 1 INJECTION, POWDER, LYOPHILIZED, FOR SOLUTION INTRAVENOUS at 07:34

## 2019-09-03 RX ADMIN — OXYCODONE HYDROCHLORIDE AND ACETAMINOPHEN 1 TABLET: 5; 325 TABLET ORAL at 06:56

## 2019-09-03 RX ADMIN — Medication 10 ML: at 11:32

## 2019-09-03 ASSESSMENT — PAIN SCALES - GENERAL
PAINLEVEL_OUTOF10: 8
PAINLEVEL_OUTOF10: 7
PAINLEVEL_OUTOF10: 6

## 2019-09-03 NOTE — PROGRESS NOTES
Smoking Cessation - topics covered   []  Health Risks  []  Benefits of Quitting   []  Smoking Cessation  [x]  Patient has no history of tobacco use  []  Patient is former smoker. [x]  No need for tobacco cessation education. []  Booklet given  []  Patient verbalizes understanding. []  Patient denies need for tobacco cessation education. []  Unable to meet with patient today. Will follow up as able.   Mart Jones  7:27 AM
pressure/discomfort,  palpitations  Gastrointestinal:  negative for abdominal pain, constipation, diarrhea, nausea, vomiting  Neurological:  negative for dizziness, headache    Medications: Allergies: Allergies   Allergen Reactions    Xarelto [Rivaroxaban] Hives, Swelling and Dermatitis    Morphine      HEADACHE, DROPS BLOOD PRESSURE      Seasonal     Adhesive Tape Hives       Current Meds:   Scheduled Meds:    metoprolol tartrate  25 mg Oral BID    pantoprazole  40 mg Oral QAM AC    calcium carbonate  500 mg Oral Daily    vitamin B-12  1,000 mcg Oral Daily    aspirin  325 mg Oral Daily    atorvastatin  40 mg Oral Nightly    enoxaparin  40 mg Subcutaneous Daily    sodium chloride flush  10 mL Intravenous 2 times per day    warfarin  2.5 mg Oral Daily    warfarin (COUMADIN) daily dosing (placeholder)   Other RX Placeholder     Continuous Infusions:   PRN Meds: albuterol, LORazepam, tiZANidine, magnesium sulfate, nitroGLYCERIN, potassium chloride **OR** potassium alternative oral replacement **OR** potassium chloride, potassium chloride, acetaminophen, magnesium hydroxide, ondansetron, sodium chloride flush, oxyCODONE-acetaminophen    Data:     Past Medical History:   has a past medical history of Anxiety, Asthma, Chronic back pain, Depression, Difficult intravenous access, Diverticulitis, DVT (deep venous thrombosis) ([facility] [address] Dyslexia, GERD (gastroesophageal reflux disease), Headache(784.0), Hx of blood clots, Hyperlipidemia, Hypertension, Migraine, Mitral valve prolapse, Sleep apnea, Wears dentures, and Wears glasses. Social History:   reports that she has never smoked. She has never used smokeless tobacco. She reports that she does not drink alcohol or use drugs.      Family History:   Family History   Problem Relation Age of Onset    Heart Disease Mother     Cancer Father         prostate    Cancer Brother         leukemia    Cancer Maternal Grandmother         colon    Cancer Paternal
FIO2  Lab Results   Component Value Date/Time    SPECIAL LEFT HAND 11/05/2016 07:20 PM     Lab Results   Component Value Date/Time    CULTURE NO GROWTH 5 DAYS 11/05/2016 07:20 PM    CULTURE  11/05/2016 07:20 PM     Performed at [facility], [address] [place] [address]    CULTURE  ([phone] 11/05/2016 07:20 PM       Radiology:  Suh Liter Chest Standard (2 Vw)    Result Date: 8/31/2019  No acute cardiopulmonary abnormality. Physical Examination:        General appearance:  alert, cooperative and no distress  Mental Status:  oriented to person, place and time and normal affect  Lungs:  clear to auscultation bilaterally, normal effort  Heart:  regular rate and rhythm, no murmur  Abdomen:  soft, nontender, nondistended, normal bowel sounds, no masses, hepatomegaly, splenomegaly  Extremities:  no edema, redness, tenderness in the calves  Skin:  no gross lesions, rashes, induration    Assessment:        Hospital Problems           Last Modified POA    * (Principal) Chest pain 9/1/2019 Yes    GERD (gastroesophageal reflux disease) 9/1/2019 Yes    Hypertension 9/1/2019 Yes    Chronic back pain 9/1/2019 Yes    DDD (degenerative disc disease), lumbar 9/1/2019 Yes    DDD (degenerative disc disease), cervical 9/1/2019 Yes    Anemia, pernicious 9/1/2019 Yes    B12 deficiency 9/1/2019 Yes    POONAM (obstructive sleep apnea) 9/1/2019 Yes    Pure hypercholesterolemia 9/2/2019 Yes    Deep vein thrombosis (DVT) of axillary vein of right upper extremity ([address] 9/1/2019 Yes    Long term current use of anticoagulant therapy 9/1/2019 Yes    Overview Signed 8/5/2018  5:10 PM by [address] [facility]     Replacing Deprecated Diagnoses         Cervical post-laminectomy syndrome 9/1/2019 Yes    Subtherapeutic international normalized ratio (INR) 9/2/2019 Yes          Plan:        1. Atypical chest pain- troponins negative, Negative NM stress test, EF 68%  ASA, Lipitor,  2. Hypertension- BP controlled  3.  History DVT- INR is

## 2019-09-04 ENCOUNTER — TELEPHONE (OUTPATIENT)
Dept: PHARMACY | Facility: CLINIC | Age: 69
End: 2019-09-04

## 2019-09-04 DIAGNOSIS — Z71.89 ENCOUNTER FOR MEDICATION REVIEW AND COUNSELING: Primary | ICD-10-CM

## 2019-09-04 PROCEDURE — 1111F DSCHRG MED/CURRENT MED MERGE: CPT | Performed by: PHARMACIST

## 2019-09-04 NOTE — TELEPHONE ENCOUNTER
Dr. Liseth Sanchez,   - Per patient, she has appointment with you next week. · Patient recently admitted for chest pain. Inquired if she has SL nitroglycerin at home and she does not. Recommend refilling prescription for patient so she has on hand if needed in future. · Our home medication list stated patient was taking aspirin but after speaking with patient she states she has not taken it in a while as she heard about drug interaction with warfarin. Advised patient to discuss with you to determine if she would benefit from aspirin therapy or not. · Lastly, patient did share she has a lot of stress going on in life right now with her son's leg amputation. Discussed importance of taking care of herself and having support. Consider discussing further at upcoming appoint - patient may benefit from therapy if able to afford/attend and/or antidepressant if needed. See note below for complete details. Thank you,  Gwendolyn Paez, PharmD, [facility]  Clinical Pharmacy Specialist  O: [phone]  C: [address] [place] [address]  [phone], [address]    =======================================================    CLINICAL PHARMACY NOTE - Post-Discharge Transitions of Care (BRENDA)  Patient outreach to review discharge medications and provide medication review and management. Spoke with patient. Non-face-to-face services provided:  Assessment and support for treatment adherence and medication management-Medication Review. SUBJECTIVE/OBJECTIVE:     Suze Lan is a 81 y.o. female discharged from [address] [place] [address] on 09/04/2019.   Primary diagnosis: chest pain    Discharge Medications (as per discharging medication list found on the AVS)  Changed Medications  Medication Sig Comments    warfarin (COUMADIN) 3 MG tablet Take 1 tablet by mouth daily Aware of increased dose, PCP follows INR/dosing - appt next week per pt    vitamin B-12 (CYANOCOBALAMIN) 1000 MCG tablet Take 1,000 Potential Medication Interactions:     No clinically significant interactions identified via [employer] Interaction Analysis as category D or higher. Renal Dosing:     Estimated Creatinine Clearance: 95 mL/min (based on SCr of 0.62 mg/dL). eGFR: >60 mL/min/1.73 m^2   No renal adjustments necessary. ASSESSMENT/PLAN:     Medication reconciliation completed. Home medication list updated. · Patient shared she was quite satisfied with care she received while hospitalized. · She did mention hip pain - she states worse than normal. Advised patient to outreach to PCP office if continues/worsens - she expressed understanding. · Patient did share she has been stressed with son's amputation and potential next amputation. Inquired about family and friends to talk to - she states she does have support and people to talk to. She also states her lorazepam does help her when she is feeling overwhelmed and anxious. Advised to discuss with PCP at appointment, especially if she is feeling more stressed - discussed the importance of taking care of herself, patient verbalized understanding. Follow up appointment(s) and dates  · Patient encouraged to schedule follow up with PCP - she states she has an appointment next week on Wednesday for hospital follow-up. Advised patient to reach out to office for sooner appointment if her hip pain continues or worsens as stated above.      Jose M Conte, PharmD, Altagracia Gallegos, [address]  Clinical Pharmacy Specialist  O: [phone]  C: [address] [place] [address]  [phone], [address]    =======================================================    For Pharmacy Admin Tracking Only  TCM Call Made?: Yes  [facility] Select Patient?: Yes  Total # of Interventions Recommended: 1 - Updated Order #: 1  Total # Interventions Accepted: 1  Intervention Severity:   - Level 1 Intervention Present?: No   - Level 2 #: 0   - Level 3 #: 0  Outreach Status: Review Complete  Care Coordinator Outreach to

## 2019-09-05 RX ORDER — OXYCODONE HYDROCHLORIDE AND ACETAMINOPHEN 5; 325 MG/1; MG/1
1 TABLET ORAL 3 TIMES DAILY PRN
Status: ON HOLD | COMMUNITY
End: 2020-01-17 | Stop reason: SDUPTHER

## 2019-09-05 RX ORDER — LORAZEPAM 1 MG/1
1 TABLET ORAL 3 TIMES DAILY PRN
COMMUNITY

## 2019-09-05 RX ORDER — METOPROLOL SUCCINATE 25 MG/1
25 TABLET, EXTENDED RELEASE ORAL DAILY PRN
COMMUNITY
End: 2020-01-14

## 2019-09-05 RX ORDER — MAGNESIUM 200 MG
1 TABLET ORAL DAILY
Refills: 11 | COMMUNITY
Start: 2019-07-15

## 2019-09-05 RX ORDER — OMEPRAZOLE 40 MG/1
40 CAPSULE, DELAYED RELEASE ORAL DAILY
COMMUNITY

## 2019-09-05 RX ORDER — OYSTER SHELL CALCIUM WITH VITAMIN D 500; 200 MG/1; [IU]/1
1 TABLET, FILM COATED ORAL 2 TIMES DAILY
COMMUNITY

## 2020-01-14 ENCOUNTER — APPOINTMENT (OUTPATIENT)
Dept: GENERAL RADIOLOGY | Age: 70
End: 2020-01-14
Payer: MEDICARE

## 2020-01-14 ENCOUNTER — HOSPITAL ENCOUNTER (EMERGENCY)
Age: 70
Discharge: ANOTHER HOSPITAL | End: 2020-01-15
Attending: EMERGENCY MEDICINE
Payer: MEDICARE

## 2020-01-14 LAB
-: ABNORMAL
ABSOLUTE EOS #: 0.16 K/UL (ref 0–0.44)
ABSOLUTE IMMATURE GRANULOCYTE: 0.03 K/UL (ref 0–0.3)
ABSOLUTE LYMPH #: 2.06 K/UL (ref 1.1–3.7)
ABSOLUTE MONO #: 0.53 K/UL (ref 0.1–1.2)
AMORPHOUS: ABNORMAL
ANION GAP SERPL CALCULATED.3IONS-SCNC: 13 MMOL/L (ref 9–17)
BACTERIA: ABNORMAL
BASOPHILS # BLD: 1 % (ref 0–2)
BASOPHILS ABSOLUTE: 0.04 K/UL (ref 0–0.2)
BILIRUBIN URINE: NEGATIVE
BNP INTERPRETATION: NORMAL
BUN BLDV-MCNC: 13 MG/DL (ref 8–23)
BUN/CREAT BLD: 21 (ref 9–20)
CALCIUM SERPL-MCNC: 9.6 MG/DL (ref 8.6–10.4)
CASTS UA: ABNORMAL /LPF
CHLORIDE BLD-SCNC: 98 MMOL/L (ref 98–107)
CO2: 25 MMOL/L (ref 20–31)
COLOR: YELLOW
COMMENT UA: ABNORMAL
CREAT SERPL-MCNC: 0.63 MG/DL (ref 0.5–0.9)
CRYSTALS, UA: ABNORMAL /HPF
DIFFERENTIAL TYPE: NORMAL
DIRECT EXAM: NORMAL
EKG ATRIAL RATE: 70 BPM
EKG ATRIAL RATE: 76 BPM
EKG P AXIS: 38 DEGREES
EKG P AXIS: 51 DEGREES
EKG P-R INTERVAL: 156 MS
EKG P-R INTERVAL: 172 MS
EKG Q-T INTERVAL: 394 MS
EKG Q-T INTERVAL: 428 MS
EKG QRS DURATION: 74 MS
EKG QRS DURATION: 74 MS
EKG QTC CALCULATION (BAZETT): 443 MS
EKG QTC CALCULATION (BAZETT): 462 MS
EKG R AXIS: -11 DEGREES
EKG R AXIS: -14 DEGREES
EKG T AXIS: 35 DEGREES
EKG T AXIS: 42 DEGREES
EKG VENTRICULAR RATE: 70 BPM
EKG VENTRICULAR RATE: 76 BPM
EOSINOPHILS RELATIVE PERCENT: 2 % (ref 1–4)
EPITHELIAL CELLS UA: ABNORMAL /HPF (ref 0–25)
GFR AFRICAN AMERICAN: >60 ML/MIN
GFR NON-AFRICAN AMERICAN: >60 ML/MIN
GFR SERPL CREATININE-BSD FRML MDRD: ABNORMAL ML/MIN/{1.73_M2}
GFR SERPL CREATININE-BSD FRML MDRD: ABNORMAL ML/MIN/{1.73_M2}
GLUCOSE BLD-MCNC: 92 MG/DL (ref 70–99)
GLUCOSE URINE: NEGATIVE
HCT VFR BLD CALC: 42 % (ref 36.3–47.1)
HEMOGLOBIN: 13.6 G/DL (ref 11.9–15.1)
IMMATURE GRANULOCYTES: 0 %
INR BLD: 2.4 (ref 0.9–1.2)
KETONES, URINE: NEGATIVE
LEUKOCYTE ESTERASE, URINE: NEGATIVE
LYMPHOCYTES # BLD: 31 % (ref 24–43)
Lab: NORMAL
MCH RBC QN AUTO: 28.9 PG (ref 25.2–33.5)
MCHC RBC AUTO-ENTMCNC: 32.4 G/DL (ref 28.4–34.8)
MCV RBC AUTO: 89.4 FL (ref 82.6–102.9)
MONOCYTES # BLD: 8 % (ref 3–12)
MUCUS: ABNORMAL
NITRITE, URINE: NEGATIVE
NRBC AUTOMATED: 0 PER 100 WBC
OTHER OBSERVATIONS UA: ABNORMAL
PDW BLD-RTO: 13.7 % (ref 11.8–14.4)
PH UA: 6 (ref 5–9)
PLATELET # BLD: 229 K/UL (ref 138–453)
PLATELET ESTIMATE: NORMAL
PMV BLD AUTO: 9.4 FL (ref 8.1–13.5)
POTASSIUM SERPL-SCNC: 3.9 MMOL/L (ref 3.7–5.3)
PRO-BNP: 30 PG/ML
PROTEIN UA: NEGATIVE
PROTHROMBIN TIME: 24.2 SEC (ref 9.7–12.2)
RBC # BLD: 4.7 M/UL (ref 3.95–5.11)
RBC # BLD: NORMAL 10*6/UL
RBC UA: ABNORMAL /HPF (ref 0–2)
RENAL EPITHELIAL, UA: ABNORMAL /HPF
SEG NEUTROPHILS: 58 % (ref 36–65)
SEGMENTED NEUTROPHILS ABSOLUTE COUNT: 3.89 K/UL (ref 1.5–8.1)
SODIUM BLD-SCNC: 136 MMOL/L (ref 135–144)
SPECIFIC GRAVITY UA: 1.01 (ref 1.01–1.02)
SPECIMEN DESCRIPTION: NORMAL
TRICHOMONAS: ABNORMAL
TROPONIN INTERP: NORMAL
TROPONIN INTERP: NORMAL
TROPONIN T: <0.03 NG/ML
TROPONIN T: <0.03 NG/ML
TROPONIN, HIGH SENSITIVITY: NORMAL NG/L (ref 0–14)
TROPONIN, HIGH SENSITIVITY: NORMAL NG/L (ref 0–14)
TURBIDITY: CLEAR
URINE HGB: ABNORMAL
UROBILINOGEN, URINE: NORMAL
WBC # BLD: 6.7 K/UL (ref 3.5–11.3)
WBC # BLD: NORMAL 10*3/UL
WBC UA: ABNORMAL /HPF (ref 0–5)
YEAST: ABNORMAL

## 2020-01-14 PROCEDURE — 87804 INFLUENZA ASSAY W/OPTIC: CPT

## 2020-01-14 PROCEDURE — 96376 TX/PRO/DX INJ SAME DRUG ADON: CPT

## 2020-01-14 PROCEDURE — 93005 ELECTROCARDIOGRAM TRACING: CPT | Performed by: EMERGENCY MEDICINE

## 2020-01-14 PROCEDURE — 93010 ELECTROCARDIOGRAM REPORT: CPT | Performed by: INTERNAL MEDICINE

## 2020-01-14 PROCEDURE — 83880 ASSAY OF NATRIURETIC PEPTIDE: CPT

## 2020-01-14 PROCEDURE — 36415 COLL VENOUS BLD VENIPUNCTURE: CPT

## 2020-01-14 PROCEDURE — 6360000002 HC RX W HCPCS: Performed by: EMERGENCY MEDICINE

## 2020-01-14 PROCEDURE — 6370000000 HC RX 637 (ALT 250 FOR IP): Performed by: EMERGENCY MEDICINE

## 2020-01-14 PROCEDURE — 85025 COMPLETE CBC W/AUTO DIFF WBC: CPT

## 2020-01-14 PROCEDURE — 99285 EMERGENCY DEPT VISIT HI MDM: CPT

## 2020-01-14 PROCEDURE — 71045 X-RAY EXAM CHEST 1 VIEW: CPT

## 2020-01-14 PROCEDURE — 84484 ASSAY OF TROPONIN QUANT: CPT

## 2020-01-14 PROCEDURE — 96374 THER/PROPH/DIAG INJ IV PUSH: CPT

## 2020-01-14 PROCEDURE — 80048 BASIC METABOLIC PNL TOTAL CA: CPT

## 2020-01-14 PROCEDURE — 81001 URINALYSIS AUTO W/SCOPE: CPT

## 2020-01-14 PROCEDURE — 96375 TX/PRO/DX INJ NEW DRUG ADDON: CPT

## 2020-01-14 PROCEDURE — 85610 PROTHROMBIN TIME: CPT

## 2020-01-14 RX ORDER — NITROGLYCERIN 0.4 MG/1
0.4 TABLET SUBLINGUAL EVERY 5 MIN PRN
COMMUNITY

## 2020-01-14 RX ORDER — ASPIRIN 81 MG/1
324 TABLET, CHEWABLE ORAL ONCE
Status: COMPLETED | OUTPATIENT
Start: 2020-01-14 | End: 2020-01-14

## 2020-01-14 RX ORDER — ONDANSETRON 2 MG/ML
4 INJECTION INTRAMUSCULAR; INTRAVENOUS ONCE
Status: COMPLETED | OUTPATIENT
Start: 2020-01-14 | End: 2020-01-14

## 2020-01-14 RX ORDER — FENTANYL CITRATE 50 UG/ML
50 INJECTION, SOLUTION INTRAMUSCULAR; INTRAVENOUS ONCE
Status: COMPLETED | OUTPATIENT
Start: 2020-01-14 | End: 2020-01-14

## 2020-01-14 RX ORDER — FENTANYL CITRATE 50 UG/ML
25 INJECTION, SOLUTION INTRAMUSCULAR; INTRAVENOUS ONCE
Status: COMPLETED | OUTPATIENT
Start: 2020-01-14 | End: 2020-01-14

## 2020-01-14 RX ADMIN — FENTANYL CITRATE 50 MCG: 50 INJECTION INTRAMUSCULAR; INTRAVENOUS at 22:01

## 2020-01-14 RX ADMIN — ONDANSETRON 4 MG: 2 INJECTION INTRAMUSCULAR; INTRAVENOUS at 22:01

## 2020-01-14 RX ADMIN — FENTANYL CITRATE 25 MCG: 50 INJECTION INTRAMUSCULAR; INTRAVENOUS at 12:26

## 2020-01-14 RX ADMIN — ASPIRIN 81 MG 324 MG: 81 TABLET ORAL at 12:25

## 2020-01-14 RX ADMIN — FENTANYL CITRATE 50 MCG: 50 INJECTION INTRAMUSCULAR; INTRAVENOUS at 14:45

## 2020-01-14 ASSESSMENT — ENCOUNTER SYMPTOMS
WHEEZING: 0
COUGH: 0
ABDOMINAL PAIN: 0
ABDOMINAL DISTENTION: 0
NAUSEA: 1
RHINORRHEA: 0
CONSTIPATION: 0
DIARRHEA: 0
SHORTNESS OF BREATH: 1
SORE THROAT: 0
VOMITING: 0

## 2020-01-14 ASSESSMENT — PAIN SCALES - GENERAL
PAINLEVEL_OUTOF10: 4
PAINLEVEL_OUTOF10: 2
PAINLEVEL_OUTOF10: 10
PAINLEVEL_OUTOF10: 4
PAINLEVEL_OUTOF10: 7
PAINLEVEL_OUTOF10: 4
PAINLEVEL_OUTOF10: 10

## 2020-01-14 ASSESSMENT — PAIN DESCRIPTION - PAIN TYPE
TYPE: ACUTE PAIN
TYPE: ACUTE PAIN

## 2020-01-14 ASSESSMENT — HEART SCORE: ECG: 0

## 2020-01-14 ASSESSMENT — PAIN DESCRIPTION - DESCRIPTORS: DESCRIPTORS: HEAVINESS

## 2020-01-14 ASSESSMENT — PAIN DESCRIPTION - LOCATION
LOCATION: CHEST
LOCATION: BACK;CHEST
LOCATION: CHEST

## 2020-01-14 ASSESSMENT — PAIN DESCRIPTION - ORIENTATION: ORIENTATION: ANTERIOR;MID

## 2020-01-14 NOTE — ED NOTES
Patients'  made aware that we are still waiting on a bed assignment at [facility].      Beauty Session, RN  01/14/20 [id number]

## 2020-01-14 NOTE — ED PROVIDER NOTES
[facility] ED  Emergency Department        Pt Name: Kelly Nolasco  MRN: [id number]  [place] 1950  Date of evaluation: 1/14/20    CHIEF COMPLAINT       Chief Complaint   Patient presents with    Chest Pain     pt states mid anterior chest pain that radiates into her left shoulder. Onset yesterday. HISTORY OF PRESENT ILLNESS  (Location/Symptom, Timing/Onset, Context/Setting, Quality, Duration, ModifyingFactors, Severity.)      Kelly Nolasco is a 81 y.o. female who presents with chest pain that got worse around 3 AM this morning. Patient reports symptoms worse with deep inspiration, and reports symptoms previously where she was transferred and had stress testing down at [facility]. She reports now symptoms are worse. She reports increased fatigue with exertions, chest pain does not worsen with exertion. She also reports SOB, chest pain is constant, radiating left shoulder. sHe also reports presyncope symptoms. She is on coumadin, has h/o DVT      PAST MEDICAL / SURGICAL / SOCIAL / FAMILY HISTORY      has a past medical history of Anxiety, Asthma, Chronic back pain, Depression, Difficult intravenous access, Diverticulitis, DVT (deep venous thrombosis) ([facility] [address] Dyslexia, GERD (gastroesophageal reflux disease), Headache(784.0), Hx of blood clots, Hyperlipidemia, Hypertension, Migraine, Mitral valve prolapse, Sleep apnea, Wears dentures, and Wears glasses. has a past surgical history that includes Sigmoidoscopy (6/20/2012); Spine surgery (2005); Tonsillectomy (2002); Varicose vein surgery (Bilateral); cervical fusion (2013); sinus surgery; Upper gastrointestinal endoscopy (8/14/2013); Hysterectomy (1986); Colonoscopy (7/16/2012); back surgery; and back surgery (10/17/2016).        Social History     Socioeconomic History    Marital status:      Spouse name: Not on file    Number of children: Not on file    Years of education: Not on file    Highest education level: Not on file therapeutic low concern for PE, she denies any leg pain or leg cramping or leg swelling. She is on Coumadin due to hypercoagulable state. Her EKG does not show any concerning symptoms. Her EKG is unchanged from September of 2019. Check additional cardiac labs, given her age, and history she does have a heart score of 4. And given that her symptoms have been worsening. I do feel that she is higher risk and warrants possibly more than a stress test versus cardiac rule out with troponins and monitoring. Aspirin was given    PLAN (LABS / IMAGING / EKG):  Orders Placed This Encounter   Procedures    Rapid influenza A/B antigens    XR CHEST PORTABLE    Basic Metabolic Panel    CBC Auto Differential    Troponin    Protime-INR    Urinalysis    Brain Natriuretic Peptide    Microscopic Urinalysis    Troponin    Telemetry monitoring    Vital signs    EKG 12 Lead    EKG 12 Lead    Insert peripheral IV       MEDICATIONS ORDERED:  Orders Placed This Encounter   Medications    fentaNYL (SUBLIMAZE) injection 25 mcg    aspirin chewable tablet 324 mg    fentaNYL (SUBLIMAZE) injection 50 mcg       DIAGNOSTIC RESULTS / EMERGENCY DEPARTMENT COURSE / MDM     LABS:  Results for orders placed or performed during the hospital encounter of 01/14/20   Rapid influenza A/B antigens   Result Value Ref Range    Specimen Description . NASOPHARYNGEAL SWAB     Special Requests NOT REPORTED     Direct Exam       Presumptive negative for the presence of Influenza A and Influenza B antigen.   PCR confirmation of negative results is recommended, since the antigen present in the specimen may be below the detection limit of the test.   Basic Metabolic Panel   Result Value Ref Range    Glucose 92 70 - 99 mg/dL    BUN 13 8 - 23 mg/dL    CREATININE 0.63 0.50 - 0.90 mg/dL    Bun/Cre Ratio 21 (H) 9 - 20    Calcium 9.6 8.6 - 10.4 mg/dL    Sodium 136 135 - 144 mmol/L    Potassium 3.9 3.7 - 5.3 mmol/L    Chloride 98 98 - 107 mmol/L    CO2 25 20 - 31 mmol/L    Anion Gap 13 9 - 17 mmol/L    GFR Non-African American >60 >60 mL/min    GFR African American >60 >60 mL/min    GFR Comment          GFR Staging         CBC Auto Differential   Result Value Ref Range    WBC 6.7 3.5 - 11.3 k/uL    RBC 4.70 3.95 - 5.11 m/uL    Hemoglobin 13.6 11.9 - 15.1 g/dL    Hematocrit 42.0 36.3 - 47.1 %    MCV 89.4 82.6 - 102.9 fL    MCH 28.9 25.2 - 33.5 pg    MCHC 32.4 28.4 - 34.8 g/dL    RDW 13.7 11.8 - 14.4 %    Platelets 550 193 - 156 k/uL    MPV 9.4 8.1 - 13.5 fL    NRBC Automated 0.0 0.0 per 100 WBC    Differential Type NOT REPORTED     Seg Neutrophils 58 36 - 65 %    Lymphocytes 31 24 - 43 %    Monocytes 8 3 - 12 %    Eosinophils % 2 1 - 4 %    Basophils 1 0 - 2 %    Immature Granulocytes 0 0 %    Segs Absolute 3.89 1.50 - 8.10 k/uL    Absolute Lymph # 2.06 1.10 - 3.70 k/uL    Absolute Mono # 0.53 0.10 - 1.20 k/uL    Absolute Eos # 0.16 0.00 - 0.44 k/uL    Basophils Absolute 0.04 0.00 - 0.20 k/uL    Absolute Immature Granulocyte 0.03 0.00 - 0.30 k/uL    WBC Morphology NOT REPORTED     RBC Morphology NOT REPORTED     Platelet Estimate NOT REPORTED    Troponin   Result Value Ref Range    Troponin, High Sensitivity NOT REPORTED 0 - 14 ng/L    Troponin T <0.03 <0.03 ng/mL    Troponin Interp         Protime-INR   Result Value Ref Range    Protime 24.2 (H) 9.7 - 12.2 sec    INR 2.4 (H) 0.9 - 1.2   Urinalysis   Result Value Ref Range    Color, UA YELLOW YELLOW    Turbidity UA CLEAR CLEAR    Glucose, Ur NEGATIVE NEGATIVE    Bilirubin Urine NEGATIVE NEGATIVE    Ketones, Urine NEGATIVE NEGATIVE    Specific [place], UA 1.010 1.010 - 1.020    Urine Hgb TRACE (A) NEGATIVE    pH, UA 6.0 5.0 - 9.0    Protein, UA NEGATIVE NEGATIVE    Urobilinogen, Urine Normal Normal    Nitrite, Urine NEGATIVE NEGATIVE    Leukocyte Esterase, Urine NEGATIVE NEGATIVE    Urinalysis Comments NOT REPORTED    Brain Natriuretic Peptide   Result Value Ref Range    Pro-BNP 30 <300 pg/mL    BNP Interpretation years  *Risk factors for Atherosclerotic disease: Hypercholesterolemia, Hypertension  Risk Factors: 1 or 2 risk factors  Troponin: < 1X normal limit  Heart Score Total: 4    Score 0-3: 2.5% MACE over next 6 weeks = Discharge Home  Score 4-6: 20.3% MACE over next 6 weeks = Admit for Clinical Observation  Score 7-10: 72.7% MACE over next 6 weeks = Early Invasive Strategies         4:04 PM  Spoke with yasir and patient accepted under dr. Marie Mcdonald      1. Chest pain, unspecified type          DISPOSITION / PLAN     DISPOSITION Decision To Transfer 01/14/2020 03:24:50 PM      PATIENT REFERRED TO:  No follow-up provider specified.     DISCHARGE MEDICATIONS:  New Prescriptions    No medications on file       Bette Chandler  3:25 PM    Attending Emergency Physician  [facility] ED    (Please note that portions of this note were completed with a voice recognition program.  Effortswere made to edit the dictations but occasionally words are mis-transcribed.)              Mary Medina, DO  01/14/20 [address], DO  01/14/20 [address], DO  01/14/20 1844

## 2020-01-15 ENCOUNTER — HOSPITAL ENCOUNTER (INPATIENT)
Age: 70
LOS: 2 days | Discharge: HOME HEALTH | DRG: 313 | End: 2020-01-17
Attending: INTERNAL MEDICINE | Admitting: INTERNAL MEDICINE
Payer: MEDICARE

## 2020-01-15 ENCOUNTER — APPOINTMENT (OUTPATIENT)
Dept: GENERAL RADIOLOGY | Age: 70
DRG: 313 | End: 2020-01-15
Attending: INTERNAL MEDICINE
Payer: MEDICARE

## 2020-01-15 VITALS
HEART RATE: 71 BPM | BODY MASS INDEX: 30.9 KG/M2 | RESPIRATION RATE: 15 BRPM | SYSTOLIC BLOOD PRESSURE: 117 MMHG | OXYGEN SATURATION: 93 % | DIASTOLIC BLOOD PRESSURE: 66 MMHG | TEMPERATURE: 97.4 F | WEIGHT: 180 LBS

## 2020-01-15 LAB
BNP INTERPRETATION: NORMAL
EKG ATRIAL RATE: 67 BPM
EKG P AXIS: 47 DEGREES
EKG P-R INTERVAL: 178 MS
EKG Q-T INTERVAL: 424 MS
EKG QRS DURATION: 72 MS
EKG QTC CALCULATION (BAZETT): 448 MS
EKG R AXIS: -4 DEGREES
EKG T AXIS: 26 DEGREES
EKG VENTRICULAR RATE: 67 BPM
INR BLD: 1.7
PRO-BNP: 22 PG/ML
PROTHROMBIN TIME: 17.5 SEC (ref 9–12)
TROPONIN INTERP: NORMAL
TROPONIN INTERP: NORMAL
TROPONIN T: <0.03 NG/ML
TROPONIN T: NORMAL NG/ML
TROPONIN, HIGH SENSITIVITY: <6 NG/L (ref 0–14)
TROPONIN, HIGH SENSITIVITY: NORMAL NG/L (ref 0–14)

## 2020-01-15 PROCEDURE — 6360000002 HC RX W HCPCS: Performed by: NURSE PRACTITIONER

## 2020-01-15 PROCEDURE — 93010 ELECTROCARDIOGRAM REPORT: CPT | Performed by: INTERNAL MEDICINE

## 2020-01-15 PROCEDURE — G0378 HOSPITAL OBSERVATION PER HR: HCPCS

## 2020-01-15 PROCEDURE — 93005 ELECTROCARDIOGRAM TRACING: CPT | Performed by: EMERGENCY MEDICINE

## 2020-01-15 PROCEDURE — 96372 THER/PROPH/DIAG INJ SC/IM: CPT

## 2020-01-15 PROCEDURE — 2580000003 HC RX 258: Performed by: NURSE PRACTITIONER

## 2020-01-15 PROCEDURE — 6360000002 HC RX W HCPCS: Performed by: EMERGENCY MEDICINE

## 2020-01-15 PROCEDURE — 36415 COLL VENOUS BLD VENIPUNCTURE: CPT

## 2020-01-15 PROCEDURE — 6370000000 HC RX 637 (ALT 250 FOR IP): Performed by: EMERGENCY MEDICINE

## 2020-01-15 PROCEDURE — 96376 TX/PRO/DX INJ SAME DRUG ADON: CPT

## 2020-01-15 PROCEDURE — 85610 PROTHROMBIN TIME: CPT

## 2020-01-15 PROCEDURE — 71045 X-RAY EXAM CHEST 1 VIEW: CPT

## 2020-01-15 PROCEDURE — 83880 ASSAY OF NATRIURETIC PEPTIDE: CPT

## 2020-01-15 PROCEDURE — 6370000000 HC RX 637 (ALT 250 FOR IP): Performed by: INTERNAL MEDICINE

## 2020-01-15 PROCEDURE — G0379 DIRECT REFER HOSPITAL OBSERV: HCPCS

## 2020-01-15 PROCEDURE — 96375 TX/PRO/DX INJ NEW DRUG ADDON: CPT

## 2020-01-15 PROCEDURE — 84484 ASSAY OF TROPONIN QUANT: CPT

## 2020-01-15 PROCEDURE — 6370000000 HC RX 637 (ALT 250 FOR IP): Performed by: NURSE PRACTITIONER

## 2020-01-15 PROCEDURE — 2060000000 HC ICU INTERMEDIATE R&B

## 2020-01-15 RX ORDER — SODIUM CHLORIDE 0.9 % (FLUSH) 0.9 %
10 SYRINGE (ML) INJECTION EVERY 12 HOURS SCHEDULED
Status: DISCONTINUED | OUTPATIENT
Start: 2020-01-15 | End: 2020-01-17 | Stop reason: HOSPADM

## 2020-01-15 RX ORDER — POTASSIUM CHLORIDE 20 MEQ/1
40 TABLET, EXTENDED RELEASE ORAL PRN
Status: DISCONTINUED | OUTPATIENT
Start: 2020-01-15 | End: 2020-01-17 | Stop reason: HOSPADM

## 2020-01-15 RX ORDER — PANTOPRAZOLE SODIUM 40 MG/1
40 TABLET, DELAYED RELEASE ORAL ONCE
Status: COMPLETED | OUTPATIENT
Start: 2020-01-15 | End: 2020-01-15

## 2020-01-15 RX ORDER — LORAZEPAM 2 MG/ML
0.5 INJECTION INTRAMUSCULAR ONCE
Status: COMPLETED | OUTPATIENT
Start: 2020-01-15 | End: 2020-01-15

## 2020-01-15 RX ORDER — ONDANSETRON 2 MG/ML
4 INJECTION INTRAMUSCULAR; INTRAVENOUS EVERY 6 HOURS PRN
Status: DISCONTINUED | OUTPATIENT
Start: 2020-01-15 | End: 2020-01-17 | Stop reason: HOSPADM

## 2020-01-15 RX ORDER — WARFARIN SODIUM 3 MG/1
3 TABLET ORAL DAILY
Status: DISCONTINUED | OUTPATIENT
Start: 2020-01-15 | End: 2020-01-15

## 2020-01-15 RX ORDER — SODIUM CHLORIDE 0.9 % (FLUSH) 0.9 %
10 SYRINGE (ML) INJECTION PRN
Status: DISCONTINUED | OUTPATIENT
Start: 2020-01-15 | End: 2020-01-17 | Stop reason: HOSPADM

## 2020-01-15 RX ORDER — WARFARIN SODIUM 2.5 MG/1
3.75 TABLET ORAL DAILY
Status: DISCONTINUED | OUTPATIENT
Start: 2020-01-15 | End: 2020-01-15

## 2020-01-15 RX ORDER — FAMOTIDINE 20 MG/1
20 TABLET, FILM COATED ORAL DAILY
Status: DISCONTINUED | OUTPATIENT
Start: 2020-01-15 | End: 2020-01-17 | Stop reason: HOSPADM

## 2020-01-15 RX ORDER — LORAZEPAM 1 MG/1
1 TABLET ORAL 3 TIMES DAILY PRN
Status: DISCONTINUED | OUTPATIENT
Start: 2020-01-15 | End: 2020-01-17 | Stop reason: HOSPADM

## 2020-01-15 RX ORDER — FENTANYL CITRATE 50 UG/ML
25 INJECTION, SOLUTION INTRAMUSCULAR; INTRAVENOUS ONCE
Status: COMPLETED | OUTPATIENT
Start: 2020-01-15 | End: 2020-01-15

## 2020-01-15 RX ORDER — OXYCODONE HYDROCHLORIDE AND ACETAMINOPHEN 5; 325 MG/1; MG/1
1 TABLET ORAL EVERY 8 HOURS PRN
Status: DISCONTINUED | OUTPATIENT
Start: 2020-01-15 | End: 2020-01-17 | Stop reason: HOSPADM

## 2020-01-15 RX ORDER — ATORVASTATIN CALCIUM 20 MG/1
20 TABLET, FILM COATED ORAL NIGHTLY
Status: DISCONTINUED | OUTPATIENT
Start: 2020-01-15 | End: 2020-01-17 | Stop reason: HOSPADM

## 2020-01-15 RX ORDER — POTASSIUM CHLORIDE 7.45 MG/ML
10 INJECTION INTRAVENOUS PRN
Status: DISCONTINUED | OUTPATIENT
Start: 2020-01-15 | End: 2020-01-17 | Stop reason: HOSPADM

## 2020-01-15 RX ORDER — CARVEDILOL 3.12 MG/1
3.12 TABLET ORAL 2 TIMES DAILY WITH MEALS
Status: DISCONTINUED | OUTPATIENT
Start: 2020-01-15 | End: 2020-01-17 | Stop reason: HOSPADM

## 2020-01-15 RX ORDER — LISINOPRIL 5 MG/1
5 TABLET ORAL DAILY
Status: DISCONTINUED | OUTPATIENT
Start: 2020-01-15 | End: 2020-01-16

## 2020-01-15 RX ORDER — HYDROCODONE BITARTRATE AND ACETAMINOPHEN 5; 325 MG/1; MG/1
1 TABLET ORAL ONCE
Status: COMPLETED | OUTPATIENT
Start: 2020-01-15 | End: 2020-01-15

## 2020-01-15 RX ORDER — MAGNESIUM SULFATE 1 G/100ML
1 INJECTION INTRAVENOUS PRN
Status: DISCONTINUED | OUTPATIENT
Start: 2020-01-15 | End: 2020-01-17 | Stop reason: HOSPADM

## 2020-01-15 RX ORDER — NITROGLYCERIN 0.4 MG/1
0.4 TABLET SUBLINGUAL EVERY 5 MIN PRN
Status: DISCONTINUED | OUTPATIENT
Start: 2020-01-15 | End: 2020-01-17 | Stop reason: HOSPADM

## 2020-01-15 RX ORDER — CHOLECALCIFEROL (VITAMIN D3) 125 MCG
1000 CAPSULE ORAL DAILY
Status: DISCONTINUED | OUTPATIENT
Start: 2020-01-15 | End: 2020-01-17 | Stop reason: HOSPADM

## 2020-01-15 RX ADMIN — PANTOPRAZOLE SODIUM 40 MG: 40 TABLET, DELAYED RELEASE ORAL at 16:03

## 2020-01-15 RX ADMIN — ASPIRIN 325 MG: 325 TABLET, COATED ORAL at 21:59

## 2020-01-15 RX ADMIN — ENOXAPARIN SODIUM 80 MG: 80 INJECTION SUBCUTANEOUS at 22:00

## 2020-01-15 RX ADMIN — LORAZEPAM 1 MG: 1 TABLET ORAL at 21:59

## 2020-01-15 RX ADMIN — Medication 10 ML: at 22:00

## 2020-01-15 RX ADMIN — FENTANYL CITRATE 25 MCG: 50 INJECTION INTRAMUSCULAR; INTRAVENOUS at 08:30

## 2020-01-15 RX ADMIN — ATORVASTATIN CALCIUM 20 MG: 20 TABLET, FILM COATED ORAL at 22:00

## 2020-01-15 RX ADMIN — Medication 1 TABLET: at 22:04

## 2020-01-15 RX ADMIN — Medication 1000 MCG: at 18:46

## 2020-01-15 RX ADMIN — OXYCODONE HYDROCHLORIDE AND ACETAMINOPHEN 1 TABLET: 5; 325 TABLET ORAL at 22:00

## 2020-01-15 RX ADMIN — LORAZEPAM 0.5 MG: 2 INJECTION INTRAMUSCULAR; INTRAVENOUS at 01:28

## 2020-01-15 RX ADMIN — HYDROCODONE BITARTRATE AND ACETAMINOPHEN 1 TABLET: 5; 325 TABLET ORAL at 13:59

## 2020-01-15 RX ADMIN — CARVEDILOL 3.12 MG: 3.12 TABLET, FILM COATED ORAL at 21:59

## 2020-01-15 RX ADMIN — LISINOPRIL 5 MG: 5 TABLET ORAL at 21:56

## 2020-01-15 ASSESSMENT — PAIN SCALES - GENERAL
PAINLEVEL_OUTOF10: 0
PAINLEVEL_OUTOF10: 4
PAINLEVEL_OUTOF10: 5
PAINLEVEL_OUTOF10: 6
PAINLEVEL_OUTOF10: 4

## 2020-01-15 ASSESSMENT — PAIN DESCRIPTION - PAIN TYPE: TYPE: ACUTE PAIN

## 2020-01-15 ASSESSMENT — PAIN DESCRIPTION - ORIENTATION: ORIENTATION: ANTERIOR

## 2020-01-15 ASSESSMENT — PAIN DESCRIPTION - LOCATION: LOCATION: BACK

## 2020-01-15 NOTE — ED NOTES
Pt ambulated to the bathroom and back to her room     [facility]  01/15/20 2004

## 2020-01-15 NOTE — ED NOTES
[address] for update on bed assignment. No bed available at this time.      Oren ROBERT Reser  01/15/20 5199

## 2020-01-15 NOTE — ED NOTES
Pt up to the restroom at this time via wheelchair. Pt requesting pain medication at this time also.        Marbella Smith RN  01/14/20 0023

## 2020-01-15 NOTE — ED NOTES
Contacted [facility] requesting update on bed assignment. Still no bed available and no available time frame.      Oren ROBERT Reser  01/15/20 5760

## 2020-01-15 NOTE — ED NOTES
Patient eating lunch at this time. Updated on POC. Denies any other needs at this time.      Troy Villasenor RN  01/15/20 8767

## 2020-01-15 NOTE — ED NOTES
Called [facility] and still no bed assignment at this time      Pernell Marie  01/14/20 1954

## 2020-01-15 NOTE — PLAN OF CARE
Problem: Falls - Risk of:  Goal: Will remain free from falls  Description  Will remain free from falls  Outcome: Ongoing  Goal: Absence of physical injury  Description  Absence of physical injury  Outcome: Ongoing     Problem: Cardiac:  Goal: Ability to maintain vital signs within normal range will improve  Description  Ability to maintain vital signs within normal range will improve  Outcome: Ongoing  Goal: Cardiovascular alteration will improve  Description  Cardiovascular alteration will improve  Outcome: Ongoing     Problem: Health Behavior:  Goal: Will modify at least one risk factor affecting health status  Description  Will modify at least one risk factor affecting health status  Outcome: Ongoing  Goal: Identification of resources available to assist in meeting health care needs will improve  Description  Identification of resources available to assist in meeting health care needs will improve  Outcome: Ongoing   Electronically signed by Luciana Tolentino RN on 1/15/2020 at 6:34 PM

## 2020-01-16 LAB
ALBUMIN SERPL-MCNC: 3.8 G/DL (ref 3.5–5.2)
ALBUMIN/GLOBULIN RATIO: 1.5 (ref 1–2.5)
ALP BLD-CCNC: 88 U/L (ref 35–104)
ALT SERPL-CCNC: 16 U/L (ref 5–33)
ANION GAP SERPL CALCULATED.3IONS-SCNC: 13 MMOL/L (ref 9–17)
ANION GAP SERPL CALCULATED.3IONS-SCNC: 15 MMOL/L (ref 9–17)
AST SERPL-CCNC: 21 U/L
BILIRUB SERPL-MCNC: 0.4 MG/DL (ref 0.3–1.2)
BUN BLDV-MCNC: 11 MG/DL (ref 8–23)
BUN BLDV-MCNC: 14 MG/DL (ref 8–23)
BUN/CREAT BLD: ABNORMAL (ref 9–20)
BUN/CREAT BLD: NORMAL (ref 9–20)
CALCIUM SERPL-MCNC: 9 MG/DL (ref 8.6–10.4)
CALCIUM SERPL-MCNC: 9.5 MG/DL (ref 8.6–10.4)
CHLORIDE BLD-SCNC: 100 MMOL/L (ref 98–107)
CHLORIDE BLD-SCNC: 100 MMOL/L (ref 98–107)
CHOLESTEROL/HDL RATIO: 5
CHOLESTEROL: 191 MG/DL
CO2: 22 MMOL/L (ref 20–31)
CO2: 23 MMOL/L (ref 20–31)
CREAT SERPL-MCNC: 0.65 MG/DL (ref 0.5–0.9)
CREAT SERPL-MCNC: 0.81 MG/DL (ref 0.5–0.9)
GFR AFRICAN AMERICAN: >60 ML/MIN
GFR AFRICAN AMERICAN: >60 ML/MIN
GFR NON-AFRICAN AMERICAN: >60 ML/MIN
GFR NON-AFRICAN AMERICAN: >60 ML/MIN
GFR SERPL CREATININE-BSD FRML MDRD: ABNORMAL ML/MIN/{1.73_M2}
GFR SERPL CREATININE-BSD FRML MDRD: ABNORMAL ML/MIN/{1.73_M2}
GFR SERPL CREATININE-BSD FRML MDRD: NORMAL ML/MIN/{1.73_M2}
GFR SERPL CREATININE-BSD FRML MDRD: NORMAL ML/MIN/{1.73_M2}
GLUCOSE BLD-MCNC: 101 MG/DL (ref 70–99)
GLUCOSE BLD-MCNC: 94 MG/DL (ref 70–99)
HCT VFR BLD CALC: 42.2 % (ref 36.3–47.1)
HCT VFR BLD CALC: 42.9 % (ref 36.3–47.1)
HDLC SERPL-MCNC: 38 MG/DL
HEMOGLOBIN: 13.3 G/DL (ref 11.9–15.1)
HEMOGLOBIN: 13.6 G/DL (ref 11.9–15.1)
INR BLD: 1.6
LDL CHOLESTEROL: 110 MG/DL (ref 0–130)
MAGNESIUM: 2 MG/DL (ref 1.6–2.6)
MAGNESIUM: 2.1 MG/DL (ref 1.6–2.6)
MCH RBC QN AUTO: 28.8 PG (ref 25.2–33.5)
MCH RBC QN AUTO: 29 PG (ref 25.2–33.5)
MCHC RBC AUTO-ENTMCNC: 31.5 G/DL (ref 28.4–34.8)
MCHC RBC AUTO-ENTMCNC: 31.7 G/DL (ref 28.4–34.8)
MCV RBC AUTO: 91.3 FL (ref 82.6–102.9)
MCV RBC AUTO: 91.5 FL (ref 82.6–102.9)
NRBC AUTOMATED: 0 PER 100 WBC
NRBC AUTOMATED: 0 PER 100 WBC
PDW BLD-RTO: 13.8 % (ref 11.8–14.4)
PDW BLD-RTO: 14 % (ref 11.8–14.4)
PLATELET # BLD: 216 K/UL (ref 138–453)
PLATELET # BLD: 236 K/UL (ref 138–453)
PMV BLD AUTO: 9.5 FL (ref 8.1–13.5)
PMV BLD AUTO: 9.8 FL (ref 8.1–13.5)
POTASSIUM SERPL-SCNC: 3.9 MMOL/L (ref 3.7–5.3)
POTASSIUM SERPL-SCNC: 3.9 MMOL/L (ref 3.7–5.3)
PROTHROMBIN TIME: 16.1 SEC (ref 9–12)
RBC # BLD: 4.62 M/UL (ref 3.95–5.11)
RBC # BLD: 4.69 M/UL (ref 3.95–5.11)
SODIUM BLD-SCNC: 136 MMOL/L (ref 135–144)
SODIUM BLD-SCNC: 137 MMOL/L (ref 135–144)
TOTAL PROTEIN: 6.4 G/DL (ref 6.4–8.3)
TRIGL SERPL-MCNC: 217 MG/DL
TROPONIN INTERP: NORMAL
TROPONIN T: NORMAL NG/ML
TROPONIN, HIGH SENSITIVITY: <6 NG/L (ref 0–14)
VLDLC SERPL CALC-MCNC: ABNORMAL MG/DL (ref 1–30)
WBC # BLD: 6.5 K/UL (ref 3.5–11.3)
WBC # BLD: 6.6 K/UL (ref 3.5–11.3)

## 2020-01-16 PROCEDURE — 80061 LIPID PANEL: CPT

## 2020-01-16 PROCEDURE — 85027 COMPLETE CBC AUTOMATED: CPT

## 2020-01-16 PROCEDURE — 99222 1ST HOSP IP/OBS MODERATE 55: CPT | Performed by: INTERNAL MEDICINE

## 2020-01-16 PROCEDURE — 6370000000 HC RX 637 (ALT 250 FOR IP): Performed by: INTERNAL MEDICINE

## 2020-01-16 PROCEDURE — 36415 COLL VENOUS BLD VENIPUNCTURE: CPT

## 2020-01-16 PROCEDURE — 80053 COMPREHEN METABOLIC PANEL: CPT

## 2020-01-16 PROCEDURE — 6360000002 HC RX W HCPCS: Performed by: NURSE PRACTITIONER

## 2020-01-16 PROCEDURE — 96361 HYDRATE IV INFUSION ADD-ON: CPT

## 2020-01-16 PROCEDURE — 2580000003 HC RX 258: Performed by: STUDENT IN AN ORGANIZED HEALTH CARE EDUCATION/TRAINING PROGRAM

## 2020-01-16 PROCEDURE — 96372 THER/PROPH/DIAG INJ SC/IM: CPT

## 2020-01-16 PROCEDURE — 6370000000 HC RX 637 (ALT 250 FOR IP): Performed by: NURSE PRACTITIONER

## 2020-01-16 PROCEDURE — 85610 PROTHROMBIN TIME: CPT

## 2020-01-16 PROCEDURE — G0378 HOSPITAL OBSERVATION PER HR: HCPCS

## 2020-01-16 PROCEDURE — 2060000000 HC ICU INTERMEDIATE R&B

## 2020-01-16 PROCEDURE — 2580000003 HC RX 258: Performed by: NURSE PRACTITIONER

## 2020-01-16 PROCEDURE — 96360 HYDRATION IV INFUSION INIT: CPT

## 2020-01-16 PROCEDURE — 83735 ASSAY OF MAGNESIUM: CPT

## 2020-01-16 PROCEDURE — 80048 BASIC METABOLIC PNL TOTAL CA: CPT

## 2020-01-16 RX ORDER — UREA 10 %
5 LOTION (ML) TOPICAL NIGHTLY PRN
Status: DISCONTINUED | OUTPATIENT
Start: 2020-01-17 | End: 2020-01-17 | Stop reason: HOSPADM

## 2020-01-16 RX ORDER — UREA 10 %
5 LOTION (ML) TOPICAL ONCE
Status: DISCONTINUED | OUTPATIENT
Start: 2020-01-16 | End: 2020-01-17 | Stop reason: HOSPADM

## 2020-01-16 RX ORDER — WARFARIN SODIUM 3 MG/1
3 TABLET ORAL DAILY
Status: DISCONTINUED | OUTPATIENT
Start: 2020-01-16 | End: 2020-01-17 | Stop reason: DRUGHIGH

## 2020-01-16 RX ORDER — WARFARIN SODIUM 2.5 MG/1
2.5 TABLET ORAL
Status: DISCONTINUED | OUTPATIENT
Start: 2020-01-16 | End: 2020-01-16

## 2020-01-16 RX ORDER — 0.9 % SODIUM CHLORIDE 0.9 %
500 INTRAVENOUS SOLUTION INTRAVENOUS ONCE
Status: COMPLETED | OUTPATIENT
Start: 2020-01-16 | End: 2020-01-16

## 2020-01-16 RX ADMIN — OXYCODONE HYDROCHLORIDE AND ACETAMINOPHEN 1 TABLET: 5; 325 TABLET ORAL at 06:09

## 2020-01-16 RX ADMIN — FAMOTIDINE 20 MG: 20 TABLET, FILM COATED ORAL at 08:26

## 2020-01-16 RX ADMIN — Medication 1 TABLET: at 08:24

## 2020-01-16 RX ADMIN — Medication 10 ML: at 08:30

## 2020-01-16 RX ADMIN — SODIUM CHLORIDE 500 ML: 9 INJECTION, SOLUTION INTRAVENOUS at 11:06

## 2020-01-16 RX ADMIN — Medication 1000 MCG: at 08:24

## 2020-01-16 RX ADMIN — ASPIRIN 325 MG: 325 TABLET, COATED ORAL at 08:25

## 2020-01-16 RX ADMIN — Medication 10 ML: at 20:41

## 2020-01-16 RX ADMIN — Medication 1 TABLET: at 20:41

## 2020-01-16 RX ADMIN — ENOXAPARIN SODIUM 80 MG: 80 INJECTION SUBCUTANEOUS at 09:53

## 2020-01-16 RX ADMIN — ATORVASTATIN CALCIUM 20 MG: 20 TABLET, FILM COATED ORAL at 20:41

## 2020-01-16 RX ADMIN — WARFARIN SODIUM 3 MG: 3 TABLET ORAL at 19:15

## 2020-01-16 ASSESSMENT — PAIN SCALES - GENERAL
PAINLEVEL_OUTOF10: 6
PAINLEVEL_OUTOF10: 0

## 2020-01-16 NOTE — PLAN OF CARE
Problem: Falls - Risk of:  Goal: Will remain free from falls  Description  Will remain free from falls  1/16/2020 1542 by Ann Perkins RN  Outcome: Ongoing  1/16/2020 0703 by Glen Jacobson RN  Outcome: Ongoing  Goal: Absence of physical injury  Description  Absence of physical injury  1/16/2020 1542 by Ann Perkins RN  Outcome: Ongoing  1/16/2020 0703 by Glen Jacobson RN  Outcome: Ongoing     Problem: Cardiac:  Goal: Ability to maintain vital signs within normal range will improve  Description  Ability to maintain vital signs within normal range will improve  1/16/2020 1542 by Ann Perkins RN  Outcome: Ongoing  1/16/2020 0703 by Glen Jacobson RN  Outcome: Ongoing  Goal: Cardiovascular alteration will improve  Description  Cardiovascular alteration will improve  1/16/2020 1542 by Ann Perkins RN  Outcome: Ongoing  1/16/2020 0703 by Glen Jacobson RN  Outcome: Ongoing     Problem: Health Behavior:  Goal: Will modify at least one risk factor affecting health status  Description  Will modify at least one risk factor affecting health status  1/16/2020 1542 by Ann Perkins RN  Outcome: Ongoing  1/16/2020 0703 by Glen Jacobson RN  Outcome: Ongoing  Goal: Identification of resources available to assist in meeting health care needs will improve  Description  Identification of resources available to assist in meeting health care needs will improve  1/16/2020 1542 by Ann Perkins RN  Outcome: Ongoing  1/16/2020 0703 by Glen Jacobson RN  Outcome: Ongoing

## 2020-01-16 NOTE — CONSULTS
[facility]    Inpatient Consultation Note               Today's Date: 1/16/2020  Patient Name: Beth Fuchs  Date of admission: 1/15/2020  5:24 PM  Patient's age: 78 y. o., 1950  Admission Dx: Chest pain [R07.9]    Reason for  Consult:  Chest pain    Requesting Physician: Ria Moy MD    CHIEF COMPLAINT:     No chief complaint on file. History Obtained From:  patient, electronic medical record    HISTORY OF PRESENT ILLNESS:      The patient is a 78 y.o.  female who is admitted to the hospital for chest pain. She has history of essential hypertension. She presented with chest pain that start 2 days ago. The pain vary in intensity. The pain radiates her back and left shoulder. Aggravated by deep breathing. No relieving factors. She does report diaphoresis and shortness of breath. She does have palpitations but no dizziness or syncope. No nausea or vomiting. EKG NSR. Troponin negative. History of DVT 3 years ago on coumadin. INR is subtherapeutic. Past Medical History:   has a past medical history of Anxiety, Arthritis, Asthma, Blood circulation, collateral, CAD (coronary artery disease), Chronic back pain, Depression, Difficult intravenous access, Disease of blood and blood forming organ, Diverticulitis, DVT (deep venous thrombosis) ([address] Dyslexia, GERD (gastroesophageal reflux disease), Headache(784.0), Hx of blood clots, Hyperlipidemia, Hypertension, Migraine, Mitral valve prolapse, Movement disorder, Neuromuscular disorder ([address] Sleep apnea, Wears dentures, and Wears glasses. Past Surgical History:   has a past surgical history that includes Sigmoidoscopy (6/20/2012); Spine surgery (2005); Tonsillectomy (2002); Varicose vein surgery (Bilateral); cervical fusion (2013); sinus surgery; Upper gastrointestinal endoscopy (8/14/2013); Hysterectomy (1986); Colonoscopy (7/16/2012); back surgery; and back surgery (10/17/2016).      Home Medications:    Prior to cough  Gastrointestinal: No abdominal pain. No change in bowel or bladder habits. Genitourinary: No dysuria, trouble voiding, or hematuria. Musculoskeletal: No joint complaints. Neurological: No headache  Hematologic/Lymphatic: No abnormal bruising or bleeding      PHYSICAL EXAM:      BP (!) 121/58   Pulse 61   Temp 97.5 °F (36.4 °C) (Oral)   Resp 16   Ht 5' 4\" (1.626 m)   Wt 180 lb 12.8 oz (82 kg)   LMP 10/05/1986 (Approximate)   SpO2 95%   BMI 31.03 kg/m²    No intake or output data in the 24 hours ending 01/16/20 0848      Constitutional and General Appearance: Alert, cooperative, no distress and appears stated age  HEENT: PERRL, no cervical lymphadenopathy. No masses palpable. Normal oral mucosa  Respiratory:  Normal excursion and expansion without use of accessory muscles  Resp Auscultation: Good respiratory effort. No for increased work of breathing. On auscultation: clear to auscultation bilaterally  Cardiovascular: The apical impulse is not displaced  Heart tones are crisp and normal. regular S1 and S2. No murmurs   Jugular venous pulsation Normal  Peripheral pulses are symmetrical and full   Abdomen:   No masses or tenderness  Bowel sounds present  Extremities:   No Cyanosis or Clubbing   Lower extremity edema: No   Skin: Warm and dry  Neurological:  Alert and oriented. Moves all extremities well    DATA:    Diagnostics:    EKG: NSR .    STRESS 9/3/19: No ischemia/infarct. EF 68%.      CATH 9/1/17: Normal coronaries. EF 60%.      HOLTER 4/11/17: NSR with SA. Occasional supraventricular beats. Rare PVCs.      ECHO 4/10/17: EF 60%, mild TR.    Labs:     CBC:   Recent Labs     01/16/20  0130 01/16/20  0544   WBC 6.5 6.6   HGB 13.3 13.6   HCT 42.2 42.9    216     BMP:   Recent Labs     01/16/20  0130 01/16/20  0544    137   K 3.9 3.9   CO2 23 22   BUN 11 14   CREATININE 0.65 0.81   LABGLOM >60 >60   GLUCOSE 101* 94     Pro-BNP:    Recent Labs     01/14/20  1139 01/15/20  2015   PROBNP 30 22     BNP: No results for input(s): BNP in the last 72 hours. PT/INR:   Recent Labs     01/15/20  2015 01/16/20  0544   PROTIME 17.5* 16.1*   INR 1.7 1.6     APTT:No results for input(s): APTT in the last 72 hours. CARDIAC ENZYMES:No results for input(s): CKTOTAL, CKMB, CKMBINDEX, TROPONINI in the last 72 hours. Invalid input(s):  [address]  Recent Labs     01/15/20  0827 01/15/20  2015 01/15/20  2339   TROPONINT <0.03 NOT REPORTED NOT REPORTED       FASTING LIPID PANEL:  Lab Results   Component Value Date    HDL 38 01/16/2020    TRIG 217 01/16/2020     LIVER PROFILE:  Recent Labs     01/16/20  0544   AST 21   ALT 16   LABALBU 3.8         Patient's Active Problem List  Active Problems:    Chest pain  Resolved Problems:    * No resolved hospital problems. *        IMPRESSION:      Chest pain  Essential hypertension  History of DVT on coumadin. Subtherapeutic INR  Palpitations. Patient had Holter monitor that shower Rare PVCs      RECOMMENDATIONS:  Will check CT chest to rule out PE. Further recommendations to follow. Further recommendations to follow. Thank you for allowing us to participate in the care of Priscilla Willoughby. If you have any questions or concerns, please do not hesitate to contact us. Discussed with patient and Nurse. Casey Guerrero M.D. Fellow, [id number] [facility]        Please note that part of this chart were generated using voice recognition  dictation software. Although every effort was made to ensure the accuracy of this automated transcription, some errors in transcription may have occurred. Attestation signed by      Attending Physician Statement:    I have discussed the care of  Priscilla Willoughby , including pertinent history and exam findings, with the Cardiology fellow/resident. I have seen and examined the patient and the key elements of all parts of the encounter have been performed by me.  I agree with the assessment, plan and orders as documented by the fellow/resident, after I modified exam findings and plan of treatments, and the final version is my approved version of the assessment. Additional Comments: The patient was seen and examined, agree with above, presented with chest pain, more with deep  Breathing and coughing. ECG and Beth show letitia cute changes. Percocet help. Nitro does not help. Previous cath in 2017 showed normal coronary arteries. Stress test in 2019 showed no ischemia. NO further cardiac work up needed.

## 2020-01-16 NOTE — PROGRESS NOTES
Smoking Cessation - topics covered   []  Health Risks  []  Benefits of Quitting   []  Smoking Cessation  [x]  Patient has no history of tobacco use per note in significant history. []  Patient is former smoker per note in significant history. Patient quit in   [x]  No need for tobacco cessation education. []  Booklet given  []  Patient verbalizes understanding. []  Patient denies need for tobacco cessation education. []  Unable to meet with patient today. Will follow up as able.   Marialuisa Bright  9:14 AM

## 2020-01-16 NOTE — CARE COORDINATION
[address] Transitions Interview     2020    Patient: Karol Abbott Patient : 1950   MRN: [id number]  Reason for Admission: Chest pain [R07.9]  RARS: Readmission Risk Score: 13         Spoke with: Karol Abbott    Met with Jose M Suarez at bedside. She is planning on returning home at discharge. When pt was asked if she needed anything at home, she requested a rolator. Tila ANN notified of pt's request.  Explained CTN role and she was receptive to further outreach. Explained that it would be one call, since her physician is a non [address] [place] [address]. Contact information provided.       Readmission Risk  Patient Active Problem List   Diagnosis    Chronic back pain    GERD (gastroesophageal reflux disease)    Hyperlipidemia    Hypertension    Failed back syndrome    DDD (degenerative disc disease), lumbar    Lumbar facet joint pain    Lumbar radicular pain    DDD (degenerative disc disease), cervical    Cervical spondylosis  S/P Cervical fusion    Osteoarthritis of spine with radiculopathy, lumbar region    Lumbar facet arthropathy    Menopausal and postmenopausal disorder    Anemia, pernicious    Hiatal hernia    Anxiety    Unstable angina (HCC)    B12 deficiency    Varicose veins of both lower extremities    S/P lumbar fusion    POONAM (obstructive sleep apnea)    Pure hypercholesterolemia    Deep vein thrombosis (DVT) of axillary vein of right upper extremity (HCC)    Paget-Schroetter syndrome    Acute deep vein thrombosis (DVT) of axillary vein of right upper extremity ([address]    Long term current use of anticoagulant therapy    Fracture of spine, cervical, without spinal cord injury, closed (HCC)    Tendon tear    Chronic pain of both shoulders    Need for prophylactic vaccination with Streptococcus pneumoniae (Pneumococcus) and Influenza vaccines    Arm DVT (deep venous thromboembolism), acute (HCC)    Sacroiliac joint disease    Cervical post-laminectomy syndrome    Myofascial pain    Chest pain    Subtherapeutic international normalized ratio (INR)       Inpatient Assessment  Care Transitions Summary    Care Transitions Inpatient Review  Medication Review  Do you have all of your prescriptions and are they filled?:  Yes   Barriers to Medication Adherence:  None  Are you able to afford your medications?:  Yes  How often do you have difficulty taking your medications?:  I always take them as prescribed. Housing Review  Who do you live with?:  Partner/Spouse/SO  Are you an active caregiver in your home?:  No  Social Support  Durable Medical Equipment  Patient DME:  Straight cane, Other  Other Patient DME:  rollator walker  Patient Home Equipment:  Nebulizer  Functional Review  Ability handle personal hygiene needs (bathing/dressing/grooming): Independent  Ability to manage medications: Independent  Ability to prepare food:  Independent  Ability to maintain home (clean home, laundry): Independent  Ability to drive and/or has transportation:  Needs Assistance  Ability to do shopping:  Needs Assistance  Ability to manage finances: Independent  Is patient able to live independently?:  Yes  Hearing and Vision  Care Transitions Interventions                                 Follow Up  No future appointments.     Health Maintenance  Health Maintenance Due   Topic Date Due    DEXA (modify frequency per FRAX score)  01/16/2017    A1C test (Diabetic or Prediabetic)  11/10/2017    Breast cancer screen  05/17/2018       Kingsley Chavira RN

## 2020-01-16 NOTE — PROGRESS NOTES
[facility]  Occupational Therapy Not Seen Note    DATE: 2020  Name: Kelly Nolasco  : 1950  MRN: [id number]    Patient not available for Occupational Therapy due to:    [] Testing:    [] Hemodialysis    [] Blood Transfusion in Progress    []Refusal by Patient:    [] Surgery/Procedure:    [] Strict Bedrest    [] Sedation    [] Spine Precautions     [] Pt being transferred to palliative care at this time. Spoke with pt/family and OT services to be defered. [] Pt independent with functional mobility and functional tasks. Pt with no OT acute care needs at this time, will defer OT eval.    [x] Other: Per cardiology note, plan for CT of chest to rule out PE.  Will check back following results as time allows or     Next Scheduled Treatment: Check PM or     Anson Espinoza OTR/L

## 2020-01-16 NOTE — H&P
[facility]    HISTORY AND PHYSICAL EXAMINATION            Date:   1/16/2020  Patient name:  Patrick Najera  Date of admission:  1/15/2020  5:24 PM  MRN:   [id number]  Account:  [de-identified]  YOB: 1950  PCP:    Preet Chilel  Room:   2006/2006-01  Code Status:    Full Code    Chief Complaint:     Chest Pain    History Obtained From:     patient, electronic medical record    History of Present Illness: The patient is a 78 y.o. Non-/non  female who presents with Chest Pain and she is admitted to the hospital for the management of  Atypical Chest Pain. 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 She presented with the following:     Presents with chest pain which began in the evening before presentation. She described it as chest pain in the center of her chest which radiated to her left shoulder. It also wraps around her back. She described it as sharp pain which was 8 out of 10 in intensity. It was associated with some shortness of breath, dizziness, lightheadedness, nausea, and tingling and numbness in her fingers. She had a stress test 3 months ago which was normal.    In the ED, troponin was normal.  There is no improvement of her chest pain with nitroglycerin. She noted improvement with fentanyl. Patient was admitted for further care.        Past Medical History:     Past Medical History:   Diagnosis Date    Anxiety     Arthritis     Asthma     Blood circulation, collateral     CAD (coronary artery disease)     Chronic back pain     Depression     YEARS AGO NO RECENT PROBLEMS    Difficult intravenous access     AT TIMES    Disease of blood and blood forming organ     Diverticulitis     DVT (deep venous thrombosis) (HCC)     Right upper arm    Dyslexia tablet by mouth 3 times daily as needed for Pain. Historical Provider, MD   warfarin (COUMADIN) 3 MG tablet Take 1 tablet by mouth daily 9/3/19   Berry Morrison MD   tiZANidine (ZANAFLEX) 4 MG tablet Take 1 tablet by mouth every 8 hours as needed (muscle spasms) 5/24/19   CHRISTIAN Warren - CNP   Blood Pressure Monitoring (BLOOD PRESSURE CUFF) MISC 1 each by Does not apply route 2 times daily 4/25/17   Tay Colindres MD   atorvastatin (LIPITOR) 20 MG tablet Take 1 tablet by mouth nightly 3/13/17   Tay Colindres MD   albuterol (PROVENTIL) (2.5 MG/3ML) 0.083% nebulizer solution INHALE THE CONTENTS OF 1 VIAL VIA NEBULIZER EVERY 6 HOURS AS NEEDED  FOR  WHEEZING (SUBSTITUTED FOR PROVENTIL) 11/2/16   Randy Cedillo MD        Allergies:     Xarelto [rivaroxaban]; Morphine; Seasonal; and Adhesive tape    Social History:     Tobacco:    reports that she has never smoked. She has never used smokeless tobacco.  Alcohol:      reports no history of alcohol use. Drug Use:  reports no history of drug use. Family History:     Family History   Problem Relation Age of Onset    Heart Disease Mother     Cancer Father         prostate    Cancer Brother         leukemia    Cancer Maternal Grandmother         colon    Cancer Paternal Grandmother         colon    Cancer Brother         PROSTATE    Cancer Brother         LUNG    Asthma Brother        Review of Systems:     Negative except for those highlighted:    CONSTITUTIONAL:  fevers, chills, sweats, fatigue, weight loss, generalized weakness  HEENT:  Vision changes, hearing changes, runny nose, throat pain  RESPIRATORY:  shortness of breath, cough, congestion, wheezing.   CARDIOVASCULAR:  chest pain, palpitations  GASTROINTESTINAL:  nausea, vomiting, diarrhea, constipation, change in bowel habits, abdominal pain   GENITOURINARY:  difficulty of urination, burning with urination, frequency   INTEGUMENT:  rash, skin lesions, easy bruising   HEMATOLOGIC/LYMPHATIC: swelling/edema   ALLERGIC/IMMUNOLOGIC:  urticaria , itching  ENDOCRINE:  increase in drinking, increase in urination, hot or cold intolerance  MUSCULOSKELETAL:  joint pains, muscle aches, swelling of joints  NEUROLOGICAL:  headaches, dizziness, lightheadedness, numbness, pain, tingling extremities  BEHAVIOR/PSYCH:  depression, anxiety    Physical Exam:   BP (!) 93/48   Pulse 71   Temp 97.8 °F (36.6 °C) (Oral)   Resp 19   Ht 5' 4\" (1.626 m)   Wt 180 lb 12.8 oz (82 kg)   LMP 10/05/1986 (Approximate)   SpO2 95%   BMI 31.03 kg/m²   Temp (24hrs), Av.8 °F (36.6 °C), Min:97.5 °F (36.4 °C), Max:98.1 °F (36.7 °C)    No results for input(s): POCGLU in the last 72 hours.   No intake or output data in the 24 hours ending 20 8198    General Appearance: alert, well appearing, and in no acute distress  Mental status: oriented to person, place, and time  Head: normocephalic, atraumatic  Eye: no icterus, redness, pupils equal and reactive, extraocular eye movements intact, conjunctiva clear  Ear: normal external ear, no discharge, hearing intact  Nose: no drainage noted  Mouth: mucous membranes moist  Neck: supple, no carotid bruits, thyroid not palpable  Lungs: Bilateral equal air entry, clear to ausculation, no wheezing, rales or rhonchi, normal effort  Cardiovascular: normal rate, regular rhythm, no murmur, gallop, rub  Abdomen: Soft, nontender, nondistended, normal bowel sounds, no hepatomegaly or splenomegaly  Neurologic: There are no new focal motor or sensory deficits, normal muscle tone and bulk, no abnormal sensation, normal speech, cranial nerves II through XII grossly intact  Skin: No gross lesions, rashes, bruising or bleeding on exposed skin area  Extremities: peripheral pulses palpable, no pedal edema or calf pain with palpation  Psych: normal affect    Investigations:      Laboratory Testing:  Recent Results (from the past 24 hour(s))   Troponin    Collection Time: 01/15/20  8:15 PM   Result Value Ref Range    Troponin, High Sensitivity <6 0 - 14 ng/L    Troponin T NOT REPORTED <0.03 ng/mL    Troponin Interp NOT REPORTED    Brain natriuretic peptide    Collection Time: 01/15/20  8:15 PM   Result Value Ref Range    Pro-BNP 22 <300 pg/mL    BNP Interpretation Pro-BNP Reference Range:    PROTIME-INR    Collection Time: 01/15/20  8:15 PM   Result Value Ref Range    Protime 17.5 (H) 9.0 - 12.0 sec    INR 1.7    Troponin    Collection Time: 01/15/20 11:39 PM   Result Value Ref Range    Troponin, High Sensitivity <6 0 - 14 ng/L    Troponin T NOT REPORTED <0.03 ng/mL    Troponin Interp NOT REPORTED    BASIC METABOLIC PANEL    Collection Time: 01/16/20  1:30 AM   Result Value Ref Range    Glucose 101 (H) 70 - 99 mg/dL    BUN 11 8 - 23 mg/dL    CREATININE 0.65 0.50 - 0.90 mg/dL    Bun/Cre Ratio NOT REPORTED 9 - 20    Calcium 9.0 8.6 - 10.4 mg/dL    Sodium 136 135 - 144 mmol/L    Potassium 3.9 3.7 - 5.3 mmol/L    Chloride 100 98 - 107 mmol/L    CO2 23 20 - 31 mmol/L    Anion Gap 13 9 - 17 mmol/L    GFR Non-African American >60 >60 mL/min    GFR African American >60 >60 mL/min    GFR Comment          GFR Staging NOT REPORTED    MAGNESIUM    Collection Time: 01/16/20  1:30 AM   Result Value Ref Range    Magnesium 2.0 1.6 - 2.6 mg/dL   CBC    Collection Time: 01/16/20  1:30 AM   Result Value Ref Range    WBC 6.5 3.5 - 11.3 k/uL    RBC 4.62 3.95 - 5.11 m/uL    Hemoglobin 13.3 11.9 - 15.1 g/dL    Hematocrit 42.2 36.3 - 47.1 %    MCV 91.3 82.6 - 102.9 fL    MCH 28.8 25.2 - 33.5 pg    MCHC 31.5 28.4 - 34.8 g/dL    RDW 13.8 11.8 - 14.4 %    Platelets 614 645 - 740 k/uL    MPV 9.5 8.1 - 13.5 fL    NRBC Automated 0.0 0.0 per 100 WBC   Comprehensive Metabolic Panel w/ Reflex to MG    Collection Time: 01/16/20  5:44 AM   Result Value Ref Range    Glucose 94 70 - 99 mg/dL    BUN 14 8 - 23 mg/dL    CREATININE 0.81 0.50 - 0.90 mg/dL    Bun/Cre Ratio NOT REPORTED 9 - 20    Calcium 9.5 8.6 - 10.4 mg/dL    Sodium 137 135 - 144 mmol/L Potassium 3.9 3.7 - 5.3 mmol/L    Chloride 100 98 - 107 mmol/L    CO2 22 20 - 31 mmol/L    Anion Gap 15 9 - 17 mmol/L    Alkaline Phosphatase 88 35 - 104 U/L    ALT 16 5 - 33 U/L    AST 21 <32 U/L    Total Bilirubin 0.40 0.3 - 1.2 mg/dL    Total Protein 6.4 6.4 - 8.3 g/dL    Alb 3.8 3.5 - 5.2 g/dL    Albumin/Globulin Ratio 1.5 1.0 - 2.5    GFR Non-African American >60 >60 mL/min    GFR African American >60 >60 mL/min    GFR Comment          GFR Staging NOT REPORTED    Magnesium    Collection Time: 01/16/20  5:44 AM   Result Value Ref Range    Magnesium 2.1 1.6 - 2.6 mg/dL   Lipid panel - fasting    Collection Time: 01/16/20  5:44 AM   Result Value Ref Range    Cholesterol 191 <200 mg/dL    HDL 38 (L) >40 mg/dL    LDL Cholesterol 110 0 - 130 mg/dL    Chol/HDL Ratio 5.0 (H) <5    Triglycerides 217 (H) <150 mg/dL    VLDL NOT REPORTED (H) 1 - 30 mg/dL   CBC    Collection Time: 01/16/20  5:44 AM   Result Value Ref Range    WBC 6.6 3.5 - 11.3 k/uL    RBC 4.69 3.95 - 5.11 m/uL    Hemoglobin 13.6 11.9 - 15.1 g/dL    Hematocrit 42.9 36.3 - 47.1 %    MCV 91.5 82.6 - 102.9 fL    MCH 29.0 25.2 - 33.5 pg    MCHC 31.7 28.4 - 34.8 g/dL    RDW 14.0 11.8 - 14.4 %    Platelets 862 170 - 241 k/uL    MPV 9.8 8.1 - 13.5 fL    NRBC Automated 0.0 0.0 per 100 WBC   PROTIME-INR    Collection Time: 01/16/20  5:44 AM   Result Value Ref Range    Protime 16.1 (H) 9.0 - 12.0 sec    INR 1.6        Imaging/Diagnostics:  Xr Chest Portable    Result Date: 1/15/2020  No acute cardiopulmonary process. Xr Chest Portable    Result Date: 1/14/2020  No radiographic evidence of acute cardiopulmonary disease.        Assessment :      Hospital Problems           Last Modified POA    * (Principal) Chest pain 1/16/2020 Yes    GERD (gastroesophageal reflux disease) 1/16/2020 Yes    Essential hypertension 1/16/2020 Yes    POONAM (obstructive sleep apnea) 1/16/2020 Yes    Pure hypercholesterolemia 1/16/2020 Yes    Long term current use of anticoagulant therapy

## 2020-01-16 NOTE — PROGRESS NOTES
[facility]  Documentation Note                Admission Dx: Chest pain [R07.9]    Past Medical History:   has a past medical history of Anxiety, Arthritis, Asthma, Blood circulation, collateral, CAD (coronary artery disease), Chronic back pain, Depression, Difficult intravenous access, Disease of blood and blood forming organ, Diverticulitis, DVT (deep venous thrombosis) ([facility] [address] Dyslexia, GERD (gastroesophageal reflux disease), Headache(784.0), Hx of blood clots, Hyperlipidemia, Hypertension, Migraine, Mitral valve prolapse, Movement disorder, Neuromuscular disorder ([facility] [address] Sleep apnea, Wears dentures, and Wears glasses. Previous Testing:     STRESS 9/3/19: No ischemia/infarct. EF 68%. CATH 9/1/17: Normal coronaries. EF 60%. HOLTER 4/11/17: NSR with SA. Occasional supraventricular beats. Rare PVCs. ECHO 4/10/17: EF 60%, mild TR. Previous office/hospital visit:     Dr. Nino Segura 4/12/16:   1. Chest pain, unspecified chest pain type      2. Essential hypertension  metoprolol (LOPRESSOR) 25 MG tablet   3. Dyslipidemia      4. Heart palpitations       Doing fine from cardiovascular standpoint. Negative stress test.  Intermittent palpitations mainly at night.     Continue risk factor modification and medical management. Continue Chlorthalidone. Increase metoprolol back to 25 mg BID.     Continue Lipitor 20 mg daily, check lipid profile. I plan to see her back after one year or sooner if needed. Home Medications:      Prior to Admission medications    Medication Sig Start Date End Date Taking? Authorizing Provider   nitroGLYCERIN (NITROSTAT) 0.4 MG SL tablet Place 0.4 mg under the tongue every 5 minutes as needed for Chest pain up to max of 3 total doses. If no relief after 1 dose, call 911.     Historical Provider, MD   Cyanocobalamin (VITAMIN B-12) 1000 MCG SUBL Take 1 tablet by mouth daily 7/15/19   Historical Provider, MD   calcium-vitamin D (OSCAL-500) 500-200 MG-UNIT per tablet Take 1 tablet by mouth 2 times daily    Historical Provider, MD   LORazepam (ATIVAN) 1 MG tablet Take 1 mg by mouth 3 times daily as needed for Anxiety. Historical Provider, MD   omeprazole (PRILOSEC) 40 MG delayed release capsule Take 40 mg by mouth daily    Historical Provider, MD   oxyCODONE-acetaminophen (PERCOCET) 5-325 MG per tablet Take 1 tablet by mouth 3 times daily as needed for Pain.     Historical Provider, MD   warfarin (COUMADIN) 3 MG tablet Take 1 tablet by mouth daily 9/3/19   Kelli Altamirano MD   tiZANidine (ZANAFLEX) 4 MG tablet Take 1 tablet by mouth every 8 hours as needed (muscle spasms) 5/24/19   CHRISTIAN Pittman - CNP   Blood Pressure Monitoring (BLOOD PRESSURE CUFF) MISC 1 each by Does not apply route 2 times daily 4/25/17   Larence Blizzard, MD   atorvastatin (LIPITOR) 20 MG tablet Take 1 tablet by mouth nightly 3/13/17   Larence Blizzard, MD   albuterol (PROVENTIL) (2.5 MG/3ML) 0.083% nebulizer solution INHALE THE CONTENTS OF 1 VIAL VIA NEBULIZER EVERY 6 HOURS AS NEEDED  FOR  WHEEZING (SUBSTITUTED FOR PROVENTIL) 11/2/16   Russell Solis MD      Current Facility-Administered Medications: melatonin tablet 5 mg, 5 mg, Oral, Once  [START ON 1/17/2020] melatonin tablet 5 mg, 5 mg, Oral, Nightly PRN  glycerin moisturizing mouth spray (OASIS) 35 % 2 spray, 2 spray, Mouth/Throat, PRN  atorvastatin (LIPITOR) tablet 20 mg, 20 mg, Oral, Nightly  calcium carbonate-vitamin D (CALTRATE) 600-400 MG-UNIT per tab 1 tablet, 1 tablet, Oral, BID  vitamin B-12 (CYANOCOBALAMIN) tablet 1,000 mcg, 1,000 mcg, Oral, Daily  sodium chloride flush 0.9 % injection 10 mL, 10 mL, Intravenous, 2 times per day  sodium chloride flush 0.9 % injection 10 mL, 10 mL, Intravenous, PRN  potassium chloride (KLOR-CON M) extended release tablet 40 mEq, 40 mEq, Oral, PRN **OR** potassium bicarb-citric acid (EFFER-K) effervescent tablet 40 mEq, 40 mEq, Oral, PRN **OR** potassium chloride 10 mEq/100 mL IVPB (Peripheral Line), 10 mEq, Intravenous, PRN  potassium chloride 10 mEq/100 mL IVPB (Peripheral Line), 10 mEq, Intravenous, PRN  magnesium sulfate 1 g in dextrose 5% 100 mL IVPB, 1 g, Intravenous, PRN  magnesium hydroxide (MILK OF MAGNESIA) 400 MG/5ML suspension 30 mL, 30 mL, Oral, Daily PRN  ondansetron (ZOFRAN) injection 4 mg, 4 mg, Intravenous, Q6H PRN  aspirin EC tablet 325 mg, 325 mg, Oral, Daily  nitroGLYCERIN (NITROSTAT) SL tablet 0.4 mg, 0.4 mg, Sublingual, Q5 Min PRN  famotidine (PEPCID) tablet 20 mg, 20 mg, Oral, Daily  carvedilol (COREG) tablet 3.125 mg, 3.125 mg, Oral, BID WC  lisinopril (PRINIVIL;ZESTRIL) tablet 5 mg, 5 mg, Oral, Daily  oxyCODONE-acetaminophen (PERCOCET) 5-325 MG per tablet 1 tablet, 1 tablet, Oral, Q8H PRN  LORazepam (ATIVAN) tablet 1 mg, 1 mg, Oral, TID PRN  enoxaparin (LOVENOX) injection 80 mg, 1 mg/kg, Subcutaneous, BID    Labs:     CBC:   Recent Labs     01/16/20  0130 01/16/20  0544   WBC 6.5 6.6   HGB 13.3 13.6   HCT 42.2 42.9    216     BMP:   Recent Labs     01/16/20  0130 01/16/20  0544    137   K 3.9 3.9   CO2 23 22   BUN 11 14   CREATININE 0.65 0.81   LABGLOM >60 >60   GLUCOSE 101* 94     PT/INR:   Recent Labs     01/15/20  2015 01/16/20  0544   PROTIME 17.5* 16.1*   INR 1.7 1.6     CARDIAC ENZYMES:  Recent Labs     01/15/20  0827 01/15/20  2015 01/15/20  2339   TROPHS NOT REPORTED <6 <6     FASTING LIPID PANEL:  Lab Results   Component Value Date    HDL 38 01/16/2020    TRIG 217 01/16/2020     LIVER PROFILE:  Recent Labs     01/16/20  0544   AST 21   ALT 16   LABALBU 3.8       [facility]   Pager: [phone]

## 2020-01-16 NOTE — PROGRESS NOTES
Physical Therapy  DATE: 2020    NAME: Michael Peck  MRN: [id number]   : 1950    Patient not seen this date for Physical Therapy due to:  [] Blood transfusion in progress  [] Hemodialysis  []  Patient Declined  [] Spine Precautions   [] Strict Bedrest  [] Surgery/ Procedure  [] Testing      [x] Other: Per cardiology, plan for CT chest to rule out PE. PT will check back following result as time allows this PM or 20. [] PT being discontinued at this time. Patient independent. No further needs. [] PT being discontinued at this time as the patient has been transferred to palliative care. No further needs.     Terry Galloway, PT

## 2020-01-17 ENCOUNTER — APPOINTMENT (OUTPATIENT)
Dept: CT IMAGING | Age: 70
DRG: 313 | End: 2020-01-17
Attending: INTERNAL MEDICINE
Payer: MEDICARE

## 2020-01-17 VITALS
HEART RATE: 64 BPM | BODY MASS INDEX: 30.87 KG/M2 | OXYGEN SATURATION: 98 % | DIASTOLIC BLOOD PRESSURE: 63 MMHG | HEIGHT: 64 IN | TEMPERATURE: 97.8 F | SYSTOLIC BLOOD PRESSURE: 115 MMHG | RESPIRATION RATE: 16 BRPM | WEIGHT: 180.8 LBS

## 2020-01-17 LAB
HCT VFR BLD CALC: 40.4 % (ref 36.3–47.1)
HEMOGLOBIN: 12.9 G/DL (ref 11.9–15.1)
INR BLD: 1.5
MCH RBC QN AUTO: 29.3 PG (ref 25.2–33.5)
MCHC RBC AUTO-ENTMCNC: 31.9 G/DL (ref 28.4–34.8)
MCV RBC AUTO: 91.8 FL (ref 82.6–102.9)
NRBC AUTOMATED: 0 PER 100 WBC
PDW BLD-RTO: 14 % (ref 11.8–14.4)
PLATELET # BLD: 201 K/UL (ref 138–453)
PMV BLD AUTO: 9.9 FL (ref 8.1–13.5)
PROTHROMBIN TIME: 15.4 SEC (ref 9–12)
RBC # BLD: 4.4 M/UL (ref 3.95–5.11)
WBC # BLD: 5.9 K/UL (ref 3.5–11.3)

## 2020-01-17 PROCEDURE — 96372 THER/PROPH/DIAG INJ SC/IM: CPT

## 2020-01-17 PROCEDURE — 85610 PROTHROMBIN TIME: CPT

## 2020-01-17 PROCEDURE — 6370000000 HC RX 637 (ALT 250 FOR IP): Performed by: INTERNAL MEDICINE

## 2020-01-17 PROCEDURE — 85027 COMPLETE CBC AUTOMATED: CPT

## 2020-01-17 PROCEDURE — 71260 CT THORAX DX C+: CPT

## 2020-01-17 PROCEDURE — G0378 HOSPITAL OBSERVATION PER HR: HCPCS

## 2020-01-17 PROCEDURE — 6360000004 HC RX CONTRAST MEDICATION: Performed by: INTERNAL MEDICINE

## 2020-01-17 PROCEDURE — 2580000003 HC RX 258: Performed by: NURSE PRACTITIONER

## 2020-01-17 PROCEDURE — 6360000002 HC RX W HCPCS: Performed by: NURSE PRACTITIONER

## 2020-01-17 PROCEDURE — 99239 HOSP IP/OBS DSCHRG MGMT >30: CPT | Performed by: INTERNAL MEDICINE

## 2020-01-17 PROCEDURE — 6370000000 HC RX 637 (ALT 250 FOR IP): Performed by: NURSE PRACTITIONER

## 2020-01-17 PROCEDURE — 36415 COLL VENOUS BLD VENIPUNCTURE: CPT

## 2020-01-17 RX ORDER — CYCLOBENZAPRINE HCL 5 MG
5 TABLET ORAL 2 TIMES DAILY PRN
Qty: 10 TABLET | Refills: 0 | Status: SHIPPED | OUTPATIENT
Start: 2020-01-17 | End: 2020-01-27

## 2020-01-17 RX ORDER — WARFARIN SODIUM 5 MG/1
5 TABLET ORAL
Status: COMPLETED | OUTPATIENT
Start: 2020-01-17 | End: 2020-01-17

## 2020-01-17 RX ORDER — OXYCODONE HYDROCHLORIDE AND ACETAMINOPHEN 5; 325 MG/1; MG/1
1 TABLET ORAL EVERY 8 HOURS PRN
Qty: 9 TABLET | Refills: 0 | Status: SHIPPED | OUTPATIENT
Start: 2020-01-17 | End: 2020-01-20

## 2020-01-17 RX ORDER — CARVEDILOL 3.12 MG/1
3.12 TABLET ORAL 2 TIMES DAILY WITH MEALS
Qty: 60 TABLET | Refills: 3 | Status: SHIPPED | OUTPATIENT
Start: 2020-01-17

## 2020-01-17 RX ADMIN — OXYCODONE HYDROCHLORIDE AND ACETAMINOPHEN 1 TABLET: 5; 325 TABLET ORAL at 10:11

## 2020-01-17 RX ADMIN — CARVEDILOL 3.12 MG: 3.12 TABLET, FILM COATED ORAL at 18:24

## 2020-01-17 RX ADMIN — CARVEDILOL 3.12 MG: 3.12 TABLET, FILM COATED ORAL at 09:04

## 2020-01-17 RX ADMIN — Medication 1000 MCG: at 09:04

## 2020-01-17 RX ADMIN — ENOXAPARIN SODIUM 80 MG: 80 INJECTION SUBCUTANEOUS at 09:04

## 2020-01-17 RX ADMIN — IOHEXOL 75 ML: 350 INJECTION, SOLUTION INTRAVENOUS at 11:24

## 2020-01-17 RX ADMIN — ASPIRIN 325 MG: 325 TABLET, COATED ORAL at 09:04

## 2020-01-17 RX ADMIN — OXYCODONE HYDROCHLORIDE AND ACETAMINOPHEN 1 TABLET: 5; 325 TABLET ORAL at 18:26

## 2020-01-17 RX ADMIN — FAMOTIDINE 20 MG: 20 TABLET, FILM COATED ORAL at 09:04

## 2020-01-17 RX ADMIN — Medication 10 ML: at 09:04

## 2020-01-17 RX ADMIN — WARFARIN SODIUM 5 MG: 5 TABLET ORAL at 18:26

## 2020-01-17 RX ADMIN — Medication 1 TABLET: at 09:04

## 2020-01-17 ASSESSMENT — PAIN SCALES - GENERAL
PAINLEVEL_OUTOF10: 3
PAINLEVEL_OUTOF10: 4
PAINLEVEL_OUTOF10: 0
PAINLEVEL_OUTOF10: 10

## 2020-01-17 ASSESSMENT — PAIN DESCRIPTION - LOCATION: LOCATION: BACK

## 2020-01-17 ASSESSMENT — PAIN DESCRIPTION - PAIN TYPE: TYPE: CHRONIC PAIN

## 2020-01-17 NOTE — PROGRESS NOTES
Diverticulitis, DVT (deep venous thrombosis) ([facility] Dyslexia, GERD (gastroesophageal reflux disease), Headache(784.0), Hx of blood clots, Hyperlipidemia, Hypertension, Migraine, Mitral valve prolapse, Movement disorder, Neuromuscular disorder ([address] Sleep apnea, Wears dentures, and Wears glasses. Social History:   reports that she has never smoked. She has never used smokeless tobacco. She reports that she does not drink alcohol or use drugs. Family History:   Family History   Problem Relation Age of Onset    Heart Disease Mother     Cancer Father         prostate    Cancer Brother         leukemia    Cancer Maternal Grandmother         colon    Cancer Paternal Grandmother         colon    Cancer Brother         PROSTATE    Cancer Brother         LUNG    Asthma Brother        Vitals:  /62   Pulse 71   Temp 97.7 °F (36.5 °C) (Oral)   Resp 18   Ht 5' 4\" (1.626 m)   Wt 180 lb 12.8 oz (82 kg)   LMP 10/05/1986 (Approximate)   SpO2 98%   BMI 31.03 kg/m²   Temp (24hrs), Av.9 °F (36.6 °C), Min:97.7 °F (36.5 °C), Max:98.3 °F (36.8 °C)    No results for input(s): POCGLU in the last 72 hours. I/O (24Hr):   No intake or output data in the 24 hours ending 20 0754    Labs:  Hematology:  Recent Labs     01/15/20  2015 01/16/20  0130 20  0544 20  0536   WBC  --  6.5 6.6 5.9   RBC  --  4.62 4.69 4.40   HGB  --  13.3 13.6 12.9   HCT  --  42.2 42.9 40.4   MCV  --  91.3 91.5 91.8   MCH  --  28.8 29.0 29.3   MCHC  --  31.5 31.7 31.9   RDW  --  13.8 14.0 14.0   PLT  --  236 216 201   MPV  --  9.5 9.8 9.9   INR 1.7  --  1.6 1.5     Chemistry:  Recent Labs     20  1139  01/15/20  0827 01/15/20  2015 01/15/20  2339 20  0130 20  0544     --   --   --   --  136 137   K 3.9  --   --   --   --  3.9 3.9   CL 98  --   --   --   --  100 100   CO2 25  --   --   --   --  23 22   GLUCOSE 92  --   --   --   --  101* 94   BUN 13  --   --   --   --  11 14   CREATININE 0.63  -- --   --   --  0.65 0.81   MG  --   --   --   --   --  2.0 2.1   ANIONGAP 13  --   --   --   --  13 15   LABGLOM >60  --   --   --   --  >60 >60   GFRAA >60  --   --   --   --  >60 >60   CALCIUM 9.6  --   --   --   --  9.0 9.5   PROBNP 30  --   --  22  --   --   --    TROPHS NOT REPORTED   < > NOT REPORTED <6 <6  --   --     < > = values in this interval not displayed. Recent Labs     01/16/20  0544   PROT 6.4   LABALBU 3.8   AST 21   ALT 16   ALKPHOS 88   BILITOT 0.40   CHOL 191   HDL 38*   LDLCHOLESTEROL 110   CHOLHDLRATIO 5.0*   TRIG 217*   VLDL NOT REPORTED*     ABG:No results found for: POCPH, PHART, PH, POCPCO2, VUV2EHZ, PCO2, POCPO2, PO2ART, PO2, POCHCO3, WTD4AMM, HCO3, NBEA, PBEA, BEART, BE, THGBART, THB, RTX1VYX, ITAV6HLE, P3WNVZTV, O2SAT, FIO2  Lab Results   Component Value Date/Time    SPECIAL NOT REPORTED 01/14/2020 12:09 PM     Lab Results   Component Value Date/Time    CULTURE NO GROWTH 5 DAYS 11/05/2016 07:20 PM    CULTURE  11/05/2016 07:20 PM     Performed at [facility], [address] [place] [address]    CULTURE  [phone] 11/05/2016 07:20 PM       Radiology:  Xr Chest Portable    Result Date: 1/15/2020  No acute cardiopulmonary process. Xr Chest Portable    Result Date: 1/14/2020  No radiographic evidence of acute cardiopulmonary disease.        Physical Examination:        General appearance:  alert, cooperative and no distress  Mental Status:  oriented to person, place and time and normal affect  Lungs:  clear to auscultation bilaterally, normal effort  Heart:  regular rate and rhythm, no murmur  Abdomen:  soft, nontender, nondistended, normal bowel sounds, no masses, hepatomegaly, splenomegaly  Extremities:  no edema, redness, tenderness in the calves  Skin:  no gross lesions, rashes, induration    Assessment:        Hospital Problems           Last Modified POA    * (Principal) Chest pain 1/16/2020 Yes    GERD (gastroesophageal reflux disease) 1/16/2020 Yes Essential hypertension 1/16/2020 Yes    POONAM (obstructive sleep apnea) 1/16/2020 Yes    Pure hypercholesterolemia 1/16/2020 Yes    Long term current use of anticoagulant therapy 1/16/2020 Yes    Overview Signed 8/5/2018  5:10 PM by [facility]     Replacing Deprecated Diagnoses         Subtherapeutic international normalized ratio (INR) 1/16/2020 Yes          Plan:        Atypical Chest Pain, likely musculoskeletal in origin. Unlikely cardiac etiology. CTA chest negative for PE or pulmonologic process. Okay for dc on short course of flexeril. To f/u with Pain Mx and neurosurgeon as outpatient.      HTN: stable,resume home meds. Monitor BP and adjust meds as needed     Check Electrolytes and Electrolytes replacement as needed      DVT prophylaxis: already anticoagulated with Coumadin. Subtherapeutic INR. To be dc'd with lovenox to bridge to therapeutic INR. To be monitored by PCP Dr. Lulu Correa.      PT, OT as needed.       Johnathan Jenkins MD  1/17/2020  7:54 AM

## 2020-01-17 NOTE — PROGRESS NOTES
CLINICAL PHARMACY NOTE: MEDS TO [address] Select Patient?: Yes  Total # of Prescriptions Filled: 3   The following medications were delivered to the patient:  · COREG   · LOVENOX   · FLEXERIL   Total # of Interventions Completed: 0  Time Spent (min): 60    Additional Documentation:

## 2020-01-17 NOTE — PROGRESS NOTES
Pharmacy Note  Warfarin Consult follow-up      Recent Labs     01/17/20  0536   INR 1.5     Recent Labs     01/16/20  0130 01/16/20  0544 01/17/20  0536   HGB 13.3 13.6 12.9   HCT 42.2 42.9 40.4    216 201       Significant Drug-Drug Interactions:  New warfarin drug-drug interactions: none  Discontinued drug-drug interactions: none   Current warfarin drug-drug interactions: aspirin, atorvastatin, lovenox    Date INR Dose   1/14 2.4 --   1/15 1.7 --   1/16  1.6 3mg   1/17 1.5 5mg     Notes: INR subtherapeutic. Will order warfarin 5 mg dose for today only (1/17/2020) as a boost dose. Then plan to resume home dosing. Daily PT/INR while inpatient. Kesha Rivera, Pharm.  D.  PGY2 Ambulatory Care Pharmacist Resident   [facility]) Medication Management Service  [phone]  1/17/2020 9:26 AM

## 2020-01-17 NOTE — DISCHARGE INSTR - COC
Continuity of Care Form    Patient Name: Orlando Jones   :  1950  MRN:  [id number]    Admit date:  1/15/2020  Discharge date:  2020    Code Status Order: Full Code   Advance Directives:   Advance Care Flowsheet Documentation     Date/Time Healthcare Directive Type of Healthcare Directive Copy in [address] Agent's Name Healthcare Agent's Phone Number    01/15/20 1737  No, patient does not have an advance directive for healthcare treatment -- -- -- -- --          Admitting Physician:  Deb Benjamin MD  PCP: Keo Pham    [facility]ing [facility]   Discharging Unit Phone Number: [phone]    Emergency Contact:   Extended Emergency Contact Information  Primary Emergency Contact: Beth Wise  Address: [address] [place] [address], [address] [place] [address] Phone: [phone]  Mobile Phone: [phone]  Relation: Spouse  Secondary Emergency Contact: Toan Barboza  Address: [place], [address] Phone: [phone]  Mobile Phone: [phone]  Relation: Child    Past Surgical History:  Past Surgical History:   Procedure Laterality Date    BACK SURGERY      LUMBAR UNSURE WHEN    BACK SURGERY  10/17/2016    hardware removal, revision posterior instrumentation    CERVICAL FUSION  2013    Anterior    COLONOSCOPY  2012    normal    HYSTERECTOMY  1986    TOTAL    SIGMOIDOSCOPY  2012    normal    SINUS SURGERY      SPINE SURGERY  2005    LUMBAR    TONSILLECTOMY  2002    UPPP, TURBINOPLASY    UPPER GASTROINTESTINAL ENDOSCOPY  2013    Bravo done, see note, hiatal hernia, Grade 1 erosive esophagitis    VARICOSE VEIN SURGERY Bilateral        Immunization History:   Immunization History   Administered Date(s) Administered    Influenza, Quadv, IM, (6 mo and older Fluzone, Flulaval, Fluarix and 3 yrs and older Afluria) 11/10/2016    Pneumococcal Conjugate 13-valent Artem Etienne) 04/25/2017    Tdap (Boostrix, Adacel) 06/15/2015       Active Problems:  Patient Active Problem List   Diagnosis Code    Chronic back pain M54.9, G89.29    GERD (gastroesophageal reflux disease) K21.9    Hyperlipidemia E78.5    Hypertension I10    Failed back syndrome M96.1    DDD (degenerative disc disease), lumbar M51.36    Lumbar facet joint pain M54.5    Lumbar radicular pain M54.16    DDD (degenerative disc disease), cervical M50.30    Cervical spondylosis  S/P Cervical fusion M47.812    Osteoarthritis of spine with radiculopathy, lumbar region M47.26    Lumbar facet arthropathy M47.816    Menopausal and postmenopausal disorder N95.9    Anemia, pernicious D51.0    Hiatal hernia K44.9    Anxiety F41.9    Unstable angina (HCC) I20.0    Essential hypertension I10    B12 deficiency E53.8    Varicose veins of both lower extremities I83.93    S/P lumbar fusion Z98.1    POONAM (obstructive sleep apnea) G47.33    Pure hypercholesterolemia E78.00    Deep vein thrombosis (DVT) of axillary vein of right upper extremity (HCC) I82. 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 A11    Long term current use of anticoagulant therapy Z79.01    Fracture of spine, cervical, without spinal cord injury, closed ([facility] [address] S12. 9XXA    Tendon tear T14. 8XXA    Chronic pain of both shoulders M25.511, G89.29, M25.512    Need for prophylactic vaccination with Streptococcus pneumoniae (Pneumococcus) and Influenza vaccines Z23    Arm DVT (deep venous thromboembolism), acute (HCC) I82.629    Sacroiliac joint disease M53.3    Cervical post-laminectomy syndrome M96.1    Myofascial pain M79.18    Chest pain R07.9    Subtherapeutic international normalized ratio (INR) R79.1       Isolation/Infection:   Isolation          No Isolation        Patient Infection Status     None to display          Nurse

## 2020-01-17 NOTE — PROGRESS NOTES
Physical Therapy  DATE: 2020    NAME: Elie Gray  MRN: [id number]   : 1950    Patient not seen this date for Physical Therapy due to:  [] Blood transfusion in progress  [] Hemodialysis  []  Patient Declined  [] Spine Precautions   [] Strict Bedrest  [] Surgery/ Procedure  [x] Testing: CT chest ordered to r/o PE per chart. PT will check back as time allows this PM or 20. [] Other        [] PT being discontinued at this time. Patient independent. No further needs. [] PT being discontinued at this time as the patient has been transferred to palliative care. No further needs.     Man Lombardo, PT

## 2020-01-17 NOTE — PLAN OF CARE
Problem: Falls - Risk of:  Goal: Will remain free from falls  Description  Will remain free from falls  1/17/2020 0024 by Ayah Mobley RN  Outcome: Ongoing  1/16/2020 1542 by Tonya Lambert RN  Outcome: Ongoing  Goal: Absence of physical injury  Description  Absence of physical injury  1/17/2020 0024 by Ayah Mobley RN  Outcome: Ongoing  1/16/2020 1542 by Tonya Lambert RN  Outcome: Ongoing     Problem: Cardiac:  Goal: Ability to maintain vital signs within normal range will improve  Description  Ability to maintain vital signs within normal range will improve  1/17/2020 0024 by Ayah Mobley RN  Outcome: Ongoing  1/16/2020 1542 by Tonya Lambert RN  Outcome: Ongoing  Goal: Cardiovascular alteration will improve  Description  Cardiovascular alteration will improve  1/17/2020 0024 by Ayah Mobley RN  Outcome: Ongoing  1/16/2020 1542 by Tonya Lambert RN  Outcome: Ongoing     Problem: Health Behavior:  Goal: Will modify at least one risk factor affecting health status  Description  Will modify at least one risk factor affecting health status  1/17/2020 0024 by Ayah Mobley RN  Outcome: Ongoing  1/16/2020 1542 by Tonya Lambert RN  Outcome: Ongoing  Goal: Identification of resources available to assist in meeting health care needs will improve  Description  Identification of resources available to assist in meeting health care needs will improve  1/17/2020 0024 by Ayah Mobley RN  Outcome: Ongoing  1/16/2020 1542 by Tonya Lambert RN  Outcome: Ongoing

## 2020-01-17 NOTE — PROGRESS NOTES
Occupational Therapy Not Seen Note    DATE: 2020  Name: Kajal Alberts  : 1950  MRN: [id number]    Patient not available for Occupational Therapy due to: Other: CT chest ordered to r/o PE per chart.  Check PM or 2020 as time allows    Next Scheduled Treatment: PM or 2020    Electronically signed by JAYME Shelby on 2020 at 11:36 AM

## 2020-01-17 NOTE — CARE COORDINATION
Case Management Initial Discharge Plan  Michael Peck,             Met with:patient to discuss discharge plans. Information verified: address, contacts, phone number, , insurance Yes  PCP: Nellie Shirley  Date of last visit: 1 month ago    Insurance Provider: [employer] medicare    Discharge Planning    Living Arrangements:  Spouse/Significant Other   Support Systems:  Spouse/Significant Other, Family Members    Home has 1 story  2 stairs to climb to get into front door    Patient able to perform ADL's:Independent    Current Services (outpatient & in home) none  DME equipment: cane, walker, wheel chair  DME provider:       Potential Assistance Needed:  N/A    Patient agreeable to home care: No  [facility] of choice provided:  n/a    Prior SNF/Rehab Placement and Facility: none  Agreeable to SNF/Rehab: No  [facility] of choice provided: n/a   Evaluation: n/a    Expected Discharge date:  20  Patient expects to be discharged to:  home   Follow Up Appointment: Best Day/ Time: Monday AM    Transportation provider:   Transportation arrangements needed for discharge: No    Readmission Risk              Risk of Unplanned Readmission:        13             Does patient have a readmission risk score greater than 14?: No  If yes, follow-up appointment must be made within 7 days of discharge.      Goals of Care: free of chest pain      Discharge Plan: home with           Electronically signed by Bryon Patricia RN on 20 at 7:39 PM

## 2020-01-18 ENCOUNTER — CARE COORDINATION (OUTPATIENT)
Dept: CASE MANAGEMENT | Age: 70
End: 2020-01-18

## 2020-01-18 NOTE — CARE COORDINATION
[facility] Transitions Initial Follow Up Call - one time call as per protocol - patient has [facility]    Call within 2 business days of discharge: Yes    Patient: Guerline Bryson Patient : 1950   MRN: [id number]  Reason for Admission: chest pain  Discharge Date: 20   RARS: Readmission Risk Score: 14      Last Discharge [facility]       Complaint Diagnosis Description Type Department Provider    1/15/20  Chronic low back pain, unspecified back pain laterality, unspecified whether sciatica present . .. Admission (Discharged) [facility] [address] Nessa Douglas MD           Spoke with: patient    Facility: [facility]  Non-face-to-face services provided:  Scheduled appointment with PCP-patient is calling to schedule within week  Obtained and reviewed discharge summary and/or continuity of care documents  Assessment and support for treatment adherence and medication management-reviewed medications with patient & she received all new medications from [facility] meds to beds     Denies further chest pain. Minimal chest pain. Has discomfort in spine & Flexeril is newly prescribed - patient will f/u at pain clinic. She has some lightheadedness which is not unusual for her & she holds her BP medication when this happens - she will take her low dose coreg later when her BP is back up. Lopressor was discontinued. Taking lovenox as prescribed - is used to taking since she is on coumadin chronically. Denies any other questions or concerns. .  Explained this is one time follow up call. Confirmed with [place] [facility] that initial skilled visit is scheduled for today.       Care Transitions 24 Hour Call    Do you have any ongoing symptoms?:  Yes  Patient-reported symptoms:  Pain, Other (Comment: some lightheadedness when BP is low)  Interventions for patient-reported symptoms:  Other (Comment: holding coreg until BP is higher)  Do you have a copy of your discharge instructions?:  Yes  Do you have all of your prescriptions and are they filled?:  Yes  Have you been contacted by a [address] [employer]?:  No  Have you scheduled your follow up appointment?:  No (Comment: patient will schedule)  Were you discharged with any Home Care or Post Acute Services:  No  Patient DME:  Straight cane, Other  Other Patient DME:  rollator walker  Patient Home Equipment:  Nebulizer  Do you have support at home?:  Partner/Spouse/SO  Do you feel like you have everything you need to keep you well at home?:  Yes  Are you an active caregiver in your home?:  No  Care Transitions Interventions                                   Vikram Cabral RN

## 2020-01-24 ENCOUNTER — HOSPITAL ENCOUNTER (OUTPATIENT)
Dept: PAIN MANAGEMENT | Age: 70
Discharge: HOME | End: 2020-01-24
Payer: MEDICARE

## 2020-01-24 ENCOUNTER — HOSPITAL ENCOUNTER (OUTPATIENT)
Dept: GENERAL RADIOLOGY | Age: 70
Discharge: HOME | End: 2020-01-26
Payer: MEDICARE

## 2020-01-24 VITALS
HEIGHT: 64 IN | DIASTOLIC BLOOD PRESSURE: 63 MMHG | SYSTOLIC BLOOD PRESSURE: 125 MMHG | TEMPERATURE: 98.6 F | WEIGHT: 180 LBS | RESPIRATION RATE: 18 BRPM | HEART RATE: 66 BPM | BODY MASS INDEX: 30.73 KG/M2 | OXYGEN SATURATION: 98 %
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
## 2020-01-24 PROCEDURE — 72050 X-RAY EXAM NECK SPINE 4/5VWS: CPT

## 2020-01-24 PROCEDURE — 99215 OFFICE O/P EST HI 40 MIN: CPT | Performed by: PAIN MEDICINE

## 2020-01-24 PROCEDURE — 99213 OFFICE O/P EST LOW 20 MIN: CPT

## 2020-01-24 RX ORDER — WARFARIN SODIUM 2.5 MG/1
TABLET ORAL
COMMUNITY
Start: 2019-12-01

## 2020-01-24 ASSESSMENT — PAIN DESCRIPTION - LOCATION: LOCATION: NECK;BACK

## 2020-01-24 ASSESSMENT — ENCOUNTER SYMPTOMS
GASTROINTESTINAL NEGATIVE: 1
EYES NEGATIVE: 1
ALLERGIC/IMMUNOLOGIC NEGATIVE: 1
RESPIRATORY NEGATIVE: 1
BACK PAIN: 1

## 2020-01-24 ASSESSMENT — PAIN DESCRIPTION - PAIN TYPE: TYPE: CHRONIC PAIN

## 2020-01-24 ASSESSMENT — PAIN DESCRIPTION - ORIENTATION: ORIENTATION: LEFT;RIGHT

## 2020-01-24 ASSESSMENT — PAIN DESCRIPTION - PROGRESSION: CLINICAL_PROGRESSION: GRADUALLY WORSENING

## 2020-01-24 ASSESSMENT — PAIN - FUNCTIONAL ASSESSMENT: PAIN_FUNCTIONAL_ASSESSMENT: PREVENTS OR INTERFERES SOME ACTIVE ACTIVITIES AND ADLS

## 2020-01-24 ASSESSMENT — PAIN DESCRIPTION - DESCRIPTORS: DESCRIPTORS: ACHING;RADIATING

## 2020-01-24 ASSESSMENT — PAIN DESCRIPTION - FREQUENCY: FREQUENCY: CONTINUOUS

## 2020-01-24 ASSESSMENT — PAIN DESCRIPTION - ONSET: ONSET: ON-GOING

## 2020-01-24 ASSESSMENT — PAIN SCALES - GENERAL: PAINLEVEL_OUTOF10: 4

## 2020-01-24 NOTE — PROGRESS NOTES
since onset. The pain is present in the lumbar spine and sacro-iliac. The quality of the pain is described as aching. The pain radiates to the left foot and right foot. The pain is at a severity of 6/10. The pain is moderate. The pain is the same all the time. The symptoms are aggravated by bending, position, twisting and coughing (ADLs, lifting and walking). Associated symptoms include chest pain and numbness. Pertinent negatives include no abdominal pain, bladder incontinence, bowel incontinence, dysuria, tingling or weakness. Risk factors include lack of exercise and sedentary lifestyle. RX Monitoring 1/26/2020   Attestation -   Periodic Controlled Substance Monitoring No signs of potential drug abuse or diversion identified. ;Assessed functional status. Chronic Pain > 80 MEDD -       Current Pain Assessment  Pain Assessment  Pain Assessment: 0-10  Pain Level: 4  Patient's Stated Pain Goal: 2(decrease pain and increase activity)  Pain Type: Chronic pain  Pain Location: Neck, Back  Pain Orientation: Left, Right  Pain Radiating Towards: bileral arms and bilateral legs  Pain Descriptors: Aching, Radiating  Pain Frequency: Continuous  Pain Onset: On-going  Clinical Progression: Gradually worsening  Effect of Pain on Daily Activities: pain increases with walking in legs and laying down hurts bilateral arms  Functional Pain Assessment: Prevents or interferes some active activities and ADLs  Non-Pharmaceutical Pain Intervention(s): Rest                       ADVERSE MEDICATION EFFECTS:   Constipation: no  Bowel Regimen: No  Diet: common adult  Appetite:  ok  Sedation:  no  Urinary Retention: no     FOCUSED PAINSCALE:  Highest : 10  Lowest :2  Average: Range-4  When and What  was your last procedure:    9/18/18, Caudal epidural steroid injection under   Was your procedure effective:  Yes, 40%     ACTIVITY/SOCIAL/EMOTIONAL:  Sleep Pattern: 3 hours per night.  difficulty falling asleep, nightime awakenings and SURGERY-NOW RESOLVED    Wears dentures     UPPER    Wears glasses        Surgical History  Past Surgical History:   Procedure Laterality Date    BACK SURGERY      LUMBAR UNSURE WHEN    BACK SURGERY  10/17/2016    hardware removal, revision posterior instrumentation    CERVICAL FUSION  2013    Anterior    COLONOSCOPY  7/16/2012    normal    HYSTERECTOMY  1986    TOTAL    SIGMOIDOSCOPY  6/20/2012    normal    SINUS SURGERY      SPINE SURGERY  2005    LUMBAR    TONSILLECTOMY  2002    UPPP, TURBINOPLASY    UPPER GASTROINTESTINAL ENDOSCOPY  8/14/2013    Bravo done, see note, hiatal hernia, Grade 1 erosive esophagitis    VARICOSE VEIN SURGERY Bilateral        Medications  Current Outpatient Medications   Medication Sig Dispense Refill    warfarin (COUMADIN) 2.5 MG tablet TAKE 2 TABLETS BY MOUTH ON SUNDAY AND THURSDAY, 1 AND 1/2 TABLETS ALL OTHER DAYS OR AS DIRECTED      carvedilol (COREG) 3.125 MG tablet Take 1 tablet by mouth 2 times daily (with meals) 60 tablet 3    cyclobenzaprine (FLEXERIL) 5 MG tablet Take 1 tablet by mouth 2 times daily as needed for Muscle spasms 10 tablet 0    nitroGLYCERIN (NITROSTAT) 0.4 MG SL tablet Place 0.4 mg under the tongue every 5 minutes as needed for Chest pain up to max of 3 total doses. If no relief after 1 dose, call 911.  Cyanocobalamin (VITAMIN B-12) 1000 MCG SUBL Take 1 tablet by mouth daily  11    calcium-vitamin D (OSCAL-500) 500-200 MG-UNIT per tablet Take 1 tablet by mouth 2 times daily      LORazepam (ATIVAN) 1 MG tablet Take 1 mg by mouth 3 times daily as needed for Anxiety.       omeprazole (PRILOSEC) 40 MG delayed release capsule Take 40 mg by mouth daily      warfarin (COUMADIN) 3 MG tablet Take 1 tablet by mouth daily 30 tablet 0    tiZANidine (ZANAFLEX) 4 MG tablet Take 1 tablet by mouth every 8 hours as needed (muscle spasms) 90 tablet 0    Blood Pressure Monitoring (BLOOD PRESSURE CUFF) MISC 1 each by Does not apply route 2 times daily 1 each 0    atorvastatin (LIPITOR) 20 MG tablet Take 1 tablet by mouth nightly 90 tablet 5    albuterol (PROVENTIL) (2.5 MG/3ML) 0.083% nebulizer solution INHALE THE CONTENTS OF 1 VIAL VIA NEBULIZER EVERY 6 HOURS AS NEEDED  FOR  WHEEZING (SUBSTITUTED FOR PROVENTIL) 360 mL 1     No current facility-administered medications for this encounter. Allergies  Xarelto [rivaroxaban]; Morphine; Seasonal; and Adhesive tape    Family History  family history includes Asthma in her brother; Cancer in her brother, brother, brother, father, maternal grandmother, and paternal grandmother; Heart Disease in her mother. Social History  Social History     Socioeconomic History    Marital status:      Spouse name: None    Number of children: None    Years of education: None    Highest education level: None   Occupational History    Occupation: Retired   Social Needs    Financial resource strain: None    Food insecurity:     Worry: None     Inability: None    Transportation needs:     Medical: None     Non-medical: None   Tobacco Use    Smoking status: Never Smoker    Smokeless tobacco: Never Used   Substance and Sexual Activity    Alcohol use: No    Drug use: No    Sexual activity: None   Lifestyle    Physical activity:     Days per week: None     Minutes per session: None    Stress: None   Relationships    Social connections:     Talks on phone: None     Gets together: None     Attends Protestant service: None     Active member of club or organization: None     Attends meetings of clubs or organizations: None     Relationship status: None    Intimate partner violence:     Fear of current or ex partner: None     Emotionally abused: None     Physically abused: None     Forced sexual activity: None   Other Topics Concern    None   Social History Narrative    None      reports no history of drug use. REVIEW OF SYSTEMS:  Review of Systems   Constitutional: Positive for fatigue.  Negative for activity change and appetite change. HENT: Negative for congestion and hearing loss. Eyes: Negative. Negative for photophobia, pain and redness. Respiratory: Negative. Negative for cough and shortness of breath. Cardiovascular: Positive for chest pain. Negative for palpitations. Gastrointestinal: Negative. Negative for abdominal distention, abdominal pain, bowel incontinence, constipation, nausea and vomiting. Endocrine: Negative. Negative for cold intolerance and polyuria. Genitourinary: Negative. Negative for bladder incontinence, dysuria and hematuria. Musculoskeletal: Positive for arthralgias, back pain, myalgias and neck pain. Skin: Negative. Negative for pallor and rash. Allergic/Immunologic: Negative. Neurological: Positive for numbness. Negative for tingling and weakness. Hematological: Bruises/bleeds easily. Psychiatric/Behavioral: Positive for sleep disturbance. Negative for self-injury and suicidal ideas. The patient is not nervous/anxious. GENERAL PHYSICAL EXAM:  Vitals: /63   Pulse 66   Temp 98.6 °F (37 °C) (Oral)   Resp 18   Ht 5' 4\" (1.626 m)   Wt 180 lb (81.6 kg)   LMP 10/05/1986 (Approximate)   SpO2 98%   BMI 30.90 kg/m² , Body mass index is 30.9 kg/m². Physical Exam  Constitutional:       Appearance: Normal appearance. She is well-developed. HENT:      Head: Normocephalic and atraumatic. Mouth/Throat:      Mouth: Mucous membranes are moist.   Eyes:      Extraocular Movements: Extraocular movements intact. Conjunctiva/sclera: Conjunctivae normal.      Pupils: Pupils are equal, round, and reactive to light. Neck:      Musculoskeletal: Normal range of motion and neck supple. Thyroid: No thyromegaly. Trachea: No tracheal deviation. Cardiovascular:      Rate and Rhythm: Normal rate and regular rhythm. Pulses: Normal pulses. Pulmonary:      Effort: Pulmonary effort is normal. No respiratory distress.    Abdominal: Sensation: normal  Gait: antalgic   Erythema: no back redness  Scars: present    Comments:  Skin --normal appearance  Surgical Scar --Present anterior cervical region  kyphosis absent and scoliosis absent  Alignment of her shoulders, scapulae and iliac crests--symmetric  Paraspinal tenderness:Present  Lumbar lordosis-----------Decreased  Movements of the lumbar spine indicated above are diminished range with pain   Facet loading---  : Right side--    Pain-Moderate                                   Left side----   Pain  Moderate            Nurses Notes and Vital Signs reviewed. DATA  Labs:  Benzodiazepines   Date Value Ref Range Status   10/15/2012 NEGATIVE NEG Final     Comment:           (Positive cutoff 200 ng/mL)                 Benzodiazepine Screen, Urine   Date Value Ref Range Status   09/03/2013 NEGATIVE NEG Final     Comment:           (Positive cutoff 200 ng/mL)                    Imaging:  Radiology Images and Reports reviewed where indicated and necessary  EXAMINATION:   3 VIEWS OF THE LUMBAR SPINE       10/19/2016 10:48 pm       COMPARISON:   10/17/2016       HISTORY:   ORDERING SYSTEM PROVIDED HISTORY: crack in back   TECHNOLOGIST PROVIDED HISTORY:   Reason for exam:->crack in back   Reason for exam:->lateral and AP, do not need flexion or extension       FINDINGS:   Posterior lumbar fusion is identified from L2 through S1.  There is   additional fusion of left SI joint.  Posterior skin staples are noted.       The alignment is intact.  No acute fracture is identified.  Moderate diffuse   degenerative disc disease is noted       Laminectomy changes are identified at L4           Impression   Uncomplicated posterior fusion from L2 through S1 as well as the left SI   joint.  No significant change is evident.         Dorsal spine x-ray in 3 views dated 4/11/2016.       Clinical indication: Status post fall.       Comparison: None.       Findings:       No acute fracture or dislocation.  Vertebral heights are maintained. Normal alignment of the thoracic spine. Multiple levels of narrowing of the intervertebral disc spaces with small anterolateral spur formation. Postsurgical fusion changes are seen in    the lower cervical spine. Soft tissues are unremarkable.       Impression:   1. No acute fracture or dislocation. 2. Degenerative changes.       Final report electronically signed by Sharia Meigs, M.D. on 4/11/2016        Patient Active Problem List   Diagnosis    Chronic back pain    GERD (gastroesophageal reflux disease)    Hyperlipidemia    Hypertension    Failed back syndrome    DDD (degenerative disc disease), lumbar    Lumbar facet joint pain    Lumbar radicular pain    DDD (degenerative disc disease), cervical    Cervical spondylosis  S/P Cervical fusion    Osteoarthritis of spine with radiculopathy, lumbar region    Lumbar facet arthropathy    Menopausal and postmenopausal disorder    Anemia, pernicious    Hiatal hernia    Anxiety    Unstable angina (HCC)    Essential hypertension    B12 deficiency    Varicose veins of both lower extremities    S/P lumbar fusion    POONAM (obstructive sleep apnea)    Pure hypercholesterolemia    Deep vein thrombosis (DVT) of axillary vein of right upper extremity (HCC)    Paget-Schroetter syndrome    Acute deep vein thrombosis (DVT) of axillary vein of right upper extremity ([address]    Long term current use of anticoagulant therapy    Fracture of spine, cervical, without spinal cord injury, closed ([address]    Tendon tear    Chronic pain of both shoulders    Need for prophylactic vaccination with Streptococcus pneumoniae (Pneumococcus) and Influenza vaccines    Arm DVT (deep venous thromboembolism), acute (HCC)    Sacroiliac joint disease    Cervical post-laminectomy syndrome    Myofascial pain    Chest pain    Subtherapeutic international normalized ratio (INR)    Nosocomial condition        ASSESSMENT    Janie Kanner is a 78 y.o. female with     1. Lumbar radicular pain    2. Lumbar facet arthropathy    3. DDD (degenerative disc disease), lumbar    4. Lumbar facet joint pain    5. DDD (degenerative disc disease), cervical    6. Cervical spondylolysis    7. S/P cervical spinal fusion    8. Osteoarthritis of spine with radiculopathy, lumbar region           PLAN  Patient's   [] x-ray    [] CT scan    [x] MRI  Were/was  Reviewed. These findings are consistent with the patient's symptoms and physical examination. [x] Patient's findings on the x-ray were explained to the patient using a bone modal.    Other reports reviewed include    [] Bone scan   [] EMG and nerve conduction studies   [x] Referral reports-  I also discussed with him the following treatment options Including advantages and disadvantages of each:    [x] Physical therapy    [x] Interventional pain treatment    [] Medication management    [] Surgical options    Patient's OARRS were reviewed. It is acceptable and appears patient is not receiving prescriptions from multiple prescribers. Patient is  forthcoming regarding prescriptions for pain medication in the past  Controlled Substances Monitoring: Periodic Controlled Substance Monitoring: No signs of potential drug abuse or diversion identified. , Assessed functional status. (Danita Avina MD)      Counselling/Preventive measures for pain  Control:    [x]  Spine strengthening exercises are discussed with patient in detail. The following treatment plan was developed after discussion with patient:    We discussed caudal epidural steroid injection  Patient tried and failed NSAIDS,Home exercises, Physical Therapy, Chiropractic manipulations without relief. Patient exhibited signs of radiculopathy with positive straight leg raising test on bilaterally    Patient has not had prior Lumbar Surgery.   And had good pain relief following caudal epidural steroid injections in the past  Also obtain x-ray of the cervical spine as she had cervical fusion and he is complaining of increased neck pain to rule out any pain during her hardware. Orders Placed This Encounter   Procedures    Lumbar Epidural Steroid Injection/Caudal     Standing Status:   Future     Standing Expiration Date:   1/24/2021    XR CERVICAL SPINE (4-5 VIEWS)     Pt. Had a fall  In Dec with increased pain and numbness in hands     Standing Status:   Future     Number of Occurrences:   1     Standing Expiration Date:   1/24/2021   [facility] For Surgical Procedures     Standing Status:   Future     Standing Expiration Date:   1/24/2021     Order Specific Question:   Laterality     Answer:   Bilateral    Protime-INR     Standing Status:   Future     Standing Expiration Date:   7/24/2020     Order Specific Question:   Daily Coumadin Dose? Answer:   2.5    Saline lock IV     Standing Status:   Future     Standing Expiration Date:   7/24/2021       Decision Making Process : Patient's health history and referral records thoroughly reviewed before focused physical examination and discussion with patient. Over 50% of today's visit is spent on examining the patient and counseling. Level of complexity of date to be reviewed is Moderate. The chart date reviewed include the following: Imaging Reports. Summary of Care. Time spent reviewing with patient the below reports:   Medication safety, Treatment options. Level of diagnosis and management options of this case is multiple: involving the following management options: Interventions as needed, medication management as appropriate, future visits, activity modification, heat/ice as needed, Urine drug screen as required. [x]The patient's questions were answered to the best of my abilities. This note was created using voice recognition software. There may be inaccuracies of transcription  that are inadvertently overlooked prior to the signature.   There is any questions about the transcription please contact me.    Electronically signed by Danita Avina MD on 1/26/2020 at 10:16 PM

## 2020-01-24 NOTE — RESULT ENCOUNTER NOTE
1/24/2020 2:32 pm     COMPARISON:  Cervical spine radiographs 04/11/2016     HISTORY:  ORDERING SYSTEM PROVIDED HISTORY: S/P cervical spinal fusion  TECHNOLOGIST PROVIDED HISTORY:  Pt. Had a fall  In Dec with increased pain and numbness in hands  Reason for Exam: fall in december. increased pain and numbness in hands     FINDINGS:  All 7 cervical vertebrae are visualized and are normal in height and  alignment. There are postsurgical changes from C6-C7 discectomy and anterior  plate and screw fixation. Hardware is intact and in unchanged position. There is osseous fusion across the C6-C7 disc space. Base of the odontoid is  intact. Normal atlantoaxial alignment. Mild multilevel disc narrowing and  endplate osteophyte formation and mild multilevel uncovertebral and facet  joint degenerative changes are similar to prior. No acute fracture.     IMPRESSION:  Status post C6-C7 discectomy and anterior fixation. No radiographic evidence  of hardware complication.     No acute osseous abnormality. Multilevel degenerative changes of the  cervical spine are similar to prior.   Electronically signed by Nasir Wyatt MD on 1/24/2020 at 3:59 PM

## 2020-01-26 ASSESSMENT — ENCOUNTER SYMPTOMS
NAUSEA: 0
EYE PAIN: 0
PHOTOPHOBIA: 0
ABDOMINAL PAIN: 0
BOWEL INCONTINENCE: 0
COUGH: 0
VOMITING: 0
ABDOMINAL DISTENTION: 0
CONSTIPATION: 0
EYE REDNESS: 0
SHORTNESS OF BREATH: 0

## 2020-02-17 ENCOUNTER — HOSPITAL ENCOUNTER (OUTPATIENT)
Age: 70
Discharge: HOME | End: 2020-02-17
Payer: MEDICARE

## 2020-02-17 ENCOUNTER — HOSPITAL ENCOUNTER (OUTPATIENT)
Dept: PAIN MANAGEMENT | Age: 70
Discharge: HOME | End: 2020-02-17
Payer: MEDICARE

## 2020-02-17 ENCOUNTER — HOSPITAL ENCOUNTER (OUTPATIENT)
Dept: GENERAL RADIOLOGY | Age: 70
Discharge: HOME | End: 2020-02-19
Payer: MEDICARE

## 2020-02-17 VITALS
BODY MASS INDEX: 30.73 KG/M2 | HEIGHT: 64 IN | WEIGHT: 180 LBS | TEMPERATURE: 98.4 F | RESPIRATION RATE: 18 BRPM | DIASTOLIC BLOOD PRESSURE: 76 MMHG | SYSTOLIC BLOOD PRESSURE: 138 MMHG | OXYGEN SATURATION: 98 % | HEART RATE: 77 BPM

## 2020-02-17 LAB
INR BLD: 1.1
PROTHROMBIN TIME: 13.8 SEC (ref 11.8–14.6)

## 2020-02-17 PROCEDURE — 3209999900 FLUORO FOR SURGICAL PROCEDURES

## 2020-02-17 PROCEDURE — 6360000002 HC RX W HCPCS: Performed by: PAIN MEDICINE

## 2020-02-17 PROCEDURE — 36415 COLL VENOUS BLD VENIPUNCTURE: CPT

## 2020-02-17 PROCEDURE — 62323 NJX INTERLAMINAR LMBR/SAC: CPT

## 2020-02-17 PROCEDURE — 85610 PROTHROMBIN TIME: CPT

## 2020-02-17 PROCEDURE — 6360000004 HC RX CONTRAST MEDICATION: Performed by: PAIN MEDICINE

## 2020-02-17 PROCEDURE — 62323 NJX INTERLAMINAR LMBR/SAC: CPT | Performed by: PAIN MEDICINE

## 2020-02-17 RX ORDER — FENTANYL CITRATE 50 UG/ML
INJECTION, SOLUTION INTRAMUSCULAR; INTRAVENOUS
Status: COMPLETED | OUTPATIENT
Start: 2020-02-17 | End: 2020-02-17

## 2020-02-17 RX ORDER — TRIAMCINOLONE ACETONIDE 40 MG/ML
INJECTION, SUSPENSION INTRA-ARTICULAR; INTRAMUSCULAR
Status: COMPLETED | OUTPATIENT
Start: 2020-02-17 | End: 2020-02-17

## 2020-02-17 RX ORDER — MIDAZOLAM HYDROCHLORIDE 1 MG/ML
INJECTION INTRAMUSCULAR; INTRAVENOUS
Status: COMPLETED | OUTPATIENT
Start: 2020-02-17 | End: 2020-02-17

## 2020-02-17 RX ADMIN — Medication 50 MCG: at 09:59

## 2020-02-17 RX ADMIN — TRIAMCINOLONE ACETONIDE 80 MG: 40 INJECTION, SUSPENSION INTRA-ARTICULAR; INTRAMUSCULAR at 10:02

## 2020-02-17 RX ADMIN — IOHEXOL 3 ML: 180 INJECTION INTRAVENOUS at 10:02

## 2020-02-17 RX ADMIN — MIDAZOLAM 2 MG: 1 INJECTION INTRAMUSCULAR; INTRAVENOUS at 09:45

## 2020-02-17 ASSESSMENT — PAIN DESCRIPTION - PROGRESSION: CLINICAL_PROGRESSION: GRADUALLY WORSENING

## 2020-02-17 ASSESSMENT — PAIN SCALES - GENERAL
PAINLEVEL_OUTOF10: 4
PAINLEVEL_OUTOF10: 5

## 2020-02-17 ASSESSMENT — PAIN DESCRIPTION - DESCRIPTORS: DESCRIPTORS: ACHING;STABBING

## 2020-02-17 ASSESSMENT — PAIN DESCRIPTION - FREQUENCY: FREQUENCY: CONTINUOUS

## 2020-02-17 ASSESSMENT — PAIN DESCRIPTION - DIRECTION: RADIATING_TOWARDS: BILAT LEGS

## 2020-02-17 ASSESSMENT — PAIN - FUNCTIONAL ASSESSMENT
PAIN_FUNCTIONAL_ASSESSMENT: PREVENTS OR INTERFERES SOME ACTIVE ACTIVITIES AND ADLS
PAIN_FUNCTIONAL_ASSESSMENT: 0-10

## 2020-02-17 ASSESSMENT — PAIN DESCRIPTION - LOCATION: LOCATION: BACK;HIP;LEG

## 2020-02-17 ASSESSMENT — PAIN DESCRIPTION - ORIENTATION: ORIENTATION: RIGHT;LEFT

## 2020-02-17 ASSESSMENT — PAIN DESCRIPTION - ONSET: ONSET: ON-GOING

## 2020-02-17 ASSESSMENT — PAIN DESCRIPTION - PAIN TYPE: TYPE: CHRONIC PAIN

## 2020-02-17 NOTE — PROCEDURES
hiatus into the sacral canal. Then the epidural catheter was passed through the needle into the scaral epidural space as cephalad as possible. This was confirmed with AP and lateral views using fluoroscopy after injecting about 3 ml of Omnipaque-180 and observing the spread of the contrast in the epidural space. Then after negative aspiration a total of 80 mg of triamcinolone with 8 ml of normal saline was injected into the epidural space. The needle is removed and a Band-Aid was placed over the needle insertion site. Patient's vital signs remained stable and the patient tolerated the procedure well. Patient was discharged home in stable condition and will be followed in the pain clinic in the next few weeks for further planning.     @delta@  Shaila Horton MD  2/17/2020

## 2020-02-18 ENCOUNTER — TELEPHONE (OUTPATIENT)
Dept: PAIN MANAGEMENT | Age: 70
End: 2020-02-18

## 2020-03-19 ENCOUNTER — TELEPHONE (OUTPATIENT)
Dept: PAIN MANAGEMENT | Age: 70
End: 2020-03-19

## 2020-07-01 ENCOUNTER — HOSPITAL ENCOUNTER (EMERGENCY)
Age: 70
Discharge: HOME | End: 2020-07-01
Payer: MEDICARE

## 2020-07-01 ENCOUNTER — APPOINTMENT (OUTPATIENT)
Dept: CT IMAGING | Age: 70
End: 2020-07-01
Payer: MEDICARE

## 2020-07-01 VITALS
HEIGHT: 64 IN | BODY MASS INDEX: 33.29 KG/M2 | HEART RATE: 88 BPM | TEMPERATURE: 98 F | DIASTOLIC BLOOD PRESSURE: 78 MMHG | RESPIRATION RATE: 18 BRPM | OXYGEN SATURATION: 96 % | WEIGHT: 195 LBS | SYSTOLIC BLOOD PRESSURE: 174 MMHG

## 2020-07-01 LAB
ABSOLUTE EOS #: 0.07 K/UL (ref 0–0.44)
ABSOLUTE IMMATURE GRANULOCYTE: 0.03 K/UL (ref 0–0.3)
ABSOLUTE LYMPH #: 1.65 K/UL (ref 1.1–3.7)
ABSOLUTE MONO #: 0.56 K/UL (ref 0.1–1.2)
ALBUMIN SERPL-MCNC: 4.2 G/DL (ref 3.5–5.2)
ALBUMIN/GLOBULIN RATIO: 1.6 (ref 1–2.5)
ALP BLD-CCNC: 94 U/L (ref 35–104)
ALT SERPL-CCNC: 16 U/L (ref 5–33)
AMYLASE: 52 U/L (ref 28–100)
ANION GAP SERPL CALCULATED.3IONS-SCNC: 12 MMOL/L (ref 9–17)
AST SERPL-CCNC: 23 U/L
BASOPHILS # BLD: 1 % (ref 0–2)
BASOPHILS ABSOLUTE: 0.05 K/UL (ref 0–0.2)
BILIRUB SERPL-MCNC: 0.33 MG/DL (ref 0.3–1.2)
BUN BLDV-MCNC: 9 MG/DL (ref 8–23)
BUN/CREAT BLD: 18 (ref 9–20)
CALCIUM SERPL-MCNC: 8.9 MG/DL (ref 8.6–10.4)
CHLORIDE BLD-SCNC: 104 MMOL/L (ref 98–107)
CO2: 24 MMOL/L (ref 20–31)
CREAT SERPL-MCNC: 0.51 MG/DL (ref 0.5–0.9)
DIFFERENTIAL TYPE: ABNORMAL
EOSINOPHILS RELATIVE PERCENT: 1 % (ref 1–4)
GFR AFRICAN AMERICAN: >60 ML/MIN
GFR NON-AFRICAN AMERICAN: >60 ML/MIN
GFR SERPL CREATININE-BSD FRML MDRD: ABNORMAL ML/MIN/{1.73_M2}
GFR SERPL CREATININE-BSD FRML MDRD: ABNORMAL ML/MIN/{1.73_M2}
GLUCOSE BLD-MCNC: 127 MG/DL (ref 70–99)
HCT VFR BLD CALC: 42.2 % (ref 36.3–47.1)
HEMOGLOBIN: 13.5 G/DL (ref 11.9–15.1)
IMMATURE GRANULOCYTES: 0 %
INR BLD: 2.1
LIPASE: 31 U/L (ref 13–60)
LYMPHOCYTES # BLD: 23 % (ref 24–43)
MCH RBC QN AUTO: 29.5 PG (ref 25.2–33.5)
MCHC RBC AUTO-ENTMCNC: 32 G/DL (ref 28.4–34.8)
MCV RBC AUTO: 92.1 FL (ref 82.6–102.9)
MONOCYTES # BLD: 8 % (ref 3–12)
NRBC AUTOMATED: 0 PER 100 WBC
PARTIAL THROMBOPLASTIN TIME: 34.3 SEC (ref 24–36)
PDW BLD-RTO: 14 % (ref 11.8–14.4)
PLATELET # BLD: 220 K/UL (ref 138–453)
PLATELET ESTIMATE: ABNORMAL
PMV BLD AUTO: 9.4 FL (ref 8.1–13.5)
POTASSIUM SERPL-SCNC: 3.6 MMOL/L (ref 3.7–5.3)
PROTHROMBIN TIME: 23.6 SEC (ref 11.8–14.6)
RBC # BLD: 4.58 M/UL (ref 3.95–5.11)
RBC # BLD: ABNORMAL 10*6/UL
SEG NEUTROPHILS: 67 % (ref 36–65)
SEGMENTED NEUTROPHILS ABSOLUTE COUNT: 4.83 K/UL (ref 1.5–8.1)
SODIUM BLD-SCNC: 140 MMOL/L (ref 135–144)
TOTAL PROTEIN: 6.9 G/DL (ref 6.4–8.3)
WBC # BLD: 7.2 K/UL (ref 3.5–11.3)
WBC # BLD: ABNORMAL 10*3/UL

## 2020-07-01 PROCEDURE — 82150 ASSAY OF AMYLASE: CPT

## 2020-07-01 PROCEDURE — 2580000003 HC RX 258: Performed by: NURSE PRACTITIONER

## 2020-07-01 PROCEDURE — 99284 EMERGENCY DEPT VISIT MOD MDM: CPT

## 2020-07-01 PROCEDURE — 85610 PROTHROMBIN TIME: CPT

## 2020-07-01 PROCEDURE — 6360000002 HC RX W HCPCS: Performed by: NURSE PRACTITIONER

## 2020-07-01 PROCEDURE — 74177 CT ABD & PELVIS W/CONTRAST: CPT

## 2020-07-01 PROCEDURE — 80053 COMPREHEN METABOLIC PANEL: CPT

## 2020-07-01 PROCEDURE — 85025 COMPLETE CBC W/AUTO DIFF WBC: CPT

## 2020-07-01 PROCEDURE — 96374 THER/PROPH/DIAG INJ IV PUSH: CPT

## 2020-07-01 PROCEDURE — 83690 ASSAY OF LIPASE: CPT

## 2020-07-01 PROCEDURE — 85730 THROMBOPLASTIN TIME PARTIAL: CPT

## 2020-07-01 PROCEDURE — 6360000004 HC RX CONTRAST MEDICATION: Performed by: NURSE PRACTITIONER

## 2020-07-01 RX ORDER — KETOROLAC TROMETHAMINE 15 MG/ML
15 INJECTION, SOLUTION INTRAMUSCULAR; INTRAVENOUS ONCE
Status: COMPLETED | OUTPATIENT
Start: 2020-07-01 | End: 2020-07-01

## 2020-07-01 RX ORDER — 0.9 % SODIUM CHLORIDE 0.9 %
1000 INTRAVENOUS SOLUTION INTRAVENOUS ONCE
Status: COMPLETED | OUTPATIENT
Start: 2020-07-01 | End: 2020-07-01

## 2020-07-01 RX ADMIN — SODIUM CHLORIDE 1000 ML: 9 INJECTION, SOLUTION INTRAVENOUS at 14:25

## 2020-07-01 RX ADMIN — IOPAMIDOL 75 ML: 755 INJECTION, SOLUTION INTRAVENOUS at 15:21

## 2020-07-01 RX ADMIN — KETOROLAC TROMETHAMINE 15 MG: 15 INJECTION, SOLUTION INTRAMUSCULAR; INTRAVENOUS at 16:01

## 2020-07-01 ASSESSMENT — PAIN DESCRIPTION - DESCRIPTORS
DESCRIPTORS: SHARP
DESCRIPTORS: STABBING;SHARP;PRESSURE

## 2020-07-01 ASSESSMENT — PAIN DESCRIPTION - FREQUENCY
FREQUENCY: CONTINUOUS
FREQUENCY: CONTINUOUS

## 2020-07-01 ASSESSMENT — PAIN SCALES - GENERAL
PAINLEVEL_OUTOF10: 10

## 2020-07-01 ASSESSMENT — PAIN DESCRIPTION - LOCATION
LOCATION: ABDOMEN
LOCATION: ABDOMEN

## 2020-07-01 ASSESSMENT — PAIN DESCRIPTION - ORIENTATION
ORIENTATION: LEFT
ORIENTATION: LEFT;UPPER;LOWER

## 2020-07-01 ASSESSMENT — PAIN DESCRIPTION - PAIN TYPE: TYPE: ACUTE PAIN

## 2020-07-01 NOTE — ED PROVIDER NOTES
[facility] ED  EMERGENCY DEPARTMENT ENCOUNTER      Pt Name: Maty Moore  MRN: [id number]  [place] 1950  Date of evaluation: 7/1/2020  Provider: CHRISTIAN Garvey [id number]       Chief Complaint   Patient presents with    Abdominal Pain     Left sided ongoing for approx 2 weeks and worsening         HISTORY OF PRESENT ILLNESS   (Location/Symptom, Timing/Onset, Context/Setting, Quality, Duration, Modifying Factors, Severity)  Note limiting factors. Maty Moore is a 78 y.o. female who presents to the emergency department for a complaint of left-sided abdominal pain is been ongoing for approximately 2 weeks. The patient reports that the pain is intermittent in nature but currently present. She rates the pain a 10 out of 10 on the pain scale. She denies any diarrhea or vomiting. She reports occasional nausea. Patient denies fevers or chills. Nursing Notes were reviewed. REVIEW OF SYSTEMS    (2-9 systems for level 4, 10 or more for level 5)       Constitutional: Negative for fever, chills  Skin: Negative for rash, itching  HENT: Negative for sore throat, rhinorrhea and sinus pain. Eyes: Negative for eye discharge, eye redness  Cardiovascular: Negative for chest pain, dyspnea on exertion  Respiratory: Negative for cough, shortness of breath, wheezing or stridor. Gastrointestinal: see HPI  Genitourinary: Negative for bladder incontinence, dysuria, frequency. Musculoskeletal: Negative for myalgias, back pain, falls. Neurological: Negative for tingling, headaches. Except as noted above the remainder of the review of systems was reviewed and negative.        PAST MEDICAL HISTORY     Past Medical History:   Diagnosis Date    Anxiety     Arthritis     Asthma     Blood circulation, collateral     CAD (coronary artery disease)     Chronic back pain     Depression     YEARS AGO NO RECENT PROBLEMS    Difficult intravenous access     AT TIMES    Disease of blood and blood forming organ     Diverticulitis     D[facility] (deep venous thrombosis) ([facility])     Right upper arm    Dyslexia     GERD (gastroesophageal reflux disease) 2014    ON RX    Headache(784.0)     Hx of blood clots 2001    LT UPPER LEG TREATED WITH INJECTIONS THEN ORAL BLOOD THINNER FOR 1 YR    Hyperlipidemia 2014    ON RX    Hypertension 2014    ON RX    Migraine     Mitral valve prolapse 1970    NO SYMPTOMS    Movement disorder     Neuromuscular disorder ([address]     Sleep apnea 2006    ON MACHINE HAD SURGERY-NOW RESOLVED    Wears dentures     UPPER    Wears glasses          SURGICAL HISTORY       Past Surgical History:   Procedure Laterality Date    BACK SURGERY      LUMBAR UNSURE WHEN    BACK SURGERY  10/17/2016    hardware removal, revision posterior instrumentation    CERVICAL FUSION  2013    Anterior    COLONOSCOPY  7/16/2012    normal    HYSTERECTOMY  1986    TOTAL    SIGMOIDOSCOPY  6/20/2012    normal    SINUS SURGERY      SKIN CANCER EXCISION  06/2020    SPINE SURGERY  2005    LUMBAR    TONSILLECTOMY  2002    UPPP, TURBINOPLASY    UPPER GASTROINTESTINAL ENDOSCOPY  8/14/2013    Bravo done, see note, hiatal hernia, Grade 1 erosive esophagitis    VARICOSE VEIN SURGERY Bilateral          CURRENT MEDICATIONS       Current Discharge Medication List      CONTINUE these medications which have NOT CHANGED    Details   !! warfarin (COUMADIN) 2.5 MG tablet TAKE 2 TABLETS BY MOUTH ON SUNDAY AND THURSDAY, 1 AND 1/2 TABLETS ALL OTHER DAYS OR AS DIRECTED      carvedilol (COREG) 3.125 MG tablet Take 1 tablet by mouth 2 times daily (with meals)  Qty: 60 tablet, Refills: 3      Cyanocobalamin (VITAMIN B-12) 1000 MCG SUBL Take 1 tablet by mouth daily  Refills: 11      calcium-vitamin D (OSCAL-500) 500-200 MG-UNIT per tablet Take 1 tablet by mouth 2 times daily      LORazepam (ATIVAN) 1 MG tablet Take 1 mg by mouth 3 times daily as needed for Anxiety.       omeprazole (PRILOSEC) 40 MG delayed release capsule Take 40 mg by mouth daily      !! warfarin (COUMADIN) 3 MG tablet Take 1 tablet by mouth daily  Qty: 30 tablet, Refills: 0      atorvastatin (LIPITOR) 20 MG tablet Take 1 tablet by mouth nightly  Qty: 90 tablet, Refills: 5    Associated Diagnoses: Pure hypercholesterolemia      albuterol (PROVENTIL) (2.5 MG/3ML) 0.083% nebulizer solution INHALE THE CONTENTS OF 1 VIAL VIA NEBULIZER EVERY 6 HOURS AS NEEDED  FOR  WHEEZING (SUBSTITUTED FOR PROVENTIL)  Qty: 360 mL, Refills: 1    Associated Diagnoses: Bronchitis      nitroGLYCERIN (NITROSTAT) 0.4 MG SL tablet Place 0.4 mg under the tongue every 5 minutes as needed for Chest pain up to max of 3 total doses. If no relief after 1 dose, call 911. tiZANidine (ZANAFLEX) 4 MG tablet Take 1 tablet by mouth every 8 hours as needed (muscle spasms)  Qty: 90 tablet, Refills: 0    Comments: Fill 4-29-19      Blood Pressure Monitoring (BLOOD PRESSURE CUFF) MISC 1 each by Does not apply route 2 times daily  Qty: 1 each, Refills: 0       !! - Potential duplicate medications found. Please discuss with provider. ALLERGIES     Latex; Xarelto [rivaroxaban];  Morphine; Seasonal; and Adhesive tape    FAMILY HISTORY       Family History   Problem Relation Age of Onset    Heart Disease Mother     Cancer Father         prostate    Cancer Brother         leukemia    Cancer Maternal Grandmother         colon    Cancer Paternal Grandmother         colon    Cancer Brother         PROSTATE    Cancer Brother         LUNG    Asthma Brother           SOCIAL HISTORY       Social History     Socioeconomic History    Marital status:      Spouse name: None    Number of children: None    Years of education: None    Highest education level: None   Occupational History    Occupation: Retired   Social Needs    Financial resource strain: None    Food insecurity     Worry: None     Inability: None    Transportation needs     Medical: None     Non-medical: None   Tobacco Neurological: Alert and oriented. Skin: Skin is warm and dry. No rash noted. No cyanosis or erythema. No pallor. Nails show no clubbing. Psychiatric: Mood, memory, affect and judgment normal.  Exhibits ordered thought content. Affect appears normal.    DIAGNOSTIC RESULTS     EKG: All EKG's are interpreted by the Emergency Department Physician who either signs or Co-signs this chart in the absence of a cardiologist.    RADIOLOGY:   Non-plain film images such as CT, Ultrasound and MRI are read by the radiologist. Plain radiographic images are visualized and preliminarily interpreted by the emergency physician with the below findings:    Interpretation per the Radiologist below, if available at the time of this note:    CT ABDOMEN PELVIS W IV CONTRAST Additional Contrast? None   Final Result   1. No acute abdominal process. Hepatic steatosis. 2. Sigmoid diverticulosis with no CT evidence of acute diverticulitis.              ED BEDSIDE ULTRASOUND:   Performed by ED Physician - none    LABS:  Results for orders placed or performed during the hospital encounter of 07/01/20   CBC Auto Differential   Result Value Ref Range    WBC 7.2 3.5 - 11.3 k/uL    RBC 4.58 3.95 - 5.11 m/uL    Hemoglobin 13.5 11.9 - 15.1 g/dL    Hematocrit 42.2 36.3 - 47.1 %    MCV 92.1 82.6 - 102.9 fL    MCH 29.5 25.2 - 33.5 pg    MCHC 32.0 28.4 - 34.8 g/dL    RDW 14.0 11.8 - 14.4 %    Platelets 896 875 - 476 k/uL    MPV 9.4 8.1 - 13.5 fL    NRBC Automated 0.0 0.0 per 100 WBC    Differential Type NOT REPORTED     Seg Neutrophils 67 (H) 36 - 65 %    Lymphocytes 23 (L) 24 - 43 %    Monocytes 8 3 - 12 %    Eosinophils % 1 1 - 4 %    Basophils 1 0 - 2 %    Immature Granulocytes 0 0 %    Segs Absolute 4.83 1.50 - 8.10 k/uL    Absolute Lymph # 1.65 1.10 - 3.70 k/uL    Absolute Mono # 0.56 0.10 - 1.20 k/uL    Absolute Eos # 0.07 0.00 - 0.44 k/uL    Basophils Absolute 0.05 0.00 - 0.20 k/uL    Absolute Immature Granulocyte 0.03 0.00 - 0.30 k/uL    WBC

## 2020-11-03 PROBLEM — I10 HYPERTENSION: Status: RESOLVED | Noted: 2020-11-03 | Resolved: 2020-11-03

## 2021-02-02 ENCOUNTER — HOSPITAL ENCOUNTER (OUTPATIENT)
Dept: PAIN MANAGEMENT | Age: 71
Discharge: HOME | End: 2021-02-02
Payer: MEDICARE

## 2021-02-02 DIAGNOSIS — Z98.1 STATUS POST LUMBAR SPINAL FUSION: ICD-10-CM

## 2021-02-02 DIAGNOSIS — M50.30 DDD (DEGENERATIVE DISC DISEASE), CERVICAL: ICD-10-CM

## 2021-02-02 DIAGNOSIS — M96.1 FAILED BACK SYNDROME: ICD-10-CM

## 2021-02-02 DIAGNOSIS — M54.16 LUMBAR RADICULAR PAIN: ICD-10-CM

## 2021-02-02 DIAGNOSIS — M47.816 LUMBAR FACET ARTHROPATHY: ICD-10-CM

## 2021-02-02 DIAGNOSIS — M54.59 LUMBAR FACET JOINT PAIN: ICD-10-CM

## 2021-02-02 DIAGNOSIS — M47.26 OSTEOARTHRITIS OF SPINE WITH RADICULOPATHY, LUMBAR REGION: ICD-10-CM

## 2021-02-02 DIAGNOSIS — R52 ENCOUNTER FOR PAIN MANAGEMENT: ICD-10-CM

## 2021-02-02 DIAGNOSIS — M54.12 CERVICAL RADICULOPATHY: ICD-10-CM

## 2021-02-02 DIAGNOSIS — Z79.899 ENCOUNTER FOR MEDICATION MANAGEMENT: ICD-10-CM

## 2021-02-02 DIAGNOSIS — Z98.1 S/P CERVICAL SPINAL FUSION: ICD-10-CM

## 2021-02-02 DIAGNOSIS — M43.02 CERVICAL SPONDYLOLYSIS: Primary | ICD-10-CM

## 2021-02-02 DIAGNOSIS — M51.36 DDD (DEGENERATIVE DISC DISEASE), LUMBAR: ICD-10-CM

## 2021-02-02 PROCEDURE — 99213 OFFICE O/P EST LOW 20 MIN: CPT

## 2021-02-02 PROCEDURE — 99443 PR PHYS/QHP TELEPHONE EVALUATION 21-30 MIN: CPT | Performed by: PAIN MEDICINE

## 2021-02-02 ASSESSMENT — ENCOUNTER SYMPTOMS
BACK PAIN: 1
GASTROINTESTINAL NEGATIVE: 1
BOWEL INCONTINENCE: 0
EYE DISCHARGE: 0
EYES NEGATIVE: 1
EYE REDNESS: 0
SINUS PAIN: 0
ALLERGIC/IMMUNOLOGIC NEGATIVE: 1
ABDOMINAL DISTENTION: 0
CONSTIPATION: 0
SHORTNESS OF BREATH: 0
COUGH: 0
EYE PAIN: 0
RESPIRATORY NEGATIVE: 1
PHOTOPHOBIA: 0
ABDOMINAL PAIN: 0
VOMITING: 0
NAUSEA: 0

## 2021-02-02 NOTE — PROGRESS NOTES
Marvin Duffy is a 73 y.o. female evaluated on 2/2/2021. Modality of virtual service provided -via  telephone   Consent:  Patient and/or health care decision maker is aware that that patient may receive a bill for this telephone service, depending on one's insurance coverage, and has provided verbal consent to proceed: Yes    Patient identification was verified at the start of the visit: Yes    Chief complaint: Marvin Duffy is 73 y.o.,  female, with and of pain in the neck as well as in the low back. Patient is complaining of pain involving the neck as well as in the low back. Patient's pain in the neck has been getting worse. She is also feels numbness and tingling and weakness in the left arm. At time the pain radiates towards her left jaw. She is on Percocet which she is getting from her physician which are not helping the pain as much. She apparently had an MRI of the cervical spine done. Patient had a cervical fusion done at C6-7 in the past and apparently she has some discs that herniated above according to the patient. Her back pain is essentially unchanged. She had a solid lumbar fusion done which was revised in 2017 and had a caudal epidural steroid injections done which helped her pain to a certain extent. Patient was seen by neurosurgery department and they recommended cervical epidural steroid injection. Patient would like to get a second opinion regarding this. Neck Pain   This is a chronic problem. The current episode started more than 1 year ago. The problem occurs constantly. The problem has been gradually worsening. The pain is associated with nothing. Pain location: At the base of the neck. The quality of the pain is described as aching. The pain is at a severity of 10/10 (4-10). The pain is moderate. The symptoms are aggravated by stress, position, bending and twisting (ADLs and lifting). The pain is same all the time.  Associated symptoms include chest pain and numbness. Pertinent negatives include no photophobia, tingling or weakness. Back Pain  This is a chronic problem. The current episode started more than 1 year ago. The problem occurs constantly. The problem has been gradually worsening since onset. The pain is present in the lumbar spine and sacro-iliac. The quality of the pain is described as aching. The pain radiates to the left foot and right foot. The pain is at a severity of 6/10. The pain is moderate. The pain is the same all the time. The symptoms are aggravated by bending, position, twisting and coughing (ADLs, lifting and walking). Associated symptoms include chest pain and numbness. Pertinent negatives include no abdominal pain, bladder incontinence, bowel incontinence, dysuria, tingling or weakness. Risk factors include lack of exercise and sedentary lifestyle. Alleviating factors:nothing   Lifestyle changes experienced with pain: Wakes from sleep, Prevents or limits ADLs, Increases w/activity.  , Increases w/prolonged sitting/standing/walking  Mood changes,irritable  Patient currently unemployed. Physical therapy did not help the pain. Are you under psychological counseling at present: No  Goals for treatment include:  Decrease in pain  Enjoy daily and recreational activities, return to previous status. Patient relates current medications are helping the pain. Patient reports taking pain medications as prescribed, denies obtaining medications from different sources and denies use of illegal drugs. Patient denies side effects from medications like nausea, vomiting, constipation or drowsiness. Patient reports current activities of daily living ar possible due to medications and would like to continue them.        ACTIVITY/SOCIAL/EMOTIONAL:  Sleep Pattern: 6 hours per night. nightime awakenings and difficulty falling back asleep if awakened  Home Exercises: daily Does some exercises from physical therapy  Activity:not significantly changed  Emotional Issues: anxiety/ nervousness.    Currently seeing a Psychiatrist or Psychologist:  No     ADVERSE MEDICATION EFFECTS:   Nausea and vomiting: no   Constipation: no-Undercontrol-: yes  Dizziness/drowsy/sleepy--no  Urinary Retention: no    ABERRANT BEHAVIORS SINCE LAST VISIT  Lost rx/pills:------------------------------------------ no  Taking  medication as prescribed: ----------- yes  Urine Drug Screen ---------------------------------  yes             Date------------------------------------------------- 3/29/2019              Results as expected ---------------------yes    Recent ER visits: -------------------------------------Yes  Pill count is appropriate: ---------------------------yes   Refills for prescriptions appropriate:---------- yes      Past Medical History:   Diagnosis Date    Anxiety     Arthritis     Asthma     Blood circulation, collateral     CAD (coronary artery disease)     Chronic back pain     Depression     YEARS AGO NO RECENT PROBLEMS    Difficult intravenous access     AT TIMES    Disease of blood and blood forming organ     Diverticulitis     D[facility]deep [facility] thrombosis) ([facility])     Right upper arm    Dyslexia     GERD (gastroesophageal reflux disease) 2014    ON RX    Headache(784.0)     Hx of blood clots 2001    LT UPPER LEG TREATED WITH INJECTIONS THEN ORAL BLOOD THINNER FOR 1 YR    Hyperlipidemia 2014    ON RX    Hypertension 2014    ON RX    Migraine     Mitral valve prolapse 1970    NO SYMPTOMS    Movement disorder     Neuromuscular disorder ([facility] [address]     Sleep apnea 2006    ON MACHINE HAD SURGERY-NOW RESOLVED    Wears dentures     UPPER    Wears glasses        Past Surgical History:   Procedure Laterality Date    BACK SURGERY      LUMBAR UNSURE WHEN    BACK SURGERY  10/17/2016    hardware removal, revision posterior instrumentation    CERVICAL FUSION  2013    Anterior    COLONOSCOPY  7/16/2012    normal    HYSTERECTOMY  1986    TOTAL    SIGMOIDOSCOPY  6/20/2012    normal    SINUS SURGERY      SKIN CANCER EXCISION  06/2020    SPINE SURGERY  2005    LUMBAR    TONSILLECTOMY  2002    UPPP, TURBINOPLASY    UPPER GASTROINTESTINAL ENDOSCOPY  8/14/2013    Bravo done, see note, hiatal hernia, Grade 1 erosive esophagitis    VARICOSE VEIN SURGERY Bilateral        Family History   Problem Relation Age of Onset    Heart Disease Mother     Cancer Father         prostate    Cancer Brother         leukemia    Cancer Maternal Grandmother         colon    Cancer Paternal Grandmother         colon    Cancer Brother         PROSTATE    Cancer Brother         LUNG    Asthma Brother        Social History     Socioeconomic History    Marital status:      Spouse name: Not on file    Number of children: Not on file    Years of education: Not on file    Highest education level: Not on file   Occupational History    Occupation: Retired   Social Needs    Financial resource strain: Not on file    Food insecurity     Worry: Not on file     Inability: Not on file   [employer] needs     Medical: Not on file     Non-medical: Not on file   Tobacco Use    Smoking status: Never Smoker    Smokeless tobacco: Never Used   Substance and Sexual Activity    Alcohol use: No    Drug use: No    Sexual activity: Not on file   Lifestyle    Physical activity     Days per week: Not on file     Minutes per session: Not on file    Stress: Not on file   Relationships    Social connections     Talks on phone: Not on file     Gets together: Not on file     Attends [employer] service: Not on file     Active member of club or organization: Not on file     Attends meetings of clubs or organizations: Not on file     Relationship status: Not on file    Intimate partner violence     Fear of current or ex partner: Not on file     Emotionally abused: Not on file     Physically abused: Not on file     Forced sexual activity: Not on file   Other Topics Concern    Not congestion, hearing loss and sinus pain. Eyes: Negative. Negative for photophobia, pain, discharge, redness and visual disturbance. Respiratory: Negative. Negative for cough and shortness of breath. Cardiovascular: Positive for chest pain. Negative for palpitations. Gastrointestinal: Negative. Negative for abdominal distention, abdominal pain, bowel incontinence, constipation, nausea and vomiting. Endocrine: Negative. Negative for cold intolerance, polyphagia and polyuria. Genitourinary: Negative. Negative for bladder incontinence, dysuria and hematuria. Musculoskeletal: Positive for arthralgias, back pain, myalgias and neck pain. Skin: Negative. Negative for pallor and rash. Allergic/Immunologic: Negative. Neurological: Positive for numbness. Negative for tingling and weakness. Hematological: Bruises/bleeds easily. Psychiatric/Behavioral: Positive for sleep disturbance. Negative for self-injury and suicidal ideas. The patient is not nervous/anxious. Physical Exam  Skin:         Neurological:      Mental Status: She is alert and oriented to person, place, and time.    Psychiatric:         Mood and Affect: Mood normal.            DATA:  LAB.:  3/29/2019  9:48 PM - Jenn Najera Incoming Lab Results From [facility]    Component Value Ref Range & Units Status Collected Lab   Pain Management Drug Panel Interp, Urine Inconsistent   Final 03/26/2019 12:00 PM [facility]   (NOTE)   ________________________________________________________________   DRUGS EXPECTED:   HYDROCODONE [3/25/19]   ________________________________________________________________   CONSISTENT with medications provided:   HYDROCODONE : based on hydrocodone, norhydrocodone   ________________________________________________________________   INCONSISTENT with medications provided:    Lorazepam     X-Ray reports:    MR cervical spine with and without contrast1/19/2021  [facility]  Result Narrative   Indication: Neck pain on the left.  Left shoulder pain.  Milder right neck pain. TECHNIQUE: Multiplanar multi sequential pre and postcontrast MR imaging of the cervical spine is performed utilizing 17 mL IV ProHance contrast medium.  No prior comparison. FINDINGS: Vertebral body heights and alignment are well-maintained.  Postsurgical change noted anteriorly at C6-7.  Cerebellar tonsils show a normal location.  Apart from some endplate degenerative change, marrow signal appears normal.  Axial images are obtained throughout. At C2-3 Central canal and neural foramina appear patent.  There is mild left neural foraminal narrowing. At C3-4 there is some no significant central canal narrowing.  At least moderate left neural foraminal narrowing noted. At C4-5 there is mild central canal narrowing due to broad-based disc bulge which abuts the ventral cervical spinal cord and may cause mild mass effect upon it.  There is some moderate right and mild left neural foraminal narrowing at this level.  Cord signal remains normal.    At C5-6 there is again noted mild central canal narrowing due to broad-based disc bulge.  Bulge abuts ventral cervical spinal cord.  Dorsal CSF is effaced at this level.  Minimal ventral flattening of cervical spinal cord noted.  Cord signal appears normal.  Neural foramina are widely patent. At C6-7 Central canal and neural foramina appear patent.  Small left perineural cyst noted showing no enhancement. C7-T1 central canal appears patent.  Small left perineural cyst also noted at this levels showing no enhancement. IMPRESSION:  1.  Mild central canal narrowing at C4-5 and C5-C6 due to broad-based disc.  There may be minimal ventral flattening of cervical spinal cord at these levels. 2.  Some neural foraminal narrowing on the left as described above.     Workstation:[id number]    Finalized by Kathleen Abdul MD on 1/19/2021 11     X-ray spine cervical 4 or [address] [place] [address] Diffuse hepatic steatosis with more focal steatosis adjacent to the falciform   ligament.  No suspicious intrahepatic mass or biliary dilatation.  The portal   vasculature is patent.  The gallbladder, common bile duct, pancreas, spleen,   and adrenals are normal.  The kidneys are symmetric with no hydronephrosis or   acute abnormality.       The stomach, small bowel, and appendix are normal.  Left hemicolonic   diverticulosis.  No significant focal pericolonic inflammatory changes.       No ascites or free air.  No abscess.       Pelvis:  The bladder and rectum are normal.  No ascites or abscess.  Prior   hysterectomy.       Musculoskeletal structures:  No significant enlarged inguinal lymph nodes. Bilateral gluteus medius/minimus symmetric atrophy.  Prior lumbar laminectomy   and posterior fusion.  Right iliac bone graft harvest site.  Normal pelvic   alignment with no acute abnormality.  Mild symmetric hip arthropathy.  [place]   sutures of the greater trochanters suggesting prior gluteus attachment   repair.  No acute osseous abnormality.           Impression   1. No acute abdominal process.  Hepatic steatosis. 2. Sigmoid diverticulosis with no CT evidence of acute diverticulitis. Lumbar spine 2 views 10/17/2016       Clinical history: 80-year-old female, status post fusion L2-L5, L5-S1, left L5 ileum       AP lateral views lumbar spine are obtained portably in the PACU.       Comparison made to intraoperative films 4/17/2016 previous study 4/11/2016       There are midline posterior skin staples. Osteopenia with slight dextroconvex curvature lumbar spine. Extensive previous back surgery with posterior decompression and previous L5-S1 fusion with intervertebral disc prostheses. There are now pedicle screws    at L2, L4, L5, S1, and a screw in the left ilium with posterior fusion rods.  Subtle lucency in the right posterior iliac spine is likely related to bone graft harvest site and appears similar.     Impression:       Recent postoperative changes with lumbosacral fusion as noted above.       Final report electronically signed by Blue Portillo M.D. on 10/17/2016      REPORT: MRI lumbar spine       TECHNIQUE: Multiplanar multisequence magnetic resonance imaging of the lumbar spine performed without contrast.       INDICATION: Chronic midline low back pain with bilateral sciatica       FINDINGS: Patient is status post L2-L5 pedicle screw and nic fusion with posterior decompressive laminectomy. The hardware is causing severe metallic susceptibility artifact which completely obscures the lumbar spine from L1-L2 to L5-S1. The    thoracolumbar junction is degenerated but within normal limits.           Impression   Overall nondiagnostic examination of the lumbar spine. Consider CT as an alternative.       Final report electronically signed by Darcella Harada on 6/22/2016        Clinical  impression:  1. Cervical spondylolysis    2. Lumbar radicular pain    3. DDD (degenerative disc disease), lumbar    4. Lumbar facet joint pain    5. Osteoarthritis of spine with radiculopathy, lumbar region    6. Lumbar facet arthropathy    7. Status post lumbar spinal fusion    8. Failed back syndrome    9. DDD (degenerative disc disease), cervical    10. S/P cervical spinal fusion    11. Encounter for pain management    12. Encounter for medication management    13. Cervical radiculopathy        Plan of care:  Patient is getting medications from her physician and hence she is advised to get medications from him if they need to be changed. I did discuss with her regarding her cervical epidural steroid injection. Patient at present would like to get a second opinion regarding surgeon and she had surgery by Dr. Francisco Lomax, hence we will refer her to [facility] for his opinion. Pending on his assessment we can plan further treatment. Patient agrees with the treatment plan.     PDMP Monitoring:    Review User Review Instant Review Result Tony Saleemles 2/2/2021  6:54 AM Reviewed PDMP [1]     Counselling/Preventive measures for pain  Control:    [x]  Spine strengthening exercises are discussed with patient in detail. Orders Placed This Encounter   Procedures    Ambulatory referral to Orthopedic Surgery     Referral Priority:   Routine     Referral Type:   Consult for Advice and Opinion     Referral Reason:   Specialty Services Required     Referred to Provider:   Aneesh Craven MD     Requested Specialty:   Orthopedic Surgery     Number of Visits Requested:   1       Decision Making Process : Patient's health history and referral records thoroughly reviewed before focused physical examination and discussion with patient. I have spent 25 mins. Over 50% of today's visit is spent on examining the patient and counseling and coordinating the care. Level of complexity of date to be reviewed is Moderate. The chart date reviewed include the following: Imaging Reports. Summary of Care. Time spent reviewing with patient the below reports:   Medication safety, Treatment options. Level of diagnosis and management options of this case is multiple: involving the following management options: Interventions as needed, medication management as appropriate, future visits, activity modification, heat/ice as needed, Urine drug screen as required. [x]The patient's questions were answered to the best of my abilities. This note was created using voice recognition software. There may be inaccuracies of transcription  that are inadvertently overlooked prior to the signature. There is any questions about the transcription please contact me. Return in  4 weeks  with physician / CNP  for further plan of treatment.   Due to the COVID-19 pandemic and the appropriate interventions by [facility], our non-urgent pain management patients will not be seen in the office at this time for their protection and the protection of our staff. To offer continuity of care, their prescriptions will be escribed this month after a careful chart review and review of their OARRS report  Pursuant to the emergency declaration under the [employer] and [facility], 1135 waiver authority and the [employer] and [employer] Act, this Virtual Visit was conducted, with patient's consent, to reduce the patient's risk of exposure to COVID-19 and provide continuity of care for an established patient. Services were provided through a video synchronous discussion virtually to substitute for in-person appointment. \"  Documentation:  I communicated with the patient and/or health care decision maker about plan of care  Details of this discussion including any medical advice provided: Total Time: minutes: 21-30 minutes    I affirm this is a Patient Initiated Episode with an Established Patient who has not had a related appointment within my department in the past 7 days or scheduled within the next 24 hours.     Electronically signed by Lincoln Carrasquillo MD on 2/2/2021 at 9:18 AM

## 2021-05-27 ENCOUNTER — HOSPITAL ENCOUNTER (OUTPATIENT)
Dept: PAIN MANAGEMENT | Age: 71
Discharge: HOME | End: 2021-05-27
Payer: MEDICARE

## 2021-05-27 DIAGNOSIS — Z01.812 PRE-PROCEDURE LAB EXAM: ICD-10-CM

## 2021-05-27 DIAGNOSIS — M54.59 LUMBAR FACET JOINT PAIN: ICD-10-CM

## 2021-05-27 DIAGNOSIS — M43.02 CERVICAL SPONDYLOLYSIS: ICD-10-CM

## 2021-05-27 DIAGNOSIS — M50.30 DDD (DEGENERATIVE DISC DISEASE), CERVICAL: ICD-10-CM

## 2021-05-27 DIAGNOSIS — M47.816 LUMBAR FACET ARTHROPATHY: Chronic | ICD-10-CM

## 2021-05-27 DIAGNOSIS — Z98.1 S/P LUMBAR FUSION: ICD-10-CM

## 2021-05-27 DIAGNOSIS — G89.29 CHRONIC LOW BACK PAIN, UNSPECIFIED BACK PAIN LATERALITY, UNSPECIFIED WHETHER SCIATICA PRESENT: ICD-10-CM

## 2021-05-27 DIAGNOSIS — M96.1 CERVICAL POST-LAMINECTOMY SYNDROME: ICD-10-CM

## 2021-05-27 DIAGNOSIS — M53.3 SACROILIAC JOINT DISEASE: Primary | ICD-10-CM

## 2021-05-27 DIAGNOSIS — M47.812 CERVICAL SPONDYLOSIS: ICD-10-CM

## 2021-05-27 DIAGNOSIS — M54.50 CHRONIC LOW BACK PAIN, UNSPECIFIED BACK PAIN LATERALITY, UNSPECIFIED WHETHER SCIATICA PRESENT: ICD-10-CM

## 2021-05-27 DIAGNOSIS — M51.36 DDD (DEGENERATIVE DISC DISEASE), LUMBAR: ICD-10-CM

## 2021-05-27 DIAGNOSIS — M96.1 FAILED BACK SYNDROME: ICD-10-CM

## 2021-05-27 PROCEDURE — 99213 OFFICE O/P EST LOW 20 MIN: CPT

## 2021-05-27 PROCEDURE — 99442 PR PHYS/QHP TELEPHONE EVALUATION 11-20 MIN: CPT | Performed by: NURSE PRACTITIONER

## 2021-05-27 RX ORDER — NITROFURANTOIN 25; 75 MG/1; MG/1
CAPSULE ORAL
COMMUNITY
Start: 2021-04-27 | End: 2021-05-27 | Stop reason: ALTCHOICE

## 2021-05-27 RX ORDER — PREDNISOLONE ACETATE 10 MG/ML
SUSPENSION/ DROPS OPHTHALMIC
COMMUNITY
Start: 2021-04-06 | End: 2021-05-27 | Stop reason: ALTCHOICE

## 2021-05-27 RX ORDER — ALENDRONATE SODIUM 70 MG/1
TABLET ORAL
COMMUNITY
Start: 2021-04-05

## 2021-05-27 ASSESSMENT — ENCOUNTER SYMPTOMS
GASTROINTESTINAL NEGATIVE: 1
EYES NEGATIVE: 1
RESPIRATORY NEGATIVE: 1

## 2021-05-27 NOTE — PROGRESS NOTES
[place] 89 PROGRESS NOTE      Patient  completed []  video visit   [x]   phone call:  12       Minutes :       []    to  review Medication Agreement    []  Follow up after procedure   [x]  Discuss treatment options      Location:  Provider:  working from    [x]    home    []   [facility] - MIRIAN KENDRICK ,   patient at  home       Chief Complaint:  Neck pain    She c/o neck pain left shoulder and arm and finger. Sometimes the pain radiates down her right arm,She c/o numbness in her pinkie. She has history of cervical fusion  C6, C7 in 2013. She also has headaches with her neck pain. She has history of lumbar fusion, She does not sleep well due to pain . Her activity is limited. She saw Dr Yfn Waller , notes reviewed, he recommended cervical epidural C5, C6 then cervical MBB C3, C4. She completed antibiotic therapy for UTI. She has had both covid vaccines a few  months ago,    Neck Pain   This is a chronic problem. The problem occurs constantly. The problem has been waxing and waning. Pain location: left shoulder and arm,sometimes down right side to pinkie. Quality: dull. The pain is at a severity of 4/10. The pain is moderate. Exacerbated by: turning neck. The pain is same all the time. Associated symptoms include headaches, numbness and weakness. She has tried oral narcotics and bed rest for the symptoms.          Treatment goals:  Functional status: get rid of pain    Aberrancy:   Any alcoholic beverages  no          Any illegal drugs   no      Analgesia:     4                Adverse  Effects :n/a    ADL;s :limited    Data:    When was thelast UDS:   n/a         Was the UDS appropriate:  [] yes []   no      Record/Diagnostics Review:      As above, I did review the imaging       EXAMINATION:   5 XRAY VIEWS OF THE CERVICAL SPINE       1/24/2020 2:32 pm       COMPARISON:   Cervical spine radiographs 04/11/2016       HISTORY:   ORDERING SYSTEM PROVIDED HISTORY: S/P cervical spinal fusion   TECHNOLOGIST PROVIDED HISTORY:   Pt. Had a fall  In Dec with increased pain and numbness in hands   Reason for Exam: fall in december. increased pain and numbness in hands       FINDINGS:   All 7 cervical vertebrae are visualized and are normal in height and   alignment.  There are postsurgical changes from C6-C7 discectomy and anterior   plate and screw fixation.  Hardware is intact and in unchanged position. There is osseous fusion across the C6-C7 disc space.  Base of the odontoid is   intact.  Normal atlantoaxial alignment.  Mild multilevel disc narrowing and   endplate osteophyte formation and mild multilevel uncovertebral and facet   joint degenerative changes are similar to prior.  No acute fracture.           Impression   Status post C6-C7 discectomy and anterior fixation.  No radiographic evidence   of hardware complication.       No acute osseous abnormality.  Multilevel degenerative changes of the   cervical spine are similar to prior. Pill count: appropriate    fill date :n/a  Morphine equivalent dose as reported on OARRS:     Review ofOARRS does not show any aberrant prescription behavior. Medication is helping the patient stay active. Patient denies any side effects and reports adequate analgesia. No sign of misuse/abuse.             Past Medical History:   Diagnosis Date    Anxiety     Arthritis     Asthma     Blood circulation, collateral     CAD (coronary artery disease)     Chronic back pain     Depression     YEARS AGO NO RECENT PROBLEMS    Difficult intravenous access     AT TIMES    Disease of blood and blood forming organ     Diverticulitis     DVT (deep venous thrombosis) ([facility])     Right upper arm    Dyslexia     GERD (gastroesophageal reflux disease) 2014    ON RX    Headache(784.0)     Hx of blood clots 2001    LT UPPER LEG TREATED WITH INJECTIONS THEN ORAL BLOOD THINNER FOR 1 YR    Hyperlipidemia 2014    ON RX    Hypertension 2014    ON RX    Migraine     Mitral valve nitroGLYCERIN (NITROSTAT) 0.4 MG SL tablet, Place 0.4 mg under the tongue every 5 minutes as needed for Chest pain up to max of 3 total doses. If no relief after 1 dose, call 911., Disp: , Rfl:     LORazepam (ATIVAN) 1 MG tablet, Take 1 mg by mouth 3 times daily as needed for Anxiety. , Disp: , Rfl:     tiZANidine (ZANAFLEX) 4 MG tablet, Take 1 tablet by mouth every 8 hours as needed (muscle spasms), Disp: 90 tablet, Rfl: 0    Blood Pressure Monitoring (BLOOD PRESSURE CUFF) MISC, 1 each by Does not apply route 2 times daily, Disp: 1 each, Rfl: 0    albuterol (PROVENTIL) (2.5 MG/3ML) 0.083% nebulizer solution, INHALE THE CONTENTS OF 1 VIAL VIA NEBULIZER EVERY 6 HOURS AS NEEDED  FOR  WHEEZING (SUBSTITUTED FOR PROVENTIL), Disp: 360 mL, Rfl: 1    Family History   Problem Relation Age of Onset    Heart Disease Mother     Cancer Father         prostate    Cancer Brother         leukemia    Cancer Maternal Grandmother         colon    Cancer Paternal Grandmother         colon    Cancer Brother         PROSTATE    Cancer Brother         LUNG    Asthma Brother        Social History     Socioeconomic History    Marital status:      Spouse name: Not on file    Number of children: Not on file    Years of education: Not on file    Highest education level: Not on file   Occupational History    Occupation: Retired   Tobacco Use    Smoking status: Never Smoker    Smokeless tobacco: Never Used   Vaping Use    Vaping Use: Never used   Substance and Sexual Activity    Alcohol use: No    Drug use: No    Sexual activity: Not on file   Other Topics Concern    Not on file   Social History Narrative    Not on file     Social Determinants of Health     Financial Resource Strain:     Difficulty of Paying Living Expenses:    Food Insecurity:     Worried About Running Out of Food in the Last Year:     Ran Out of Food in the Last Year:    Transportation Needs:     Lack of Transportation (Medical):      Lack of Transportation (Non-Medical):    Physical Activity:     Days of Exercise per Week:     Minutes of Exercise per Session:    Stress:     Feeling of Stress :    Social Connections:     Frequency of Communication with Friends and Family:     Frequency of Social Gatherings with Friends and Family:     Attends Sikhism Services:     Active Member of Clubs or Organizations:     Attends Club or Organization Meetings:     Marital Status:    Intimate Partner Violence:     Fear of Current or Ex-Partner:     Emotionally Abused:     Physically Abused:     Sexually Abused:          Review of Systems:  Review of Systems   Constitutional: Negative. HENT: Negative. Eyes: Negative. Cardiovascular: Negative. Respiratory: Negative. Endocrine: Negative. Hematologic/Lymphatic: Bruises/bleeds easily. Musculoskeletal: Positive for arthritis, joint pain and neck pain. Gastrointestinal: Negative. Genitourinary: Positive for nocturia. Completed antibiotic for UTI   Neurological: Positive for headaches, loss of balance, numbness and weakness. Psychiatric/Behavioral: Negative. Physical Exam:  [facility] 10/05/1986 (Approximate)     Physical Exam  Skin:         Neurological:      Mental Status: She is oriented to person, place, and time. Psychiatric:         Mood and Affect: Mood normal.         Thought Content: Thought content normal.           Assessment:    Problem List Items Addressed This Visit     Sacroiliac joint disease - Primary    S/P lumbar fusion    Lumbar facet joint pain    Lumbar facet arthropathy (Chronic)    Failed back syndrome    DDD (degenerative disc disease), lumbar    DDD (degenerative disc disease), cervical    Chronic back pain    Cervical spondylosis  S/P Cervical fusion    Cervical post-laminectomy syndrome            Treatment Plan:  DISCUSSION: Treatment options discussed withpatient and all questions answered to patient's satisfaction.      Possible side effects,

## 2021-08-27 ENCOUNTER — HOSPITAL ENCOUNTER (OUTPATIENT)
Dept: PAIN MANAGEMENT | Age: 71
Discharge: HOME | End: 2021-08-27
Payer: MEDICARE

## 2021-08-27 DIAGNOSIS — M43.02 CERVICAL SPONDYLOLYSIS: Primary | ICD-10-CM

## 2021-08-27 DIAGNOSIS — M50.30 DDD (DEGENERATIVE DISC DISEASE), CERVICAL: ICD-10-CM

## 2021-08-27 PROCEDURE — 99213 OFFICE O/P EST LOW 20 MIN: CPT

## 2021-08-27 PROCEDURE — 99213 OFFICE O/P EST LOW 20 MIN: CPT | Performed by: NURSE PRACTITIONER

## 2021-08-27 ASSESSMENT — ENCOUNTER SYMPTOMS
COUGH: 0
SHORTNESS OF BREATH: 0
CONSTIPATION: 0

## 2021-08-27 NOTE — PROGRESS NOTES
Patient completed a telephone visit today     Chief Complaint: neck pain    PMH  Pt complains of neck pain. XR cervical spine with Mild multilevel disc narrowing and endplate osteophyte formation and mild multilevel uncovertebral and facet joint degenerative changes. Pain is gradually worsening. She states it is difficult for her to find a comfortable position and the pain is interfering with ADL -  she cannot drive due to pain. She saw Dr. Chepe Michelle and he recommend cervical epidural and cervical MBB. He recommends MCKENZIE at C4-5, C5-6 first then cervical MBB C3-4. Pt is taking coumadin and will need clearance to hold for procedure. Neck Pain   This is a chronic problem. The current episode started more than 1 year ago. The problem occurs constantly. The problem has been gradually worsening. The pain is present in the left side, midline and right side. The quality of the pain is described as aching and shooting. The pain is at a severity of 5/10 (can be 10/10 at times). The pain is moderate. The symptoms are aggravated by position and twisting. Associated symptoms include headaches and numbness. Pertinent negatives include no chest pain or fever. She has tried bed rest and oral narcotics for the symptoms. The treatment provided mild relief. Patient denies any new neurological symptoms. No bowel or bladder incontinence, no weakness, and no falling.     Past Medical History:   Diagnosis Date    Anxiety     Arthritis     Asthma     Blood circulation, collateral     CAD (coronary artery disease)     Chronic back pain     Depression     YEARS AGO NO RECENT PROBLEMS    Difficult intravenous access     AT TIMES    Disease of blood and blood forming organ     Diverticulitis     DVT (deep venous thrombosis) ([facility])     Right upper arm    Dyslexia     GERD (gastroesophageal reflux disease) 2014    ON RX    Headache(784.0)     Hx of blood clots 2001    LT UPPER LEG TREATED WITH INJECTIONS THEN ORAL BLOOD THINNER FOR 1 YR    Hyperlipidemia 2014    ON RX    Hypertension 2014    ON RX    Migraine     Mitral valve prolapse 1970    NO SYMPTOMS    Movement disorder     Neuromuscular disorder ([address]     Sleep apnea 2006    ON MACHINE HAD SURGERY-NOW RESOLVED    Wears dentures     UPPER    Wears glasses        Past Surgical History:   Procedure Laterality Date    BACK SURGERY      LUMBAR UNSURE WHEN    BACK SURGERY  10/17/2016    hardware removal, revision posterior instrumentation    CATARACT REMOVAL Bilateral 03/2021    both eyes    CERVICAL FUSION  2013    Anterior    COLONOSCOPY  7/16/2012    normal    HYSTERECTOMY  1986    TOTAL    SIGMOIDOSCOPY  6/20/2012    normal    SINUS SURGERY      SKIN CANCER EXCISION  06/2020    SPINE SURGERY  2005    LUMBAR    TONSILLECTOMY  2002    UPPP, TURBINOPLASY    UPPER GASTROINTESTINAL ENDOSCOPY  8/14/2013    Bravo done, see note, hiatal hernia, Grade 1 erosive esophagitis    VARICOSE VEIN SURGERY Bilateral        Allergies   Allergen Reactions    Latex      Rubber gloves     Xarelto [Rivaroxaban] Hives, Swelling and Dermatitis    Morphine      HEADACHE, DROPS BLOOD PRESSURE      Seasonal     Adhesive Tape Hives         Current Outpatient Medications:     alendronate (FOSAMAX) 70 MG tablet, TAKE 1 TABLET BY MOUTH WITH PLAIN WATER ONCE A WEEK 30 MINUTES BEFORE FIRST FOOD,DRINK,OR RX OF DAY, Disp: , Rfl:     warfarin (COUMADIN) 2.5 MG tablet, TAKE 2 TABLETS BY MOUTH ON SUNDAY AND THURSDAY, 1 AND 1/2 TABLETS ALL OTHER DAYS OR AS DIRECTED, Disp: , Rfl:     carvedilol (COREG) 3.125 MG tablet, Take 1 tablet by mouth 2 times daily (with meals), Disp: 60 tablet, Rfl: 3    nitroGLYCERIN (NITROSTAT) 0.4 MG SL tablet, Place 0.4 mg under the tongue every 5 minutes as needed for Chest pain up to max of 3 total doses.  If no relief after 1 dose, call 911., Disp: , Rfl:     Cyanocobalamin (VITAMIN B-12) 1000 MCG SUBL, Take 1 tablet by mouth daily, Disp: , Rfl: 11    calcium-vitamin D (OSCAL-500) 500-200 MG-UNIT per tablet, Take 1 tablet by mouth 2 times daily, Disp: , Rfl:     LORazepam (ATIVAN) 1 MG tablet, Take 1 mg by mouth 3 times daily as needed for Anxiety. , Disp: , Rfl:     omeprazole (PRILOSEC) 40 MG delayed release capsule, Take 40 mg by mouth daily, Disp: , Rfl:     tiZANidine (ZANAFLEX) 4 MG tablet, Take 1 tablet by mouth every 8 hours as needed (muscle spasms), Disp: 90 tablet, Rfl: 0    Blood Pressure Monitoring (BLOOD PRESSURE CUFF) MISC, 1 each by Does not apply route 2 times daily, Disp: 1 each, Rfl: 0    atorvastatin (LIPITOR) 20 MG tablet, Take 1 tablet by mouth nightly, Disp: 90 tablet, Rfl: 5    albuterol (PROVENTIL) (2.5 MG/3ML) 0.083% nebulizer solution, INHALE THE CONTENTS OF 1 VIAL VIA NEBULIZER EVERY 6 HOURS AS NEEDED  FOR  WHEEZING (SUBSTITUTED FOR PROVENTIL), Disp: 360 mL, Rfl: 1    Family History   Problem Relation Age of Onset    Heart Disease Mother     Cancer Father         prostate    Cancer Brother         leukemia    Cancer Maternal Grandmother         colon    Cancer Paternal Grandmother         colon    Cancer Brother         PROSTATE    Cancer Brother         LUNG    Asthma Brother        Social History     Socioeconomic History    Marital status:      Spouse name: Not on file    Number of children: Not on file    Years of education: Not on file    Highest education level: Not on file   Occupational History    Occupation: Retired   Tobacco Use    Smoking status: Never Smoker    Smokeless tobacco: Never Used   Vaping Use    Vaping Use: Never used   Substance and Sexual Activity    Alcohol use: No    Drug use: No    Sexual activity: Not on file   Other Topics Concern    Not on file   Social History Narrative    Not on file     Social Determinants of Health     Financial Resource Strain:     Difficulty of Paying Living Expenses:    Food Insecurity:     Worried About Running Out of Food in the Last Year:     Ran Out of Food in the Last Year:    Transportation Needs:     Lack of Transportation (Medical):     Lack of Transportation (Non-Medical):    Physical Activity:     Days of Exercise per Week:     Minutes of Exercise per Session:    Stress:     Feeling of Stress :    Social Connections:     Frequency of Communication with Friends and Family:     Frequency of Social Gatherings with Friends and Family:     Attends Holiness Services: Active Member of Clubs or Organizations:     Attends Club or Organization Meetings:     Marital Status:    Intimate Partner Violence:     Fear of Current or Ex-Partner:     Emotionally Abused:     Physically Abused:     Sexually Abused:        Review of Systems:  Review of Systems   Constitutional: Negative for chills and fever. Cardiovascular: Negative for chest pain and palpitations. Respiratory: Negative for cough and shortness of breath. Musculoskeletal: Positive for neck pain. Gastrointestinal: Negative for constipation. Neurological: Positive for headaches and numbness. Negative for disturbances in coordination and loss of balance. Physical Exam:  LMP 10/05/1986 (Approximate)     Physical Exam  Neurological:      Mental Status: She is alert. Psychiatric:         Mood and Affect: Mood normal.         Record/Diagnostics Review:    FINDINGS:   All 7 cervical vertebrae are visualized and are normal in height and   alignment. There are postsurgical changes from C6-C7 discectomy and anterior   plate and screw fixation. Hardware is intact and in unchanged position. There is osseous fusion across the C6-C7 disc space. Base of the odontoid is   intact. Normal atlantoaxial alignment. Mild multilevel disc narrowing and   endplate osteophyte formation and mild multilevel uncovertebral and facet   joint degenerative changes are similar to prior. No acute fracture. Impression   Status post C6-C7 discectomy and anterior fixation. No radiographic evidence   of hardware complication. No acute osseous abnormality. Multilevel degenerative changes of the   cervical spine are similar to prior. Assessment:  Problem List Items Addressed This Visit       None               Treatment Plan:  Pain in the neck and arm continues despite conservative measures  Has seen neurosurgeon who thinks she may benefit from epidural steroid injection at C4-5  MCKENZIE x1 C4-5  Patient is on Coumadin. She will need clearance to hold for procedure.   Pre-procedural instructions are reviewed

## 2021-09-09 NOTE — PRE-PROCEDURE INSTRUCTIONS
[facility]. Pt outreached to review pre-procedure instructions. Pt instructed to arrive an hour prior, and to go to admitting to have PT/INR drawn. Pt. Instructed to wear a mask on entrance into the hospital. Pt. Stated she does have a  to take her home. Pt. Has had COVID 19 vaccinations x 2 with the last dose Feb. 2021. No questions or concerns verbalized. Writer encouraged pt. To wear hair up for procedure.

## 2021-09-13 ENCOUNTER — HOSPITAL ENCOUNTER (OUTPATIENT)
Dept: PAIN MANAGEMENT | Age: 71
Discharge: HOME | End: 2021-09-13
Payer: MEDICARE

## 2021-09-13 ENCOUNTER — HOSPITAL ENCOUNTER (OUTPATIENT)
Dept: GENERAL RADIOLOGY | Age: 71
Discharge: HOME | End: 2021-09-15
Payer: MEDICARE

## 2021-09-13 ENCOUNTER — HOSPITAL ENCOUNTER (OUTPATIENT)
Age: 71
End: 2021-09-13
Payer: MEDICARE

## 2021-09-13 VITALS
RESPIRATION RATE: 16 BRPM | OXYGEN SATURATION: 96 % | HEIGHT: 64 IN | SYSTOLIC BLOOD PRESSURE: 129 MMHG | DIASTOLIC BLOOD PRESSURE: 83 MMHG | BODY MASS INDEX: 32.44 KG/M2 | HEART RATE: 70 BPM | WEIGHT: 190 LBS | TEMPERATURE: 97.8 F

## 2021-09-13 DIAGNOSIS — M54.12 CERVICAL RADICULOPATHY: ICD-10-CM

## 2021-09-13 DIAGNOSIS — M50.30 DDD (DEGENERATIVE DISC DISEASE), CERVICAL: ICD-10-CM

## 2021-09-13 DIAGNOSIS — M96.1 CERVICAL POST-LAMINECTOMY SYNDROME: ICD-10-CM

## 2021-09-13 DIAGNOSIS — M43.02 CERVICAL SPONDYLOLYSIS: ICD-10-CM

## 2021-09-13 DIAGNOSIS — Z79.01 LONG TERM CURRENT USE OF ANTICOAGULANT THERAPY: ICD-10-CM

## 2021-09-13 DIAGNOSIS — R79.1 SUBTHERAPEUTIC INTERNATIONAL NORMALIZED RATIO (INR): Primary | ICD-10-CM

## 2021-09-13 LAB
INR BLD: 1
PROTHROMBIN TIME: 13.3 SEC (ref 11.8–14.6)

## 2021-09-13 PROCEDURE — 6360000002 HC RX W HCPCS: Performed by: PAIN MEDICINE

## 2021-09-13 PROCEDURE — 85610 PROTHROMBIN TIME: CPT

## 2021-09-13 PROCEDURE — 3209999900 FLUORO FOR SURGICAL PROCEDURES

## 2021-09-13 PROCEDURE — 62321 NJX INTERLAMINAR CRV/THRC: CPT | Performed by: PAIN MEDICINE

## 2021-09-13 PROCEDURE — 36415 COLL VENOUS BLD VENIPUNCTURE: CPT

## 2021-09-13 PROCEDURE — 2580000003 HC RX 258: Performed by: PAIN MEDICINE

## 2021-09-13 PROCEDURE — 62321 NJX INTERLAMINAR CRV/THRC: CPT

## 2021-09-13 PROCEDURE — 6360000004 HC RX CONTRAST MEDICATION: Performed by: PAIN MEDICINE

## 2021-09-13 RX ORDER — SODIUM CHLORIDE, SODIUM LACTATE, POTASSIUM CHLORIDE, CALCIUM CHLORIDE 600; 310; 30; 20 MG/100ML; MG/100ML; MG/100ML; MG/100ML
75 INJECTION, SOLUTION INTRAVENOUS CONTINUOUS
Status: DISCONTINUED | OUTPATIENT
Start: 2021-09-13 | End: 2021-09-14 | Stop reason: HOSPADM

## 2021-09-13 RX ORDER — MIDAZOLAM HYDROCHLORIDE 1 MG/ML
INJECTION INTRAMUSCULAR; INTRAVENOUS
Status: COMPLETED | OUTPATIENT
Start: 2021-09-13 | End: 2021-09-13

## 2021-09-13 RX ORDER — BETAMETHASONE SODIUM PHOSPHATE AND BETAMETHASONE ACETATE 3; 3 MG/ML; MG/ML
INJECTION, SUSPENSION INTRA-ARTICULAR; INTRALESIONAL; INTRAMUSCULAR; SOFT TISSUE
Status: COMPLETED | OUTPATIENT
Start: 2021-09-13 | End: 2021-09-13

## 2021-09-13 RX ADMIN — IOHEXOL 1 ML: 180 INJECTION INTRAVENOUS at 12:27

## 2021-09-13 RX ADMIN — MIDAZOLAM 2 MG: 1 INJECTION INTRAMUSCULAR; INTRAVENOUS at 12:20

## 2021-09-13 RX ADMIN — SODIUM CHLORIDE, POTASSIUM CHLORIDE, SODIUM LACTATE AND CALCIUM CHLORIDE 75 ML/HR: 600; 310; 30; 20 INJECTION, SOLUTION INTRAVENOUS at 11:50

## 2021-09-13 RX ADMIN — BETAMETHASONE SODIUM PHOSPHATE AND BETAMETHASONE ACETATE 12 MG: 3; 3 INJECTION, SUSPENSION INTRA-ARTICULAR; INTRALESIONAL; INTRAMUSCULAR at 12:28

## 2021-09-13 ASSESSMENT — PAIN DESCRIPTION - DESCRIPTORS: DESCRIPTORS: ACHING;STABBING

## 2021-09-13 ASSESSMENT — PAIN DESCRIPTION - DIRECTION: RADIATING_TOWARDS: BOTH SIDES OF NECK

## 2021-09-13 ASSESSMENT — PAIN SCALES - GENERAL: PAINLEVEL_OUTOF10: 4

## 2021-09-13 ASSESSMENT — PAIN DESCRIPTION - LOCATION: LOCATION: BACK;NECK;HIP;LEG

## 2021-09-13 ASSESSMENT — PAIN - FUNCTIONAL ASSESSMENT
PAIN_FUNCTIONAL_ASSESSMENT: 0-10
PAIN_FUNCTIONAL_ASSESSMENT: PREVENTS OR INTERFERES WITH MANY ACTIVE NOT PASSIVE ACTIVITIES
PAIN_FUNCTIONAL_ASSESSMENT: 0-10

## 2021-09-13 ASSESSMENT — PAIN DESCRIPTION - ORIENTATION: ORIENTATION: RIGHT;LEFT

## 2021-09-13 ASSESSMENT — PAIN DESCRIPTION - PAIN TYPE: TYPE: CHRONIC PAIN

## 2021-09-13 ASSESSMENT — PAIN DESCRIPTION - ONSET: ONSET: ON-GOING

## 2021-09-13 ASSESSMENT — PAIN DESCRIPTION - FREQUENCY: FREQUENCY: CONTINUOUS

## 2021-09-13 ASSESSMENT — PAIN DESCRIPTION - PROGRESSION: CLINICAL_PROGRESSION: GRADUALLY WORSENING

## 2021-09-13 NOTE — PROGRESS NOTES
Discharge criteria met. Patient alert and oriented x3  Post procedure dressing dry and intact. Sensory and motor function intact as per pre-procedure. Instructions and follow up reviewed with pt at patient at discharge. Patient discharged ambulatory @ 12:57pm    Patient discharged per wheelchair accompanied by RN to main hospital entrance to meet  for ride home.

## 2021-09-14 ENCOUNTER — TELEPHONE (OUTPATIENT)
Dept: PAIN MANAGEMENT | Age: 71
End: 2021-09-14

## 2021-09-14 NOTE — PROCEDURES
Procedure:  After starting IV, the patient was sedated with 2 mg of Midazolam and 0 mcg of Fentanyl Intravenously by the RN under my direct supervision. Patient's vital signs including BP, EKG and SaO2 were monitored by the RN and they remained stable during the procedure. A meaningful communication was kept up with the patient throughout  the procedure. The patient is placed in prone position. Skin over the neck was prepped and draped in sterile manner. Then using fluoroscopy the C5 and C6 interspace was observed and the skin and deep tissues over the midline were infiltrated with 4  ml of 1% lidocaine. The #20-gauge, 3-1/2 inch Tuohy needle was inserted through the skin wheal and the epidural space was identified using loss of resistance technique with normal saline. This was confirmed with AP and lateral views of the fluoroscopy after injecting about 1 ml of Omnipaque-180 and observing the spread of the contrast in the epidural space. Then after negative aspiration a total of 12 mg of Celestone with 2 ml of normal saline were injected into the epidural space. The needle is removed and a Band-Aid was placed over the needle insertion site. Patient's vital signs remained stable and tolerated the procedure well. Patient was discharged home in stable condition and will be followed in the pain clinic in next few weeks for further planning.     Electronically signed by Melinda Cortez MD on 9/14/2021 at 5:42 AM

## 2021-09-27 ENCOUNTER — HOSPITAL ENCOUNTER (OUTPATIENT)
Dept: PAIN MANAGEMENT | Age: 71
Discharge: HOME | End: 2021-09-27
Payer: MEDICARE

## 2021-09-27 DIAGNOSIS — M50.30 DDD (DEGENERATIVE DISC DISEASE), CERVICAL: ICD-10-CM

## 2021-09-27 DIAGNOSIS — M47.812 CERVICAL SPONDYLOSIS: ICD-10-CM

## 2021-09-27 DIAGNOSIS — M43.02 CERVICAL SPONDYLOLYSIS: Primary | ICD-10-CM

## 2021-09-27 PROCEDURE — 99213 OFFICE O/P EST LOW 20 MIN: CPT | Performed by: NURSE PRACTITIONER

## 2021-09-27 PROCEDURE — 99213 OFFICE O/P EST LOW 20 MIN: CPT

## 2021-09-27 RX ORDER — OXYCODONE HYDROCHLORIDE AND ACETAMINOPHEN 5; 325 MG/1; MG/1
TABLET ORAL
COMMUNITY
Start: 2021-09-10

## 2021-09-27 RX ORDER — PREDNISONE 10 MG/1
TABLET ORAL
COMMUNITY
Start: 2021-08-18

## 2021-09-27 RX ORDER — CYCLOBENZAPRINE HCL 10 MG
TABLET ORAL
COMMUNITY
Start: 2021-08-18

## 2021-09-27 ASSESSMENT — ENCOUNTER SYMPTOMS
SHORTNESS OF BREATH: 0
BACK PAIN: 1
COUGH: 0
CONSTIPATION: 0

## 2021-09-27 NOTE — PROGRESS NOTES
Patient completed a telephone visit today for follow up after procedure    Chief Complaint: neck pain    PMH   Pt complains of neck pain. XR cervical spine with Mild multilevel disc narrowing and endplate osteophyte formation and mild multilevel uncovertebral and facet joint degenerative changes. Pain is gradually worsening. She states it is difficult for her to find a comfortable position and the pain is interfering with ADL -  she cannot drive due to pain. She saw Dr. Kirk Hutson and he recommend cervical epidural and cervical MBB. He recommends MCKENZIE at C4-5, C5-6 first then cervical MBB C3-4. She had cervical epidural 9/13/21 and reports 50% relief. She continues to have pain on the left side of her neck. Discussed cervical facet injections. She states she had discussed injections in her lower back with Dr. Sofi Johnson at her last visit. She states this pain is more bothersome that the neck at this time. Neck Pain   This is a chronic problem. The current episode started more than 1 year ago. The problem occurs intermittently. The problem has been gradually improving. The pain is present in the midline and left side. The quality of the pain is described as aching. The pain is at a severity of 3/10. The pain is mild. The symptoms are aggravated by position and twisting. Pertinent negatives include no chest pain or fever. Treatments tried: MCKENZIE. The treatment provided moderate relief. Patient denies any new neurological symptoms. No bowel or bladder incontinence, no weakness, and no falling.       Past Medical History:   Diagnosis Date    Anxiety     Arthritis     Asthma     Blood circulation, collateral     CAD (coronary artery disease)     Chronic back pain     Depression     YEARS AGO NO RECENT PROBLEMS    Difficult intravenous access     AT TIMES    Disease of blood and blood forming organ     Diverticulitis     D[facility]deep venous thrombosis) [facility]     Right upper arm    Dyslexia     GERD (gastroesophageal reflux disease) 2014    ON RX    Headache(784.0)     Hx of blood clots 2001    LT UPPER LEG TREATED WITH INJECTIONS THEN ORAL BLOOD THINNER FOR 1 YR    Hyperlipidemia 2014    ON RX    Hypertension 2014    ON RX    Migraine     Mitral valve prolapse 1970    NO SYMPTOMS    Movement disorder     Neuromuscular disorder ([facility] [address]     Sleep apnea 2006    ON MACHINE HAD SURGERY-NOW RESOLVED    Wears dentures     UPPER    Wears glasses        Past Surgical History:   Procedure Laterality Date    BACK SURGERY      LUMBAR UNSURE WHEN    BACK SURGERY  10/17/2016    hardware removal, revision posterior instrumentation    CATARACT REMOVAL Bilateral 03/2021    both eyes    CERVICAL FUSION  2013    Anterior    COLONOSCOPY  7/16/2012    normal    HYSTERECTOMY  1986    TOTAL    SIGMOIDOSCOPY  6/20/2012    normal    SINUS SURGERY      SKIN CANCER EXCISION  06/2020    SPINE SURGERY  2005    LUMBAR    TONSILLECTOMY  2002    UPPP, TURBINOPLASY    UPPER GASTROINTESTINAL ENDOSCOPY  8/14/2013    Bravo done, see note, hiatal hernia, Grade 1 erosive esophagitis    VARICOSE VEIN SURGERY Bilateral        Allergies   Allergen Reactions    Latex      Rubber gloves     Xarelto [Rivaroxaban] Hives, Swelling and Dermatitis    Morphine      HEADACHE, DROPS BLOOD PRESSURE      Seasonal     Adhesive Tape Hives         Current Outpatient Medications:     oxyCODONE-acetaminophen (PERCOCET) 5-325 MG per tablet, TAKE 1 TABLET BY MOUTH EVERY 6 HOURS AS NEEDED *TO LAST 30 DAYS*, Disp: , Rfl:     predniSONE (DELTASONE) 10 MG tablet, TAKE 1 TABLET BY MOUTH TWICE A DAY, Disp: , Rfl:     enoxaparin (LOVENOX) 150 MG/ML injection, INJECT 1 SYRINGE SUBCUTANEOUSLY EVERY DAY AS DIRECTED, Disp: , Rfl:     cyclobenzaprine (FLEXERIL) 10 MG tablet, TAKE 1 TABLET BY MOUTH EVERY 8 HOURS AS NEEDED, Disp: , Rfl:     alendronate (FOSAMAX) 70 MG tablet, TAKE 1 TABLET BY MOUTH WITH PLAIN WATER ONCE A WEEK 30 MINUTES BEFORE FIRST [employer] OF DAY, Disp: , Rfl:     warfarin (COUMADIN) 2.5 MG tablet, TAKE 2 TABLETS BY MOUTH ON SUNDAY AND THURSDAY, 1 AND 1/2 TABLETS ALL OTHER DAYS OR AS DIRECTED, Disp: , Rfl:     carvedilol (COREG) 3.125 MG tablet, Take 1 tablet by mouth 2 times daily (with meals), Disp: 60 tablet, Rfl: 3    nitroGLYCERIN (NITROSTAT) 0.4 MG SL tablet, Place 0.4 mg under the tongue every 5 minutes as needed for Chest pain up to max of 3 total doses. If no relief after 1 dose, call 911., Disp: , Rfl:     Cyanocobalamin (VITAMIN B-12) 1000 MCG SUBL, Take 1 tablet by mouth daily, Disp: , Rfl: 11    calcium-vitamin D (OSCAL-500) 500-200 MG-UNIT per tablet, Take 1 tablet by mouth 2 times daily, Disp: , Rfl:     LORazepam (ATIVAN) 1 MG tablet, Take 1 mg by mouth 3 times daily as needed for Anxiety. , Disp: , Rfl:     omeprazole (PRILOSEC) 40 MG delayed release capsule, Take 40 mg by mouth daily, Disp: , Rfl:     tiZANidine (ZANAFLEX) 4 MG tablet, Take 1 tablet by mouth every 8 hours as needed (muscle spasms), Disp: 90 tablet, Rfl: 0    Blood Pressure Monitoring (BLOOD PRESSURE CUFF) MISC, 1 each by Does not apply route 2 times daily, Disp: 1 each, Rfl: 0    atorvastatin (LIPITOR) 20 MG tablet, Take 1 tablet by mouth nightly, Disp: 90 tablet, Rfl: 5    albuterol (PROVENTIL) (2.5 MG/3ML) 0.083% nebulizer solution, INHALE THE CONTENTS OF 1 VIAL VIA NEBULIZER EVERY 6 HOURS AS NEEDED  FOR  WHEEZING (SUBSTITUTED FOR PROVENTIL), Disp: 360 mL, Rfl: 1    Family History   Problem Relation Age of Onset    Heart Disease Mother     Cancer Father         prostate    Cancer Brother         leukemia    Cancer Maternal Grandmother         colon    Cancer Paternal Grandmother         colon    Cancer Brother         PROSTATE    Cancer Brother         LUNG    Asthma Brother        Social History     Socioeconomic History    Marital status:      Spouse name: Not on file    Number of children: Not on file    Years of education: Not on file    Highest education level: Not on file   Occupational History    Occupation: Retired   Tobacco Use    Smoking status: Never Smoker    Smokeless tobacco: Never Used   Vaping Use    Vaping Use: Never used   Substance and Sexual Activity    Alcohol use: No    Drug use: No    Sexual activity: Not on file   Other Topics Concern    Not on file   Social History Narrative    Not on file     Social Determinants of Health     Financial Resource Strain:     Difficulty of Paying Living Expenses:    Food Insecurity:     Worried About [address] in the Last Year:     [address] in the Last Year:    Transportation Needs:     Lack of Transportation (Medical):  Lack of Transportation (Non-Medical):    Physical Activity:     Days of Exercise per Week:     Minutes of Exercise per Session:    Stress:     Feeling of Stress :    Social Connections:     Frequency of Communication with Friends and Family:     Frequency of Social Gatherings with Friends and Family:     Attends Church Services:     Active Member of Clubs or Organizations:     Attends Club or Organization Meetings:     Marital Status:    Intimate Partner Violence:     Fear of Current or Ex-Partner:     Emotionally Abused:     Physically Abused:     Sexually Abused:        Review of Systems:  Review of Systems   Constitutional: Negative for chills and fever. Cardiovascular: Negative for chest pain and palpitations. Respiratory: Negative for cough and shortness of breath. Musculoskeletal: Positive for back pain and neck pain. Gastrointestinal: Negative for constipation. Neurological: Negative for disturbances in coordination and loss of balance. Physical Exam:  [facility] 10/05/1986 (Approximate)     Physical Exam  Neurological:      Mental Status: She is alert.    Psychiatric:         Mood and Affect: Mood normal.         Record/Diagnostics Review:    As reviewed in PMH    Assessment:  Problem

## 2021-10-12 ENCOUNTER — HOSPITAL ENCOUNTER (OUTPATIENT)
Age: 71
Discharge: HOME | End: 2021-10-14
Payer: MEDICARE

## 2021-10-12 ENCOUNTER — HOSPITAL ENCOUNTER (OUTPATIENT)
Dept: GENERAL RADIOLOGY | Age: 71
Discharge: HOME | End: 2021-10-14
Payer: MEDICARE

## 2021-10-12 DIAGNOSIS — T14.90XA INJURY: ICD-10-CM

## 2021-10-12 PROCEDURE — 73610 X-RAY EXAM OF ANKLE: CPT

## 2021-10-12 ASSESSMENT — ENCOUNTER SYMPTOMS
BOWEL INCONTINENCE: 0
COUGH: 0
RESPIRATORY NEGATIVE: 1
ABDOMINAL DISTENTION: 0
GASTROINTESTINAL NEGATIVE: 1
EYES NEGATIVE: 1
PHOTOPHOBIA: 0
BACK PAIN: 1
VOMITING: 0
CONSTIPATION: 0
EYE REDNESS: 0
EYE PAIN: 0
ALLERGIC/IMMUNOLOGIC NEGATIVE: 1
ABDOMINAL PAIN: 0
NAUSEA: 0
SINUS PAIN: 0
SHORTNESS OF BREATH: 0
EYE DISCHARGE: 0

## 2021-10-12 NOTE — PROGRESS NOTES
Aubrey Lane is a 73 y.o. female evaluated on 10/19/2021. Modality of virtual service provided -via telephone   Consent:  Patient and/or health care decision maker is aware that that patient may receive a bill for this telephone service, depending on one's insurance coverage, and has provided verbal consent to proceed: Yes    Patient identification was verified at the start of the visit: Yes    Chief complaint: Aubrey Lane is 73 y.o.,  female, with  with chief complaint of pain involving neck and low back. .    Patient is complaining of pain involving the cervical area as well as in the low back. Patient's pain in the low back is worse she had undergone cervical epidural steroid injection on 9/13/2021 with about 50% improvement in the pain. Patient reports at times in her arms feel numb. Patient still having considerable pain in the cervical region. Patient was previously evaluated by Dr. Zaynab Chaudhary who recommended to try a cervical epidural steroid injections at C5-6 and then C4-5 and if they do not work to try medial branch blocks for the facet joints C3-4. Patient had a lumbar fusion done in 2017 in the past.  She also had a cervical fusion at C6-7. Neck Pain   This is a chronic problem. The current episode started more than 1 year ago. The problem occurs constantly. The problem has been gradually worsening. The pain is associated with nothing. Pain location: At the base of the neck. The quality of the pain is described as aching. The pain is at a severity of 10/10 (4-10). The pain is moderate. The symptoms are aggravated by stress, position, bending and twisting (ADLs and lifting). The pain is same all the time. Associated symptoms include chest pain and numbness. Pertinent negatives include no photophobia, tingling or weakness. Back Pain  This is a chronic problem. The current episode started more than 1 year ago. The problem occurs constantly.  The problem has been gradually worsening since onset. The pain is present in the lumbar spine and sacro-iliac. The quality of the pain is described as aching. The pain radiates to the left foot and right foot. The pain is at a severity of 6/10. The pain is moderate. The pain is the same all the time. The symptoms are aggravated by bending, position, twisting and coughing (ADLs, lifting and walking). Associated symptoms include chest pain and numbness. Pertinent negatives include no abdominal pain, bladder incontinence, bowel incontinence, dysuria, tingling or weakness. Risk factors include lack of exercise and sedentary lifestyle. Alleviating factors:rest   Lifestyle changes experienced with pain: Keeps awake at night, Wakes from sleep, Prevents or limits ADLs, Increases w/activity. Constant  Mood changes,none  Patient currently unemployed. Physical therapy did not help the pain. Are you under psychological counseling at present: No  Goals for treatment include:  Decrease in pain  Enjoy daily and recreational activities, return to previous status. Last procedure was  [facility] -9/13/21    Patient relates current medications are helping the pain. Patient reports taking pain medications as prescribed, denies obtaining medications from different sources and denies use of illegal drugs. Patient denies side effects from medications like nausea, vomiting, constipation or drowsiness. Patient reports current activities of daily living ar possible due to medications and would like to continue them.        ACTIVITY/SOCIAL/EMOTIONAL:  Sleep Pattern: 6 hours per night. nightime awakenings  Home Exercises:  no regular exercise  Activity:unchanged  Emotional Issues: normal.   Currently seeing a Psychiatrist or Psychologist:  No     ADVERSE MEDICATION EFFECTS:   Nausea and vomiting: no   Constipation: no-Undercontrol-: yes  Dizziness/drowsy/sleepy--no  Urinary Retention: no    ABERRANT BEHAVIORS SINCE LAST VISIT  Lost rx/pills:------------------------------------------ no  Taking  medication as prescribed: ----------- yes  Urine Drug Screen ---------------------------------  yes             Date------------------------------------------------ 3/26/2019              Results as expected ---------------------yes    Recent ER visits: -------------------------------------No  Pill count is appropriate: ---------------------------yes   Refills for prescriptions appropriate:---------- has enough pills at this time      Past Medical History:   Diagnosis Date    Anxiety     Arthritis     Asthma     Blood circulation, collateral     CAD (coronary artery disease)     Chronic back pain     Depression     YEARS AGO NO RECENT PROBLEMS    Difficult intravenous access     AT TIMES    Disease of blood and blood forming organ     Diverticulitis     DVT (deep venous thrombosis) ([facility])     Right upper arm    Dyslexia     GERD (gastroesophageal reflux disease) 2014    ON RX    Headache(784.0)     Hx of blood clots 2001    LT UPPER LEG TREATED WITH INJECTIONS THEN ORAL BLOOD THINNER FOR 1 YR    Hyperlipidemia 2014    ON RX    Hypertension 2014    ON RX    Migraine     Mitral valve prolapse 1970    NO SYMPTOMS    Movement disorder     Neuromuscular disorder ([address]     Sleep apnea 2006    ON MACHINE HAD SURGERY-NOW RESOLVED    Wears dentures     UPPER    Wears glasses        Past Surgical History:   Procedure Laterality Date    BACK SURGERY      LUMBAR UNSURE WHEN    BACK SURGERY  10/17/2016    hardware removal, revision posterior instrumentation    CATARACT REMOVAL Bilateral 03/2021    both eyes    CERVICAL FUSION  2013    Anterior    COLONOSCOPY  7/16/2012    normal    HYSTERECTOMY  1986    TOTAL    SIGMOIDOSCOPY  6/20/2012    normal    SINUS SURGERY      SKIN CANCER EXCISION  06/2020    SPINE SURGERY  2005    LUMBAR    TONSILLECTOMY  2002    UPPP, TURBINOPLASY    UPPER GASTROINTESTINAL ENDOSCOPY  8/14/2013    [place] done, see note, hiatal hernia, Grade 1 erosive esophagitis    VARICOSE VEIN SURGERY Bilateral        Family History   Problem Relation Age of Onset    Heart Disease Mother     Cancer Father         prostate    Cancer Brother         leukemia    Cancer Maternal Grandmother         colon    Cancer Paternal Grandmother         colon    Cancer Brother         PROSTATE    Cancer Brother         LUNG    Asthma Brother        Social History     Socioeconomic History    Marital status:      Spouse name: Not on file    Number of children: Not on file    Years of education: Not on file    Highest education level: Not on file   Occupational History    Occupation: Retired   Tobacco Use    Smoking status: Never Smoker    Smokeless tobacco: Never Used   Vaping Use    Vaping Use: Never used   Substance and Sexual Activity    Alcohol use: No    Drug use: No    Sexual activity: Not on file   Other Topics Concern    Not on file   Social History Narrative    Not on file     Social Determinants of Health     Financial Resource Strain:     Difficulty of Paying Living Expenses:    Food Insecurity:     Worried About [address] [employer] in the Last Year:     [address] [employer] in the Last Year:    Transportation Needs:     Lack of Transportation (Medical):  Lack of Transportation (Non-Medical):    Physical Activity:     Days of Exercise per Week:     Minutes of Exercise per Session:    Stress:     Feeling of Stress :    Social Connections:     Frequency of Communication with Friends and Family:     Frequency of Social Gatherings with Friends and Family:     Attends Mandaeism Services:     Active Member of Clubs or Organizations:     Attends Club or Organization Meetings:     Marital Status:    Intimate Partner Violence:     Fear of Current or Ex-Partner:     Emotionally Abused:     Physically Abused:     Sexually Abused:         Allergies   Allergen Reactions    Latex      Rubber gloves     OF 1 VIAL VIA NEBULIZER EVERY 6 HOURS AS NEEDED  FOR  WHEEZING (SUBSTITUTED FOR PROVENTIL) 360 mL 1     No current facility-administered medications on file prior to encounter. Review of Systems   Constitutional: Positive for fatigue. Negative for activity change, appetite change and unexpected weight change. HENT: Negative for congestion, hearing loss, sinus pain and sore throat. Eyes: Negative. Negative for photophobia, pain, discharge, redness and visual disturbance. Respiratory: Negative. Negative for cough and shortness of breath. Cardiovascular: Positive for chest pain. Negative for palpitations. Gastrointestinal: Negative. Negative for abdominal distention, abdominal pain, bowel incontinence, constipation, nausea and vomiting. Endocrine: Negative. Negative for cold intolerance, polyphagia and polyuria. Genitourinary: Negative. Negative for bladder incontinence, dysuria and hematuria. Musculoskeletal: Positive for arthralgias, back pain, myalgias and neck pain. Skin: Negative. Negative for pallor and rash. Allergic/Immunologic: Negative. Neurological: Positive for numbness. Negative for tingling and weakness. Hematological: Bruises/bleeds easily. Psychiatric/Behavioral: Positive for sleep disturbance. Negative for self-injury and suicidal ideas. The patient is not nervous/anxious. There is no change in the review of systems since her last visit    Physical Exam  Skin:         Neurological:      Mental Status: She is alert and oriented to person, place, and time.    Psychiatric:         Mood and Affect: Mood normal.        Ortho Exam     DATA:  LAB.:  3/29/2019  9:48 PM - Reinaldo, [facility] Incoming Lab Results From [facility]    Component Value Ref Range & Units Status Collected Lab   Pain Management Drug Panel Interp, Urine Inconsistent   Final 03/26/2019 12:00 PM [facility]   (NOTE)   ________________________________________________________________   DRUGS EXPECTED:   HYDROCODONE [3/25/19]   ________________________________________________________________   CONSISTENT with medications provided:   HYDROCODONE : based on hydrocodone, norhydrocodone   ________________________________________________________________   INCONSISTENT with medications provided:   Lorazepam      9/13/2021 11:04 AM - ReinaldoJenn Incoming Lab Results From [facility]    Component Value Ref Range & Units Status Collected Lab   Protime 13.3  11.8 - 14.6 sec Final 09/13/2021 10:46 AM [facility]   INR 1.0   Final 09/13/2021 10:46 AM [facility]             7/1/2020  3:00 PM - ReinaldoJenn Incoming Lab Results From [facility]    Component Value Ref Range & Units Status Collected Lab   Glucose 127High   70 - 99 mg/dL Final 07/01/2020  2:28 PM [facility] Lab   BUN 9  8 - 23 mg/dL Final 07/01/2020  2:28 PM [facility] Lab   CREATININE 0.51  0.50 - 0.90 mg/dL Final 07/01/2020  2:28 PM [address] [place] [address] Lab   Bun/Cre Ratio 18  9 - 20 Final 07/01/2020  2:28 PM [facility] Lab   Calcium 8.9  8.6 - 10.4 mg/dL Final 07/01/2020  2:28 PM [address] [place] [address] Lab   Sodium 140  135 - 144 mmol/L Final 07/01/2020  2:28 PM [address] [place] [address] Lab   Potassium 3.6Low   3.7 - 5.3 mmol/L Final 07/01/2020  2:28 PM [facility] Lab   Chloride 104  98 - 107 mmol/L Final 07/01/2020  2:28 PM [facility]   CO2 24  20 - 31 mmol/L Final 07/01/2020  2:28 PM [facility] Lab   Anion Gap 12  9 - 17 mmol/L Final 07/01/2020  2:28 PM [facility]   Alkaline Phosphatase 94  35 - 104 U/L Final 07/01/2020  2:28 PM [facility] Lab   ALT 16  5 - 33 U/L Final 07/01/2020  2:28 PM [facility] Lab   AST 23  <32 U/L Final 07/01/2020  2:28 PM [facility]   Total Bilirubin 0.33  0.3 - 1.2 mg/dL Final 07/01/2020  2:28 PM [facility] Lab   Total Protein 6.9  6.4 - 8.3 g/dL Final 07/01/2020  2:28 PM [facility] Lab   Albumin 4.2  3.5 - 5.2 g/dL Final 07/01/2020  2:28 PM [facility]   Albumin/Globulin Ratio 1.6  1.0 - 2.5 Final 07/01/2020  2:28 PM [facility] Lab   GFR Non-African American >60  >60 mL/min Final 07/01/2020  2:28 PM [facility]   GFR African American >60  >60 mL/min Final 07/01/2020  2:28 PM [facility]   GFR Comment        Final         X-Ray reports:  Narrative    Indication: Neck pain on the left.  Left shoulder pain.  Milder right neck pain. TECHNIQUE: Multiplanar multi sequential pre and postcontrast MR imaging of the cervical spine is performed utilizing 17 mL IV ProHance contrast medium.  No prior comparison. FINDINGS: Vertebral body heights and alignment are well-maintained.  Postsurgical change noted anteriorly at C6-7.  Cerebellar tonsils show a normal location.  Apart from some endplate degenerative change, marrow signal appears normal.  Axial images are obtained throughout. At C2-3 Central canal and neural foramina appear patent.  There is mild left neural foraminal narrowing. At C3-4 there is some no significant central canal narrowing.  At least moderate left neural foraminal narrowing noted. At C4-5 there is mild central canal narrowing due to broad-based disc bulge which abuts the ventral cervical spinal cord and may cause mild mass effect upon it.  There is some moderate right and mild left neural foraminal narrowing at this level.  Cord signal remains normal.     At C5-6 there is again noted mild central canal narrowing due to broad-based disc bulge.  Bulge abuts ventral cervical spinal cord.  Dorsal CSF is effaced at this level.  Minimal ventral flattening of cervical spinal cord noted.  Cord signal appears normal.  Neural foramina are widely patent. At C6-7 Central canal and neural foramina appear patent.  Small left perineural cyst noted showing no enhancement.      C7-T1 central canal appears patent.  Small left perineural cyst also noted at this levels showing no enhancement. IMPRESSION:   1.  Mild central canal narrowing at C4-5 and C5-C6 due to broad-based disc.  There may be minimal ventral flattening of cervical spinal cord at these levels. 2.  Some neural foraminal narrowing on the left as described above. Workstation:[id number]     Finalized by Alivia Villarreal MD on 1/19/2021 11:44 AM      REPORT: MRI lumbar spine       TECHNIQUE: Multiplanar multisequence magnetic resonance imaging of the lumbar spine performed without contrast.       INDICATION: Chronic midline low back pain with bilateral sciatica       FINDINGS: Patient is status post L2-L5 pedicle screw and nic fusion with posterior decompressive laminectomy. The hardware is causing severe metallic susceptibility artifact which completely obscures the lumbar spine from L1-L2 to L5-S1. The    thoracolumbar junction is degenerated but within normal limits. Impression   Overall nondiagnostic examination of the lumbar spine. Consider CT as an alternative. Final report electronically signed by Suly Ariza on 6/22/2016      Lumbar spine 2 views 10/17/2016   Clinical history: 53-year-old female, status post fusion L2-L5, L5-S1, left L5 ileum   AP lateral views lumbar spine are obtained portably in the PACU. Comparison made to intraoperative films 4/17/2016 previous study 4/11/2016   There are midline posterior skin staples. Osteopenia with slight dextroconvex curvature lumbar spine. Extensive previous back surgery with posterior decompression and previous L5-S1 fusion with intervertebral disc prostheses. There are now pedicle screws  at L2, L4, L5, S1, and a screw in the left ilium with posterior fusion rods. Subtle lucency in the right posterior iliac spine is likely related to bone graft harvest site and appears similar. Impression:   Recent postoperative changes with lumbosacral fusion as noted above.    Final report electronically signed by Josefina Rodas M.D. on 10/17/2016 EXAMINATION:   CT OF THE ABDOMEN AND PELVIS WITH CONTRAST 7/1/2020 3:17 pm       TECHNIQUE:   CT of the abdomen and pelvis was performed with the administration of   intravenous contrast. Multiplanar reformatted images are provided for review. Dose modulation, iterative reconstruction, and/or weight based adjustment of   the mA/kV was utilized to reduce the radiation dose to as low as reasonably   achievable. COMPARISON:   CT chest/abdomen/pelvis 10/29/2016. CT abdomen/pelvis 04/19/2015. HISTORY:   ORDERING SYSTEM PROVIDED HISTORY: [facility] abd pain   TECHNOLOGIST PROVIDED HISTORY:   q abd pain           FINDINGS:   Thorax base:  Normal heart size with no pericardial effusion. Probable small   sliding-type hiatal hernia. Mild bibasilar atelectasis. No pleural   effusion. Multiple scattered calcified granulomas. Abdomen: Moderate abdominal aortic atherosclerosis with no aneurysm. Diffuse hepatic steatosis with more focal steatosis adjacent to the falciform   ligament. No suspicious intrahepatic mass or biliary dilatation. The portal   vasculature is patent. The gallbladder, common bile duct, pancreas, spleen,   and adrenals are normal.  The kidneys are symmetric with no hydronephrosis or   acute abnormality. The stomach, small bowel, and appendix are normal.  Left hemicolonic   diverticulosis. No significant focal pericolonic inflammatory changes. No ascites or free air. No abscess. Pelvis: The bladder and rectum are normal.  No ascites or abscess. Prior   hysterectomy. Musculoskeletal structures:  No significant enlarged inguinal lymph nodes. Bilateral gluteus medius/minimus symmetric atrophy. Prior lumbar laminectomy   and posterior fusion. Right iliac bone graft harvest site. Normal pelvic   alignment with no acute abnormality. Mild symmetric hip arthropathy. [place]   sutures of the greater trochanters suggesting prior gluteus attachment   repair. continue to have discussions to decrease pain medications as tolerated. I also discussed with the patient regarding the dangers of combining narcotic pain medication with tranquilizers, alcohol or illegal drugs or taking the medication any other than prescribed. The dangers including the respiratory depression and death. Patient was told to tell  to all  physicians regarding the medications he is getting from pain clinic. Patient is warned not to take any unprescribed medications over-the-counter medications that can depress breathing . Patient is advised to talk to the pharmacist or physicians if planning to take any over-the-counter medications before  takeing them. Patient is strongly advised to avoid tranquilizers or  Relaxants for any medications that can depress breathing or recreational drugs. Patient is also advised to tell us if there is any changes in his medications from other physicians. We discussed the same at today's visit and have not been to implement it, as the patient's pain is not under control with current medications. The following treatment plan was developed after discussion with patient:  We discussed cervical Epidural steroid Injections x 0  at C4 and C5  Patient tried and failed NSAIDS,Home exercises, Physical Therapy, Chiropractic manipulations without relief. Patient exhibited signs of radiculopathy with positive cervical traction  test on bilaterally  Patient has prior cervical Surgery. We will see the patient in 2 weeks after the procedures and re-evaluate symptoms.       Orders Placed This Encounter   Procedures    Cervical/Thoracic Epidural Steroid Injection     Standing Status:   Future     Standing Expiration Date:   10/19/2022    FLUORO FOR SURGICAL PROCEDURES     Standing Status:   Future     Standing Expiration Date:   10/19/2022    Saline lock IV     Standing Status:   Future     Standing Expiration Date:   4/19/2023       Decision Making Process : Patient's health history and referral records thoroughly reviewed before focused physical examination and discussion with patient. Over 50% of today's visit is spent on examining the patient and counseling and coordinating the care. Level of complexity of date to be reviewed is Moderate. The chart date reviewed include the following: Imaging Reports. Summary of Care. Time spent reviewing with patient the below reports:   Medication safety, Treatment options. Level of diagnosis and management options of this case is multiple: involving the following management options: Interventions as needed, medication management as appropriate, future visits, activity modification, heat/ice as needed, Urine drug screen as required. [x]The patient's questions were answered to the best of my abilities. This note was created using voice recognition software. There may be inaccuracies of transcription  that are inadvertently overlooked prior to the signature. There is any questions about the transcription please contact me. Due to the COVID-19 pandemic and the appropriate interventions by Chris Sanchez, our non-urgent pain management patients will not be seen in the office at this time for their protection and the protection of our staff. To offer continuity of care, their prescriptions will be escribed this month after a careful chart review and review of their OARRS report  Pursuant to the emergency declaration under the [address] and [facility] [address] waiver authority and the [employer] Act, this Virtual Visit was conducted, with patient's consent, to reduce the patient's risk of exposure to COVID-19 and provide continuity of care for an established patient. Services were provided through a video synchronous discussion virtually to substitute for in-person appointment. \"  Documentation:  I communicated with the patient and/or health care decision maker about plan of care  Details of this discussion including any medical advice provided: Total Time: minutes: 21-30 minutes    I affirm this is a Patient Initiated Episode with an Established Patient who has not had a related appointment within my department in the past 7 days or scheduled within the next 24 hours.     Electronically signed by Deysi Smalls MD on 10/19/2021 at 1:47 PM

## 2021-10-19 ENCOUNTER — HOSPITAL ENCOUNTER (OUTPATIENT)
Dept: PAIN MANAGEMENT | Age: 71
Discharge: HOME | End: 2021-10-19
Payer: MEDICARE

## 2021-10-19 DIAGNOSIS — M51.36 DDD (DEGENERATIVE DISC DISEASE), LUMBAR: ICD-10-CM

## 2021-10-19 DIAGNOSIS — Z98.1 S/P CERVICAL SPINAL FUSION: ICD-10-CM

## 2021-10-19 DIAGNOSIS — M54.12 CERVICAL RADICULOPATHY: ICD-10-CM

## 2021-10-19 DIAGNOSIS — M54.16 LUMBAR RADICULAR PAIN: ICD-10-CM

## 2021-10-19 DIAGNOSIS — Z98.1 S/P LUMBAR FUSION: ICD-10-CM

## 2021-10-19 DIAGNOSIS — Z98.1 STATUS POST LUMBAR SPINAL FUSION: ICD-10-CM

## 2021-10-19 DIAGNOSIS — M96.1 FAILED BACK SYNDROME: ICD-10-CM

## 2021-10-19 DIAGNOSIS — M50.30 DDD (DEGENERATIVE DISC DISEASE), CERVICAL: ICD-10-CM

## 2021-10-19 DIAGNOSIS — M43.02 CERVICAL SPONDYLOLYSIS: Primary | ICD-10-CM

## 2021-10-19 DIAGNOSIS — M96.1 CERVICAL POST-LAMINECTOMY SYNDROME: ICD-10-CM

## 2021-10-19 DIAGNOSIS — M47.812 CERVICAL SPONDYLOSIS: ICD-10-CM

## 2021-10-19 DIAGNOSIS — M47.816 LUMBAR FACET ARTHROPATHY: ICD-10-CM

## 2021-10-19 PROCEDURE — 99213 OFFICE O/P EST LOW 20 MIN: CPT

## 2021-10-19 PROCEDURE — 99214 OFFICE O/P EST MOD 30 MIN: CPT | Performed by: PAIN MEDICINE

## 2021-10-19 RX ORDER — SODIUM CHLORIDE, SODIUM LACTATE, POTASSIUM CHLORIDE, CALCIUM CHLORIDE 600; 310; 30; 20 MG/100ML; MG/100ML; MG/100ML; MG/100ML
75 INJECTION, SOLUTION INTRAVENOUS CONTINUOUS
Status: DISCONTINUED | OUTPATIENT
Start: 2021-10-19 | End: 2021-10-20 | Stop reason: HOSPADM

## 2021-10-19 ASSESSMENT — ENCOUNTER SYMPTOMS: SORE THROAT: 0

## 2021-10-23 DIAGNOSIS — D68.9 COAGULATION DEFECT, UNSPECIFIED (HCC): ICD-10-CM

## 2021-10-23 DIAGNOSIS — Z01.812 PRE-PROCEDURE LAB EXAM: Primary | ICD-10-CM

## 2021-11-09 NOTE — PRE-PROCEDURE INSTRUCTIONS
Spoke with patient regarding procedure and date and time. Patient relates has preprocedure instructions given to her and has directions on anticoagulant therapy from Anticoagulant clinic. Patient to have lab done prior to appointment. Denies questions.

## 2021-11-11 ENCOUNTER — HOSPITAL ENCOUNTER (OUTPATIENT)
Dept: PAIN MANAGEMENT | Age: 71
Discharge: HOME | End: 2021-11-11
Payer: MEDICARE

## 2021-11-11 ENCOUNTER — HOSPITAL ENCOUNTER (OUTPATIENT)
Age: 71
Discharge: HOME | End: 2021-11-11
Payer: MEDICARE

## 2021-11-11 ENCOUNTER — HOSPITAL ENCOUNTER (OUTPATIENT)
Dept: GENERAL RADIOLOGY | Age: 71
Discharge: HOME | End: 2021-11-13
Payer: MEDICARE

## 2021-11-11 VITALS
WEIGHT: 190 LBS | TEMPERATURE: 97.7 F | RESPIRATION RATE: 20 BRPM | HEIGHT: 64 IN | DIASTOLIC BLOOD PRESSURE: 70 MMHG | SYSTOLIC BLOOD PRESSURE: 131 MMHG | BODY MASS INDEX: 32.44 KG/M2 | OXYGEN SATURATION: 98 % | HEART RATE: 82 BPM

## 2021-11-11 DIAGNOSIS — M47.812 CERVICAL SPONDYLOSIS: ICD-10-CM

## 2021-11-11 DIAGNOSIS — M50.30 DDD (DEGENERATIVE DISC DISEASE), CERVICAL: ICD-10-CM

## 2021-11-11 DIAGNOSIS — M96.1 CERVICAL POST-LAMINECTOMY SYNDROME: ICD-10-CM

## 2021-11-11 DIAGNOSIS — M43.02 CERVICAL SPONDYLOLYSIS: ICD-10-CM

## 2021-11-11 DIAGNOSIS — D68.9 COAGULATION DEFECT, UNSPECIFIED (HCC): ICD-10-CM

## 2021-11-11 DIAGNOSIS — Z01.812 PRE-PROCEDURE LAB EXAM: ICD-10-CM

## 2021-11-11 DIAGNOSIS — M54.12 CERVICAL RADICULOPATHY: ICD-10-CM

## 2021-11-11 DIAGNOSIS — M54.12 CERVICAL RADICULOPATHY: Primary | ICD-10-CM

## 2021-11-11 LAB
INR BLD: 1
PROTHROMBIN TIME: 13.7 SEC (ref 11.8–14.6)

## 2021-11-11 PROCEDURE — 3209999900 FLUORO FOR SURGICAL PROCEDURES

## 2021-11-11 PROCEDURE — 85610 PROTHROMBIN TIME: CPT

## 2021-11-11 PROCEDURE — 6360000004 HC RX CONTRAST MEDICATION: Performed by: PAIN MEDICINE

## 2021-11-11 PROCEDURE — 36415 COLL VENOUS BLD VENIPUNCTURE: CPT

## 2021-11-11 PROCEDURE — 62321 NJX INTERLAMINAR CRV/THRC: CPT | Performed by: PAIN MEDICINE

## 2021-11-11 PROCEDURE — 62321 NJX INTERLAMINAR CRV/THRC: CPT

## 2021-11-11 PROCEDURE — 6360000002 HC RX W HCPCS: Performed by: PAIN MEDICINE

## 2021-11-11 PROCEDURE — 2580000003 HC RX 258: Performed by: PAIN MEDICINE

## 2021-11-11 RX ORDER — SODIUM CHLORIDE, SODIUM LACTATE, POTASSIUM CHLORIDE, CALCIUM CHLORIDE 600; 310; 30; 20 MG/100ML; MG/100ML; MG/100ML; MG/100ML
75 INJECTION, SOLUTION INTRAVENOUS CONTINUOUS
Status: DISCONTINUED | OUTPATIENT
Start: 2021-11-11 | End: 2021-11-12 | Stop reason: HOSPADM

## 2021-11-11 RX ORDER — BETAMETHASONE SODIUM PHOSPHATE AND BETAMETHASONE ACETATE 3; 3 MG/ML; MG/ML
INJECTION, SUSPENSION INTRA-ARTICULAR; INTRALESIONAL; INTRAMUSCULAR; SOFT TISSUE
Status: COMPLETED | OUTPATIENT
Start: 2021-11-11 | End: 2021-11-11

## 2021-11-11 RX ORDER — MIDAZOLAM HYDROCHLORIDE 1 MG/ML
INJECTION INTRAMUSCULAR; INTRAVENOUS
Status: COMPLETED | OUTPATIENT
Start: 2021-11-11 | End: 2021-11-11

## 2021-11-11 RX ADMIN — BETAMETHASONE SODIUM PHOSPHATE AND BETAMETHASONE ACETATE 12 MG: 3; 3 INJECTION, SUSPENSION INTRA-ARTICULAR; INTRALESIONAL; INTRAMUSCULAR at 09:29

## 2021-11-11 RX ADMIN — SODIUM CHLORIDE, POTASSIUM CHLORIDE, SODIUM LACTATE AND CALCIUM CHLORIDE 75 ML/HR: 600; 310; 30; 20 INJECTION, SOLUTION INTRAVENOUS at 09:43

## 2021-11-11 RX ADMIN — IOHEXOL 1 ML: 180 INJECTION INTRAVENOUS at 09:29

## 2021-11-11 RX ADMIN — MIDAZOLAM 2 MG: 1 INJECTION INTRAMUSCULAR; INTRAVENOUS at 09:15

## 2021-11-11 ASSESSMENT — PAIN DESCRIPTION - FREQUENCY: FREQUENCY: CONTINUOUS

## 2021-11-11 ASSESSMENT — PAIN DESCRIPTION - DESCRIPTORS: DESCRIPTORS: ACHING;SHARP;STABBING

## 2021-11-11 ASSESSMENT — PAIN SCALES - GENERAL: PAINLEVEL_OUTOF10: 10

## 2021-11-11 ASSESSMENT — PAIN DESCRIPTION - PAIN TYPE: TYPE: CHRONIC PAIN

## 2021-11-11 ASSESSMENT — PAIN DESCRIPTION - LOCATION: LOCATION: BACK;NECK;ARM;LEG

## 2021-11-11 ASSESSMENT — PAIN DESCRIPTION - PROGRESSION: CLINICAL_PROGRESSION: GRADUALLY WORSENING

## 2021-11-11 ASSESSMENT — PAIN DESCRIPTION - ORIENTATION: ORIENTATION: LEFT;RIGHT

## 2021-11-11 ASSESSMENT — PAIN DESCRIPTION - ONSET: ONSET: GRADUAL

## 2021-11-11 ASSESSMENT — PAIN - FUNCTIONAL ASSESSMENT: PAIN_FUNCTIONAL_ASSESSMENT: PREVENTS OR INTERFERES SOME ACTIVE ACTIVITIES AND ADLS

## 2021-11-11 NOTE — PROGRESS NOTES
Discharge criteria met. Post procedure dressing dry and intact. Sensory and motor function intact as per pre-procedure. Patient alert and oriented x3  Instructions and follow up reviewed with pt at patient at discharge. Discharged home wih . Taken to car via wheelchair.    Discharged @  1010

## 2021-11-11 NOTE — PROCEDURES
Pre-Procedure Note    Patient Name: Victoria Zamarripa   YOB: 1950  Room/Bed: Room/bed info not found  Medical Record Number: [id number]  Date: 11/11/2021       Indication:    1. Cervical radiculopathy    2. DDD (degenerative disc disease), cervical    3. Cervical spondylosis  S/P Cervical fusion    4. Cervical post-laminectomy syndrome    5. Cervical spondylolysis        Consent: On file. Vital Signs:   Vitals:    11/11/21 0929   BP: 124/80   Pulse: 74   Resp: 21   Temp:    SpO2: 96%       Past Medical History:   has a past medical history of Anxiety, Arthritis, Asthma, Blood circulation, collateral, CAD (coronary artery disease), Chronic back pain, Depression, Difficult intravenous access, Disease of blood and blood forming organ, Diverticulitis, DVT (deep venous thrombosis) (HCC), Dyslexia, GERD (gastroesophageal reflux disease), Headache(784.0), Hx of blood clots, Hyperlipidemia, Hypertension, Migraine, Mitral valve prolapse, Movement disorder, Neuromuscular disorder ([address] Sleep apnea, Wears dentures, and Wears glasses. Past Surgical History:   has a past surgical history that includes Sigmoidoscopy (6/20/2012); Spine surgery (2005); Tonsillectomy (2002); Varicose vein surgery (Bilateral); cervical fusion (2013); sinus surgery; Upper gastrointestinal endoscopy (8/14/2013); Hysterectomy (1986); Colonoscopy (7/16/2012); back surgery; back surgery (10/17/2016); Skin cancer excision (06/2020); and Cataract removal (Bilateral, 03/2021). Pre-Sedation Documentation and Exam:   Vital signs have been reviewed (see flow sheet for vitals). Mallampati Airway Assessment:  normal    ASA Classification:  Class 3 - A patient with severe systemic disease that limits activity but is not incapacitating    Sedation/ Anesthesia Plan:   intravenous sedation  as needed. Medications Planned:   midazolam (Versed) / Fentanyl  Intravenously  as needed.     Patient is an appropriate candidate for plan of sedation: yes  Patient's History and Physical examination was reviewed and there is no change. Electronically signed by Brennan Jeffrey MD on 11/11/2021 at 9:40 AM      Preoperative Diagnosis:  1. Cervical radiculopathy    2. DDD (degenerative disc disease), cervical    3. Cervical spondylosis  S/P Cervical fusion    4. Cervical post-laminectomy syndrome    5. Cervical spondylolysis        Postoperative Diagnosis:    1. Cervical radiculopathy    2. DDD (degenerative disc disease), cervical    3. Cervical spondylosis  S/P Cervical fusion    4. Cervical post-laminectomy syndrome    5. Cervical spondylolysis        Procedure Performed: Cervical epidural steroid injection under fluoroscopic guidance with IV sedation    Indication for the Procedure:  Patient failed conservative management  for pain in neck radiating to upper extremities. Patient is undergoing cervical epidural steroid injections and the last procedure gave 50% relief. As patient is not responding to conservative management and interfering with activities of daily living we decided to proceed with Cervical  Epidural Steroid  injection. The procedure and risks were discussed with the patient and an informed consent was obtained.    Current Pain Assessment  Pain Assessment  Pain Assessment: 0-10  Pain Level: 10  Patient's Stated Pain Goal: 2 (increase activity)  Pain Type: Chronic pain  Pain Location: Back, Neck, Arm, Leg  Pain Orientation: Left, Right  Pain Radiating Towards: bilat sides arms  Pain Descriptors: Aching, Sharp, Stabbing  Pain Frequency: Continuous  Pain Onset: Gradual  Clinical Progression: Gradually worsening  Effect of Pain on Daily Activities: increases when laying down  Functional Pain Assessment: Prevents or interferes some active activities and ADLs  Non-Pharmaceutical Pain Intervention(s): Repositioned, Rest, Heat applied  RASS Score: Alert and calm  POSS Score (Patient Ctrl Analgesia): 1   Procedure:  After starting IV, the patient

## 2021-11-12 ENCOUNTER — TELEPHONE (OUTPATIENT)
Dept: PAIN MANAGEMENT | Age: 71
End: 2021-11-12

## 2021-11-29 ENCOUNTER — HOSPITAL ENCOUNTER (OUTPATIENT)
Dept: PAIN MANAGEMENT | Age: 71
Discharge: HOME | End: 2021-11-29
Payer: MEDICARE

## 2021-11-29 DIAGNOSIS — M51.36 DDD (DEGENERATIVE DISC DISEASE), LUMBAR: ICD-10-CM

## 2021-11-29 DIAGNOSIS — M43.02 CERVICAL SPONDYLOLYSIS: ICD-10-CM

## 2021-11-29 DIAGNOSIS — M50.30 DDD (DEGENERATIVE DISC DISEASE), CERVICAL: ICD-10-CM

## 2021-11-29 DIAGNOSIS — M54.12 CERVICAL RADICULOPATHY: ICD-10-CM

## 2021-11-29 DIAGNOSIS — M47.812 CERVICAL SPONDYLOSIS: Primary | ICD-10-CM

## 2021-11-29 PROCEDURE — 99213 OFFICE O/P EST LOW 20 MIN: CPT

## 2021-11-29 PROCEDURE — 99213 OFFICE O/P EST LOW 20 MIN: CPT | Performed by: NURSE PRACTITIONER

## 2021-11-29 ASSESSMENT — ENCOUNTER SYMPTOMS
CONSTIPATION: 0
COUGH: 0
SHORTNESS OF BREATH: 0

## 2021-11-29 NOTE — PROGRESS NOTES
Patient completed a telephone visit today for follow up after procedure    Chief Complaint: neck pain    PMH Pt complains of neck pain. XR cervical spine with Mild multilevel disc narrowing and endplate osteophyte formation and mild multilevel uncovertebral and facet joint degenerative changes. She saw Dr. Bobo Lomeli and he recommend cervical epidural and cervical MBB. He recommends MCKENZIE at C4-5, C5-6 first then cervical MBB C3-4.     She had cervical epidural 9/13/21 and 11/11/21 reports 50% relief. Neck Pain   This is a chronic problem. The current episode started more than 1 year ago. The problem occurs intermittently. The problem has been unchanged. The pain is present in the midline. The quality of the pain is described as aching. The pain is at a severity of 4/10. The pain is mild. The symptoms are aggravated by position and twisting. Pertinent negatives include no fever. She has tried oral narcotics and bed rest for the symptoms. The treatment provided mild relief. Patient denies any new neurological symptoms. No bowel or bladder incontinence, no weakness, and no falling. Periodic Controlled Substance Monitoring: Possible medication side effects, risk of tolerance/dependence & alternative treatments discussed., No signs of potential drug abuse or diversion identified., Obtaining appropriate analgesic effect of treatment.  (Severo Booze, APRN - CNP)      Past Medical History:   Diagnosis Date    Anxiety     Arthritis     Asthma     Blood circulation, collateral     CAD (coronary artery disease)     Chronic back pain     Depression     YEARS AGO NO RECENT PROBLEMS    Difficult intravenous access     AT TIMES    Disease of blood and blood forming organ     Diverticulitis     DVT [facility]deep [facility] thrombosis) ([facility])     Right upper arm    Dyslexia     GERD (gastroesophageal reflux disease) 2014    ON RX    Headache(784.0)     Hx of blood clots 2001    LT UPPER LEG TREATED WITH INJECTIONS THEN ORAL BLOOD THINNER FOR 1 YR    Hyperlipidemia 2014    ON RX    Hypertension 2014    ON RX    Migraine     Mitral valve prolapse 1970    NO SYMPTOMS    Movement disorder     Neuromuscular disorder ([facility]     Sleep apnea 2006    ON MACHINE HAD SURGERY-NOW RESOLVED    Wears dentures     UPPER    Wears glasses        Past Surgical History:   Procedure Laterality Date    BACK SURGERY      LUMBAR UNSURE WHEN    BACK SURGERY  10/17/2016    hardware removal, revision posterior instrumentation    CATARACT REMOVAL Bilateral 03/2021    both eyes    CERVICAL FUSION  2013    Anterior    COLONOSCOPY  7/16/2012    normal    HYSTERECTOMY  1986    TOTAL    SIGMOIDOSCOPY  6/20/2012    normal    SINUS SURGERY      SKIN CANCER EXCISION  06/2020    SPINE SURGERY  2005    LUMBAR    TONSILLECTOMY  2002    UPPP, TURBINOPLASY    UPPER GASTROINTESTINAL ENDOSCOPY  8/14/2013    Bravo done, see note, hiatal hernia, Grade 1 erosive esophagitis    VARICOSE VEIN SURGERY Bilateral        Allergies   Allergen Reactions    Latex      Rubber gloves     Xarelto [Rivaroxaban] Hives, Swelling and Dermatitis    Morphine      HEADACHE, DROPS BLOOD PRESSURE      Seasonal     Adhesive Tape Hives         Current Outpatient Medications:     oxyCODONE-acetaminophen (PERCOCET) 5-325 MG per tablet, TAKE 1 TABLET BY MOUTH EVERY 6 HOURS AS NEEDED *TO LAST 30 DAYS*, Disp: , Rfl:     predniSONE (DELTASONE) 10 MG tablet, TAKE 1 TABLET BY MOUTH TWICE A DAY, Disp: , Rfl:     enoxaparin (LOVENOX) 150 MG/ML injection, INJECT 1 SYRINGE SUBCUTANEOUSLY EVERY DAY AS DIRECTED, Disp: , Rfl:     cyclobenzaprine (FLEXERIL) 10 MG tablet, TAKE 1 TABLET BY MOUTH EVERY 8 HOURS AS NEEDED, Disp: , Rfl:     alendronate (FOSAMAX) 70 MG tablet, TAKE 1 TABLET BY MOUTH WITH PLAIN WATER ONCE A WEEK 30 MINUTES BEFORE FIRST FOOD,DRINK,OR RX OF DAY, Disp: , Rfl:     warfarin (COUMADIN) 2.5 MG tablet, TAKE 2 TABLETS BY MOUTH ON SUNDAY AND THURSDAY, 1 AND 1/2 TABLETS ALL OTHER DAYS OR AS DIRECTED, Disp: , Rfl:     carvedilol (COREG) 3.125 MG tablet, Take 1 tablet by mouth 2 times daily (with meals), Disp: 60 tablet, Rfl: 3    nitroGLYCERIN (NITROSTAT) 0.4 MG SL tablet, Place 0.4 mg under the tongue every 5 minutes as needed for Chest pain up to max of 3 total doses. If no relief after 1 dose, call 911., Disp: , Rfl:     Cyanocobalamin (VITAMIN B-12) 1000 MCG SUBL, Take 1 tablet by mouth daily, Disp: , Rfl: 11    calcium-vitamin D (OSCAL-500) 500-200 MG-UNIT per tablet, Take 1 tablet by mouth 2 times daily, Disp: , Rfl:     LORazepam (ATIVAN) 1 MG tablet, Take 1 mg by mouth 3 times daily as needed for Anxiety. , Disp: , Rfl:     omeprazole (PRILOSEC) 40 MG delayed release capsule, Take 40 mg by mouth daily, Disp: , Rfl:     tiZANidine (ZANAFLEX) 4 MG tablet, Take 1 tablet by mouth every 8 hours as needed (muscle spasms), Disp: 90 tablet, Rfl: 0    Blood Pressure Monitoring (BLOOD PRESSURE CUFF) MISC, 1 each by Does not apply route 2 times daily, Disp: 1 each, Rfl: 0    atorvastatin (LIPITOR) 20 MG tablet, Take 1 tablet by mouth nightly, Disp: 90 tablet, Rfl: 5    albuterol (PROVENTIL) (2.5 MG/3ML) 0.083% nebulizer solution, INHALE THE CONTENTS OF 1 VIAL VIA NEBULIZER EVERY 6 HOURS AS NEEDED  FOR  WHEEZING (SUBSTITUTED FOR PROVENTIL), Disp: 360 mL, Rfl: 1    Family History   Problem Relation Age of Onset    Heart Disease Mother     Cancer Father         prostate    Cancer Brother         leukemia    Cancer Maternal Grandmother         colon    Cancer Paternal Grandmother         colon    Cancer Brother         PROSTATE    Cancer Brother         LUNG    Asthma Brother        Social History     Socioeconomic History    Marital status:      Spouse name: Not on file    Number of children: Not on file    Years of education: Not on file    Highest education level: Not on file   Occupational History    Occupation: Retired   Tobacco Use    Smoking status: Never Smoker    Smokeless tobacco: Never Used   Vaping Use    Vaping Use: Never used   Substance and Sexual Activity    Alcohol use: No    Drug use: No    Sexual activity: Not on file   Other Topics Concern    Not on file   Social History Narrative    Not on file     Social Determinants of Health     Financial Resource Strain:     Difficulty of Paying Living Expenses: Not on file   Food Insecurity:     Worried About Running Out of Food in the Last Year: Not on file    [place] of Food in the Last Year: Not on file   Transportation Needs:     Lack of Transportation (Medical): Not on file    Lack of Transportation (Non-Medical): Not on file   Physical Activity:     Days of Exercise per Week: Not on file    Minutes of Exercise per Session: Not on file   Stress:     Feeling of Stress : Not on file   Social Connections:     Frequency of Communication with Friends and Family: Not on file    Frequency of Social Gatherings with Friends and Family: Not on file    Attends Restorationism Services: Not on file    Active Member of [address] [place] [address] or Organizations: Not on file    Attends Club or Organization Meetings: Not on file    Marital Status: Not on file   Intimate Partner Violence:     Fear of Current or Ex-Partner: Not on file    Emotionally Abused: Not on file    Physically Abused: Not on file    Sexually Abused: Not on file   Housing Stability:     Unable to Pay for Housing in the Last Year: Not on file    Number of [place] in the Last Year: Not on file    Unstable Housing in the Last Year: Not on file       Review of Systems:  Review of Systems   Constitutional: Negative for chills and fever. Cardiovascular: Negative for palpitations. Occasional chest pain   Respiratory: Negative for cough and shortness of breath. Musculoskeletal: Positive for neck pain. Gastrointestinal: Negative for constipation.    Neurological: Negative for disturbances in coordination and loss of

## 2022-05-18 ENCOUNTER — TELEPHONE (OUTPATIENT)
Dept: ORTHOPEDIC SURGERY | Age: 72
End: 2022-05-18

## 2022-05-18 NOTE — TELEPHONE ENCOUNTER
Patient had XR cervical 4/27/22, [facility] called and images have pushed into [facility] system

## 2022-05-24 ENCOUNTER — OFFICE VISIT (OUTPATIENT)
Dept: ORTHOPEDIC SURGERY | Age: 72
End: 2022-05-24
Payer: MEDICARE

## 2022-05-24 VITALS — WEIGHT: 189 LBS | BODY MASS INDEX: 32.27 KG/M2 | HEIGHT: 64 IN | RESPIRATION RATE: 14 BRPM

## 2022-05-24 DIAGNOSIS — M25.552 BILATERAL HIP PAIN: Primary | ICD-10-CM

## 2022-05-24 DIAGNOSIS — M25.552 LEFT HIP PAIN: ICD-10-CM

## 2022-05-24 DIAGNOSIS — M25.551 BILATERAL HIP PAIN: Primary | ICD-10-CM

## 2022-05-24 PROCEDURE — 99203 OFFICE O/P NEW LOW 30 MIN: CPT | Performed by: ORTHOPAEDIC SURGERY

## 2022-05-24 NOTE — PROGRESS NOTES
Patient ID: Sudhakar Steen is a 81 y.o. female    Chief Compliant:  Chief Complaint   Patient presents with    New Patient     B/L hip pain        Diagnostic imaging:  AP pelvis status post lumbar fixation to the left sacrum this is known to extend to L1 status post apparent gluteal repair on the left moderate bilateral hip arthritis    MRI cervical spine 2021 some moderate stenosis C4-5 5 6 history of 6 7 anterior cervical discectomy and fusion          Assessment and Plan:  1. Bilateral hip pain      Patient was somewhat chaotic myelopathic gait significant waddling      Left slightly greater than right buttock and hip pain aggravated by hip ROM    Refer for IR hip injection    Follow up same day as IR injection. HPI:  This is a 81 y.o. female who presents to the clinic today as a new patient for bilateral hip evaluation. Hx cervical surgery with Dr. Luis Ragland over 5 years    Patient with bilateral hip and buttock, left slightly greater than right. Patient's gait is short strided, chaotic and wide based, has been ongoing for 3 years. Patient's spine progressively forward flexes throughout the day. Review of Systems   All other systems reviewed and are negative.       Past History:    Current Outpatient Medications:     oxyCODONE-acetaminophen (PERCOCET) 5-325 MG per tablet, TAKE 1 TABLET BY MOUTH EVERY 6 HOURS AS NEEDED *TO LAST 30 DAYS*, Disp: , Rfl:     predniSONE (DELTASONE) 10 MG tablet, TAKE 1 TABLET BY MOUTH TWICE A DAY, Disp: , Rfl:     enoxaparin (LOVENOX) 150 MG/ML injection, INJECT 1 SYRINGE SUBCUTANEOUSLY EVERY DAY AS DIRECTED, Disp: , Rfl:     cyclobenzaprine (FLEXERIL) 10 MG tablet, TAKE 1 TABLET BY MOUTH EVERY 8 HOURS AS NEEDED, Disp: , Rfl:     alendronate (FOSAMAX) 70 MG tablet, TAKE 1 TABLET BY MOUTH WITH PLAIN WATER ONCE A WEEK 30 MINUTES BEFORE FIRST FOOD,DRINK,OR RX OF DAY, Disp: , Rfl:     warfarin (COUMADIN) 2.5 MG tablet, TAKE 2 TABLETS BY MOUTH ON SUNDAY AND THURSDAY, 1 AND 1/2 TABLETS ALL OTHER DAYS OR AS DIRECTED, Disp: , Rfl:     carvedilol (COREG) 3.125 MG tablet, Take 1 tablet by mouth 2 times daily (with meals), Disp: 60 tablet, Rfl: 3    nitroGLYCERIN (NITROSTAT) 0.4 MG SL tablet, Place 0.4 mg under the tongue every 5 minutes as needed for Chest pain up to max of 3 total doses. If no relief after 1 dose, call 911., Disp: , Rfl:     Cyanocobalamin (VITAMIN B-12) 1000 MCG SUBL, Take 1 tablet by mouth daily, Disp: , Rfl: 11    calcium-vitamin D (OSCAL-500) 500-200 MG-UNIT per tablet, Take 1 tablet by mouth 2 times daily, Disp: , Rfl:     LORazepam (ATIVAN) 1 MG tablet, Take 1 mg by mouth 3 times daily as needed for Anxiety. , Disp: , Rfl:     omeprazole (PRILOSEC) 40 MG delayed release capsule, Take 40 mg by mouth daily, Disp: , Rfl:     tiZANidine (ZANAFLEX) 4 MG tablet, Take 1 tablet by mouth every 8 hours as needed (muscle spasms), Disp: 90 tablet, Rfl: 0    Blood Pressure Monitoring (BLOOD PRESSURE CUFF) MISC, 1 each by Does not apply route 2 times daily, Disp: 1 each, Rfl: 0    atorvastatin (LIPITOR) 20 MG tablet, Take 1 tablet by mouth nightly, Disp: 90 tablet, Rfl: 5    albuterol (PROVENTIL) (2.5 MG/3ML) 0.083% nebulizer solution, INHALE THE CONTENTS OF 1 VIAL VIA NEBULIZER EVERY 6 HOURS AS NEEDED  FOR  WHEEZING (SUBSTITUTED FOR PROVENTIL), Disp: 360 mL, Rfl: 1  Allergies   Allergen Reactions    Latex      Rubber gloves     Xarelto [Rivaroxaban] Hives, Swelling and Dermatitis    Morphine      HEADACHE, DROPS BLOOD PRESSURE      Seasonal     Adhesive Tape Hives     Social History     Socioeconomic History    Marital status:      Spouse name: Not on file    Number of children: Not on file    Years of education: Not on file    Highest education level: Not on file   Occupational History    Occupation: Retired   Tobacco Use    Smoking status: Never Smoker    Smokeless tobacco: Never Used   Vaping Use    Vaping Use: Never used   Substance and Sexual Activity    Alcohol use: No    Drug use: No    Sexual activity: Not on file   Other Topics Concern    Not on file   Social History Narrative    Not on file     Social Determinants of Health     Financial Resource Strain:     Difficulty of Paying Living Expenses: Not on file   Food Insecurity:     Worried About Running Out of Food in the Last Year: Not on file    [place] of Food in the Last Year: Not on file   Transportation Needs:     Lack of Transportation (Medical): Not on file    Lack of Transportation (Non-Medical):  Not on file   Physical Activity:     Days of Exercise per Week: Not on file    Minutes of Exercise per Session: Not on file   Stress:     Feeling of Stress : Not on file   Social Connections:     Frequency of Communication with Friends and Family: Not on file    Frequency of Social Gatherings with Friends and Family: Not on file    Attends Adventism Services: Not on file    Active Member of [address] [place] [address] [employer] or Organizations: Not on file    Attends Club or Organization Meetings: Not on file    Marital Status: Not on file   Intimate Partner Violence:     Fear of Current or Ex-Partner: Not on file    Emotionally Abused: Not on file    Physically Abused: Not on file    Sexually Abused: Not on file   Housing Stability:     Unable to Pay for Housing in the Last Year: Not on file    Number of [place] in the Last Year: Not on file    Unstable Housing in the Last Year: Not on file     Past Medical History:   Diagnosis Date    Anxiety     Arthritis     Asthma     Blood circulation, collateral     CAD (coronary artery disease)     Chronic back pain     Depression     YEARS AGO NO RECENT PROBLEMS    Difficult intravenous access     AT TIMES    Disease of blood and blood forming organ     Diverticulitis     D[facility] ([facility])     Right upper arm    Dyslexia     GERD (gastroesophageal reflux disease) 2014    ON RX    Headache(784.0)     Hx of blood clots 2001 LT UPPER LEG TREATED WITH INJECTIONS THEN ORAL BLOOD THINNER FOR 1 YR    Hyperlipidemia 2014    ON RX    Hypertension 2014    ON RX    Migraine     Mitral valve prolapse 1970    NO SYMPTOMS    Movement disorder     Neuromuscular disorder ([address]     Sleep apnea 2006    ON MACHINE HAD SURGERY-NOW RESOLVED    Wears dentures     UPPER    Wears glasses      Past Surgical History:   Procedure Laterality Date    BACK SURGERY      LUMBAR UNSURE WHEN    BACK SURGERY  10/17/2016    hardware removal, revision posterior instrumentation    CATARACT REMOVAL Bilateral 03/2021    both eyes    CERVICAL FUSION  2013    Anterior    COLONOSCOPY  7/16/2012    normal    HYSTERECTOMY  1986    TOTAL    SIGMOIDOSCOPY  6/20/2012    normal    SINUS SURGERY      SKIN CANCER EXCISION  06/2020    SPINE SURGERY  2005    LUMBAR    TONSILLECTOMY  2002    UPPP, TURBINOPLASY    UPPER GASTROINTESTINAL ENDOSCOPY  8/14/2013    Bravo done, see note, hiatal hernia, Grade 1 erosive esophagitis    VARICOSE VEIN SURGERY Bilateral      Family History   Problem Relation Age of Onset    Heart Disease Mother     Cancer Father         prostate    Cancer Brother         leukemia    Cancer Maternal Grandmother         colon    Cancer Paternal Grandmother         colon    Cancer Brother         PROSTATE    Cancer Brother         LUNG    Asthma Brother         Physical Exam:  Vitals signs and nursing note reviewed. Constitutional:       Appearance: well-developed. HENT:      Head: Normocephalic and atraumatic. Nose: Nose normal.   Eyes:      Conjunctiva/sclera: Conjunctivae normal.   Neck:      Musculoskeletal: Normal range of motion and neck supple. Pulmonary:      Effort: Pulmonary effort is normal. No respiratory distress. Musculoskeletal:      Comments: Normal gait     Skin:     General: Skin is warm and dry. Neurological:      Mental Status: Alert and oriented to person, place, and time.       Sensory: No sensory deficit. Psychiatric:         Behavior: Behavior normal.         Thought Content: Thought content normal.    Wide based short strided gait    Hip ROM aggravates lower back and buttock pain. Provider Attestation:  Peggy Inman, personally performed the services described in this documentation. All medical record entries made by the scribe were at my direction and in my presence. I have reviewed the chart and discharge instructions and agree that the records reflect my personal performance and is accurate and complete. Lamonte Tripp MD 5/24/22       Scribe Attestation:  By signing my name below, Randolphimerrosa m Sal, attest that this documentation has been prepared under the direction and in the presence of Dr. Kai Nails. Electronically signed: Nikki Hurt, 5/24/22     Please note that this chart was generated using voice recognition Dragon dictation software. Although every effort was made to ensure the accuracy of this automated transcription, some errors in transcription may have occurred.

## 2022-06-09 ENCOUNTER — HOSPITAL ENCOUNTER (OUTPATIENT)
Dept: INTERVENTIONAL RADIOLOGY/VASCULAR | Age: 72
Discharge: HOME | End: 2022-06-11
Payer: MEDICARE

## 2022-06-09 ENCOUNTER — OFFICE VISIT (OUTPATIENT)
Dept: ORTHOPEDIC SURGERY | Age: 72
End: 2022-06-09
Payer: MEDICARE

## 2022-06-09 VITALS — RESPIRATION RATE: 14 BRPM | WEIGHT: 189 LBS | BODY MASS INDEX: 32.27 KG/M2 | HEIGHT: 64 IN

## 2022-06-09 DIAGNOSIS — M25.552 LEFT HIP PAIN: ICD-10-CM

## 2022-06-09 DIAGNOSIS — M25.552 LEFT HIP PAIN: Primary | ICD-10-CM

## 2022-06-09 DIAGNOSIS — M25.551 BILATERAL HIP PAIN: ICD-10-CM

## 2022-06-09 DIAGNOSIS — M25.552 BILATERAL HIP PAIN: ICD-10-CM

## 2022-06-09 PROCEDURE — 20610 DRAIN/INJ JOINT/BURSA W/O US: CPT

## 2022-06-09 PROCEDURE — 99213 OFFICE O/P EST LOW 20 MIN: CPT | Performed by: ORTHOPAEDIC SURGERY

## 2022-06-09 PROCEDURE — 2500000003 HC RX 250 WO HCPCS: Performed by: ORTHOPAEDIC SURGERY

## 2022-06-09 PROCEDURE — 77002 NEEDLE LOCALIZATION BY XRAY: CPT

## 2022-06-09 PROCEDURE — 6360000002 HC RX W HCPCS: Performed by: ORTHOPAEDIC SURGERY

## 2022-06-09 PROCEDURE — 1123F ACP DISCUSS/DSCN MKR DOCD: CPT | Performed by: ORTHOPAEDIC SURGERY

## 2022-06-09 PROCEDURE — 6360000004 HC RX CONTRAST MEDICATION: Performed by: ORTHOPAEDIC SURGERY

## 2022-06-09 RX ORDER — BUPIVACAINE HYDROCHLORIDE 5 MG/ML
4 INJECTION, SOLUTION EPIDURAL; INTRACAUDAL ONCE
Status: COMPLETED | OUTPATIENT
Start: 2022-06-09 | End: 2022-06-09

## 2022-06-09 RX ORDER — BUPIVACAINE HYDROCHLORIDE 5 MG/ML
4 INJECTION, SOLUTION EPIDURAL; INTRACAUDAL ONCE
Status: CANCELLED | OUTPATIENT
Start: 2022-06-09 | End: 2022-06-09

## 2022-06-09 RX ORDER — LIDOCAINE HYDROCHLORIDE 10 MG/ML
4 INJECTION, SOLUTION EPIDURAL; INFILTRATION; INTRACAUDAL; PERINEURAL ONCE
Status: CANCELLED | OUTPATIENT
Start: 2022-06-09 | End: 2022-06-09

## 2022-06-09 RX ORDER — METHYLPREDNISOLONE ACETATE 80 MG/ML
80 INJECTION, SUSPENSION INTRA-ARTICULAR; INTRALESIONAL; INTRAMUSCULAR; SOFT TISSUE ONCE
Status: CANCELLED | OUTPATIENT
Start: 2022-06-09 | End: 2022-06-09

## 2022-06-09 RX ORDER — METHYLPREDNISOLONE ACETATE 80 MG/ML
80 INJECTION, SUSPENSION INTRA-ARTICULAR; INTRALESIONAL; INTRAMUSCULAR; SOFT TISSUE ONCE
Status: COMPLETED | OUTPATIENT
Start: 2022-06-09 | End: 2022-06-09

## 2022-06-09 RX ORDER — LIDOCAINE HYDROCHLORIDE 10 MG/ML
4 INJECTION, SOLUTION EPIDURAL; INFILTRATION; INTRACAUDAL; PERINEURAL ONCE
Status: COMPLETED | OUTPATIENT
Start: 2022-06-09 | End: 2022-06-09

## 2022-06-09 RX ADMIN — BUPIVACAINE HYDROCHLORIDE 20 MG: 5 INJECTION, SOLUTION PERINEURAL at 14:14

## 2022-06-09 RX ADMIN — IOHEXOL 2 ML: 240 INJECTION, SOLUTION INTRATHECAL; INTRAVASCULAR; INTRAVENOUS; ORAL at 14:18

## 2022-06-09 RX ADMIN — LIDOCAINE HYDROCHLORIDE 4 ML: 10 INJECTION, SOLUTION EPIDURAL; INFILTRATION; INTRACAUDAL; PERINEURAL at 14:14

## 2022-06-09 RX ADMIN — METHYLPREDNISOLONE ACETATE 80 MG: 80 INJECTION, SUSPENSION INTRA-ARTICULAR; INTRALESIONAL; INTRAMUSCULAR; SOFT TISSUE at 14:14

## 2022-06-09 ASSESSMENT — PAIN SCALES - GENERAL: PAINLEVEL_OUTOF10: 0

## 2022-06-09 NOTE — PROGRESS NOTES
Patient ID: Melvin Villafana is a 81 y.o. female    Chief Compliant:  Chief Complaint   Patient presents with    Hip Pain     left        Diagnostic imaging:        Assessment and Plan:  1. Left hip pain    2. Bilateral hip pain      50% relief with IR left hip injection still not completely convinced patient's pain is hip joint in origin    We will consider ordering MRI at the next visit    Follow up 3 weeks    HPI:  This is a 81 y.o. female who presents to the clinic today for bilateral hip pain. Patient underwent IR hip injection today with about 50% relief. Patient reports her walking and standing has improved since injection. Review of Systems   All other systems reviewed and are negative. Past History:    Current Outpatient Medications:     oxyCODONE-acetaminophen (PERCOCET) 5-325 MG per tablet, TAKE 1 TABLET BY MOUTH EVERY 6 HOURS AS NEEDED *TO LAST 30 DAYS*, Disp: , Rfl:     predniSONE (DELTASONE) 10 MG tablet, TAKE 1 TABLET BY MOUTH TWICE A DAY, Disp: , Rfl:     enoxaparin (LOVENOX) 150 MG/ML injection, INJECT 1 SYRINGE SUBCUTANEOUSLY EVERY DAY AS DIRECTED, Disp: , Rfl:     cyclobenzaprine (FLEXERIL) 10 MG tablet, TAKE 1 TABLET BY MOUTH EVERY 8 HOURS AS NEEDED, Disp: , Rfl:     alendronate (FOSAMAX) 70 MG tablet, TAKE 1 TABLET BY MOUTH WITH PLAIN WATER ONCE A WEEK 30 MINUTES BEFORE FIRST FOOD,DRINK,OR RX OF DAY, Disp: , Rfl:     warfarin (COUMADIN) 2.5 MG tablet, TAKE 2 TABLETS BY MOUTH ON SUNDAY AND THURSDAY, 1 AND 1/2 TABLETS ALL OTHER DAYS OR AS DIRECTED, Disp: , Rfl:     carvedilol (COREG) 3.125 MG tablet, Take 1 tablet by mouth 2 times daily (with meals), Disp: 60 tablet, Rfl: 3    nitroGLYCERIN (NITROSTAT) 0.4 MG SL tablet, Place 0.4 mg under the tongue every 5 minutes as needed for Chest pain up to max of 3 total doses.  If no relief after 1 dose, call 911., Disp: , Rfl:     Cyanocobalamin (VITAMIN B-12) 1000 MCG SUBL, Take 1 tablet by mouth daily, Disp: , Rfl: 11   calcium-vitamin D (OSCAL-500) 500-200 MG-UNIT per tablet, Take 1 tablet by mouth 2 times daily, Disp: , Rfl:     LORazepam (ATIVAN) 1 MG tablet, Take 1 mg by mouth 3 times daily as needed for Anxiety. , Disp: , Rfl:     omeprazole (PRILOSEC) 40 MG delayed release capsule, Take 40 mg by mouth daily, Disp: , Rfl:     tiZANidine (ZANAFLEX) 4 MG tablet, Take 1 tablet by mouth every 8 hours as needed (muscle spasms), Disp: 90 tablet, Rfl: 0    Blood Pressure Monitoring (BLOOD PRESSURE CUFF) MISC, 1 each by Does not apply route 2 times daily, Disp: 1 each, Rfl: 0    atorvastatin (LIPITOR) 20 MG tablet, Take 1 tablet by mouth nightly, Disp: 90 tablet, Rfl: 5    albuterol (PROVENTIL) (2.5 MG/3ML) 0.083% nebulizer solution, INHALE THE CONTENTS OF 1 VIAL VIA NEBULIZER EVERY 6 HOURS AS NEEDED  FOR  WHEEZING (SUBSTITUTED FOR PROVENTIL), Disp: 360 mL, Rfl: 1  Allergies   Allergen Reactions    Latex      Rubber gloves     Xarelto [Rivaroxaban] Hives, Swelling and Dermatitis    Morphine      HEADACHE, DROPS BLOOD PRESSURE      Seasonal     Adhesive Tape Hives     Social History     Socioeconomic History    Marital status:      Spouse name: Not on file    Number of children: Not on file    Years of education: Not on file    Highest education level: Not on file   Occupational History    Occupation: Retired   Tobacco Use    Smoking status: Never Smoker    Smokeless tobacco: Never Used   Vaping Use    Vaping Use: Never used   Substance and Sexual Activity    Alcohol use: No    Drug use: No    Sexual activity: Not on file   Other Topics Concern    Not on file   Social History Narrative    Not on file     Social Determinants of Health     Financial Resource Strain:     Difficulty of Paying Living Expenses: Not on file   Food Insecurity:     Worried About Running Out of Food in the Last Year: Not on file    [place] of Food in the Last Year: Not on file   Transportation Needs:     Lack of Transportation (Medical): Not on file    Lack of Transportation (Non-Medical):  Not on file   Physical Activity:     Days of Exercise per Week: Not on file    Minutes of Exercise per Session: Not on file   Stress:     Feeling of Stress : Not on file   Social Connections:     Frequency of Communication with Friends and Family: Not on file    Frequency of Social Gatherings with Friends and Family: Not on file    Attends Jainism Services: Not on file    Active Member of [address] [place] [address] [employer] or Organizations: Not on file    Attends Club or Organization Meetings: Not on file    Marital Status: Not on file   Intimate Partner Violence:     Fear of Current or Ex-Partner: Not on file    Emotionally Abused: Not on file    Physically Abused: Not on file    Sexually Abused: Not on file   Housing Stability:     Unable to Pay for Housing in the Last Year: Not on file    Number of [place] in the Last Year: Not on file    Unstable Housing in the Last Year: Not on file     Past Medical History:   Diagnosis Date    Anxiety     Arthritis     Asthma     Blood circulation, collateral     CAD (coronary artery disease)     Chronic back pain     Depression     YEARS AGO NO RECENT PROBLEMS    Difficult intravenous access     AT TIMES    Disease of blood and blood forming organ     Diverticulitis     DVT (deep venous thrombosis) (HCC)     Right upper arm    Dyslexia     GERD (gastroesophageal reflux disease) 2014    ON RX    Headache(784.0)     Hx of blood clots 2001    LT UPPER LEG TREATED WITH INJECTIONS THEN ORAL BLOOD THINNER FOR 1 YR    Hyperlipidemia 2014    ON RX    Hypertension 2014    ON RX    Migraine     Mitral valve prolapse 1970    NO SYMPTOMS    Movement disorder     Neuromuscular disorder ([facility] [address]     Sleep apnea 2006    ON MACHINE HAD SURGERY-NOW RESOLVED    Wears dentures     UPPER    Wears glasses      Past Surgical History:   Procedure Laterality Date    BACK SURGERY      LUMBAR UNSURE WHEN    BACK SURGERY 10/17/2016    hardware removal, revision posterior instrumentation    CATARACT REMOVAL Bilateral 03/2021    both eyes    CERVICAL FUSION  2013    Anterior    COLONOSCOPY  7/16/2012    normal    HYSTERECTOMY  1986    TOTAL    SIGMOIDOSCOPY  6/20/2012    normal    SINUS SURGERY      SKIN CANCER EXCISION  06/2020    SPINE SURGERY  2005    LUMBAR    TONSILLECTOMY  2002    UPPP, TURBINOPLASY    UPPER GASTROINTESTINAL ENDOSCOPY  8/14/2013    Bravo done, see note, hiatal hernia, Grade 1 erosive esophagitis    VARICOSE VEIN SURGERY Bilateral      Family History   Problem Relation Age of Onset    Heart Disease Mother     Cancer Father         prostate    Cancer Brother         leukemia    Cancer Maternal Grandmother         colon    Cancer Paternal Grandmother         colon    Cancer Brother         PROSTATE    Cancer Brother         LUNG    Asthma Brother         Physical Exam:  Vitals signs and nursing note reviewed. Constitutional:       Appearance: well-developed. HENT:      Head: Normocephalic and atraumatic. Nose: Nose normal.   Eyes:      Conjunctiva/sclera: Conjunctivae normal.   Neck:      Musculoskeletal: Normal range of motion and neck supple. Pulmonary:      Effort: Pulmonary effort is normal. No respiratory distress. Musculoskeletal:      Comments: Normal gait     Skin:     General: Skin is warm and dry. Neurological:      Mental Status: Alert and oriented to person, place, and time. Sensory: No sensory deficit. Psychiatric:         Behavior: Behavior normal.         Thought Content: Thought content normal.    Lower back pain with left hip abduction to about 40 degrees. Pain with adduction, no pain with left hip IR, ER, or flexion    Provider Attestation:  Savannah Inman, personally performed the services described in this documentation. All medical record entries made by the scribe were at my direction and in my presence.  I have reviewed the chart and discharge instructions and agree that the records reflect my personal performance and is accurate and complete. Alivia Arcos MD 6/9/22       Scribe Attestation:  By signing my name below, Kaela Nikole, attest that this documentation has been prepared under the direction and in the presence of Dr. Hayley Suarez. Electronically signed: Nikki Dc, 6/9/22     Please note that this chart was generated using voice recognition Dragon dictation software. Although every effort was made to ensure the accuracy of this automated transcription, some errors in transcription may have occurred.

## 2022-06-09 NOTE — PROGRESS NOTES
Lt hip prepped draped. Numbed and accessed by Dr Peter Ponce Injected as per [facility]  Needle removed and band aid applied Tolerated well Verbalizes understanding of discharge instructions

## 2022-06-30 ENCOUNTER — OFFICE VISIT (OUTPATIENT)
Dept: ORTHOPEDIC SURGERY | Age: 72
End: 2022-06-30
Payer: MEDICARE

## 2022-06-30 VITALS — HEIGHT: 64 IN | BODY MASS INDEX: 32.27 KG/M2 | RESPIRATION RATE: 14 BRPM | WEIGHT: 189 LBS

## 2022-06-30 DIAGNOSIS — M25.551 BILATERAL HIP PAIN: ICD-10-CM

## 2022-06-30 DIAGNOSIS — M25.552 LEFT HIP PAIN: Primary | ICD-10-CM

## 2022-06-30 DIAGNOSIS — M25.552 BILATERAL HIP PAIN: ICD-10-CM

## 2022-06-30 PROCEDURE — 1123F ACP DISCUSS/DSCN MKR DOCD: CPT | Performed by: ORTHOPAEDIC SURGERY

## 2022-06-30 PROCEDURE — 99213 OFFICE O/P EST LOW 20 MIN: CPT | Performed by: ORTHOPAEDIC SURGERY

## 2022-06-30 NOTE — PROGRESS NOTES
Patient ID: Ilsa Rivera is a 81 y.o. female    Chief Compliant:  Chief Complaint   Patient presents with    Hip Pain        Diagnostic imaging:        Assessment and Plan:  1. Left hip pain    2. Bilateral hip pain      Excellent relief with IR hip injection    Follow up prn    HPI:  This is a 81 y.o. female who presents to the clinic today for bilateral hip pain. Patient notes significant relief with IR hip injection today. Review of Systems   All other systems reviewed and are negative. Past History:    Current Outpatient Medications:     oxyCODONE-acetaminophen (PERCOCET) 5-325 MG per tablet, TAKE 1 TABLET BY MOUTH EVERY 6 HOURS AS NEEDED *TO LAST 30 DAYS*, Disp: , Rfl:     predniSONE (DELTASONE) 10 MG tablet, TAKE 1 TABLET BY MOUTH TWICE A DAY, Disp: , Rfl:     enoxaparin (LOVENOX) 150 MG/ML injection, INJECT 1 SYRINGE SUBCUTANEOUSLY EVERY DAY AS DIRECTED, Disp: , Rfl:     cyclobenzaprine (FLEXERIL) 10 MG tablet, TAKE 1 TABLET BY MOUTH EVERY 8 HOURS AS NEEDED, Disp: , Rfl:     alendronate (FOSAMAX) 70 MG tablet, TAKE 1 TABLET BY MOUTH WITH PLAIN WATER ONCE A WEEK 30 MINUTES BEFORE FIRST FOOD,DRINK,OR RX OF DAY, Disp: , Rfl:     warfarin (COUMADIN) 2.5 MG tablet, TAKE 2 TABLETS BY MOUTH ON SUNDAY AND THURSDAY, 1 AND 1/2 TABLETS ALL OTHER DAYS OR AS DIRECTED, Disp: , Rfl:     carvedilol (COREG) 3.125 MG tablet, Take 1 tablet by mouth 2 times daily (with meals), Disp: 60 tablet, Rfl: 3    nitroGLYCERIN (NITROSTAT) 0.4 MG SL tablet, Place 0.4 mg under the tongue every 5 minutes as needed for Chest pain up to max of 3 total doses. If no relief after 1 dose, call 911., Disp: , Rfl:     Cyanocobalamin (VITAMIN B-12) 1000 MCG SUBL, Take 1 tablet by mouth daily, Disp: , Rfl: 11    calcium-vitamin D (OSCAL-500) 500-200 MG-UNIT per tablet, Take 1 tablet by mouth 2 times daily, Disp: , Rfl:     LORazepam (ATIVAN) 1 MG tablet, Take 1 mg by mouth 3 times daily as needed for Anxiety. , Disp: , Rfl:   omeprazole (PRILOSEC) 40 MG delayed release capsule, Take 40 mg by mouth daily, Disp: , Rfl:     tiZANidine (ZANAFLEX) 4 MG tablet, Take 1 tablet by mouth every 8 hours as needed (muscle spasms), Disp: 90 tablet, Rfl: 0    Blood Pressure Monitoring (BLOOD PRESSURE CUFF) MISC, 1 each by Does not apply route 2 times daily, Disp: 1 each, Rfl: 0    atorvastatin (LIPITOR) 20 MG tablet, Take 1 tablet by mouth nightly, Disp: 90 tablet, Rfl: 5    albuterol (PROVENTIL) (2.5 MG/3ML) 0.083% nebulizer solution, INHALE THE CONTENTS OF 1 VIAL VIA NEBULIZER EVERY 6 HOURS AS NEEDED  FOR  WHEEZING (SUBSTITUTED FOR PROVENTIL), Disp: 360 mL, Rfl: 1  Allergies   Allergen Reactions    Latex      Rubber gloves     Xarelto [Rivaroxaban] Hives, Swelling and Dermatitis    Morphine      HEADACHE, DROPS BLOOD PRESSURE      Seasonal     Adhesive Tape Hives     Social History     Socioeconomic History    Marital status:      Spouse name: Not on file    Number of children: Not on file    Years of education: Not on file    Highest education level: Not on file   Occupational History    Occupation: Retired   Tobacco Use    Smoking status: Never Smoker    Smokeless tobacco: Never Used   Vaping Use    Vaping Use: Never used   Substance and Sexual Activity    Alcohol use: No    Drug use: No    Sexual activity: Not on file   Other Topics Concern    Not on file   Social History Narrative    Not on file     Social Determinants of Health     Financial Resource Strain:     Difficulty of Paying Living Expenses: Not on file   Food Insecurity:     Worried About Running Out of Food in the Last Year: Not on file    [place] of Food in the Last Year: Not on file   Transportation Needs:     Lack of Transportation (Medical): Not on file    Lack of Transportation (Non-Medical):  Not on file   Physical Activity:     Days of Exercise per Week: Not on file    Minutes of Exercise per Session: Not on file   Stress:     Feeling of Stress : Not on file   Social Connections:     Frequency of Communication with Friends and Family: Not on file    Frequency of Social Gatherings with Friends and Family: Not on file    Attends Mosque Services: Not on file    Active Member of Clubs or Organizations: Not on file    Attends Club or Organization Meetings: Not on file    Marital Status: Not on file   Intimate Partner Violence:     Fear of Current or Ex-Partner: Not on file    Emotionally Abused: Not on file    Physically Abused: Not on file    Sexually Abused: Not on file   Housing Stability:     Unable to Pay for Housing in the Last Year: Not on file    Number of [place] in the Last Year: Not on file    Unstable Housing in the Last Year: Not on file     Past Medical History:   Diagnosis Date    Anxiety     Arthritis     Asthma     Blood circulation, collateral     CAD (coronary artery disease)     Chronic back pain     Depression     YEARS AGO NO RECENT PROBLEMS    Difficult intravenous access     AT TIMES    Disease of blood and blood forming organ     Diverticulitis     DVT (deep [facility] thrombosis) ([facility])     Right upper arm    Dyslexia     GERD (gastroesophageal reflux disease) 2014    ON RX    Headache(784.0)     Hx of blood clots 2001    LT UPPER LEG TREATED WITH INJECTIONS THEN ORAL BLOOD THINNER FOR 1 YR    Hyperlipidemia 2014    ON RX    Hypertension 2014    ON RX    Migraine     Mitral valve prolapse 1970    NO SYMPTOMS    Movement disorder     Neuromuscular disorder ([facility] [address]     Sleep apnea 2006    ON MACHINE HAD SURGERY-NOW RESOLVED    Wears dentures     UPPER    Wears glasses      Past Surgical History:   Procedure Laterality Date    BACK SURGERY      LUMBAR UNSURE WHEN    BACK SURGERY  10/17/2016    hardware removal, revision posterior instrumentation    CATARACT REMOVAL Bilateral 03/2021    both eyes    CERVICAL FUSION  2013    Anterior    COLONOSCOPY  7/16/2012    normal    HYSTERECTOMY (CERVIX STATUS UNKNOWN)  1986    TOTAL    SIGMOIDOSCOPY  6/20/2012    normal    SINUS SURGERY      SKIN CANCER EXCISION  06/2020    SPINE SURGERY  2005    LUMBAR    TONSILLECTOMY  2002    UPPP, TURBINOPLASY    UPPER GASTROINTESTINAL ENDOSCOPY  8/14/2013    Bravo done, see note, hiatal hernia, Grade 1 erosive esophagitis    VARICOSE VEIN SURGERY Bilateral      Family History   Problem Relation Age of Onset    Heart Disease Mother     Cancer Father         prostate    Cancer Brother         leukemia    Cancer Maternal Grandmother         colon    Cancer Paternal Grandmother         colon    Cancer Brother         PROSTATE    Cancer Brother         LUNG    Asthma Brother         Physical Exam:  Vitals signs and nursing note reviewed. Constitutional:       Appearance: well-developed. HENT:      Head: Normocephalic and atraumatic. Nose: Nose normal.   Eyes:      Conjunctiva/sclera: Conjunctivae normal.   Neck:      Musculoskeletal: Normal range of motion and neck supple. Pulmonary:      Effort: Pulmonary effort is normal. No respiratory distress. Musculoskeletal:      Comments: Normal gait     Skin:     General: Skin is warm and dry. Neurological:      Mental Status: Alert and oriented to person, place, and time. Sensory: No sensory deficit. Psychiatric:         Behavior: Behavior normal.         Thought Content: Thought content normal.        Provider Attestation:  Corrina Inman personally performed the services described in this documentation. All medical record entries made by the scribe were at my direction and in my presence. I have reviewed the chart and discharge instructions and agree that the records reflect my personal performance and is accurate and complete. Johnny Boone MD 6/30/22       Scribe Attestation:  By signing my name below, Mariela Freeman, attest that this documentation has been prepared under the direction and in the presence of Dr. Thom Darnell Electronically signed: Nikki Rich, 6/30/22     Please note that this chart was generated using voice recognition Dragon dictation software. Although every effort was made to ensure the accuracy of this automated transcription, some errors in transcription may have occurred.

## 2022-08-22 ENCOUNTER — HOSPITAL ENCOUNTER (EMERGENCY)
Age: 72
Discharge: LEFT BEFORE DISCHARGE | End: 2022-08-22

## 2022-08-22 VITALS
DIASTOLIC BLOOD PRESSURE: 67 MMHG | OXYGEN SATURATION: 97 % | HEART RATE: 68 BPM | TEMPERATURE: 97.1 F | RESPIRATION RATE: 12 BRPM | SYSTOLIC BLOOD PRESSURE: 126 MMHG

## 2022-08-22 ASSESSMENT — PAIN - FUNCTIONAL ASSESSMENT: PAIN_FUNCTIONAL_ASSESSMENT: 0-10

## 2022-08-22 ASSESSMENT — PAIN DESCRIPTION - ORIENTATION: ORIENTATION: RIGHT

## 2022-08-22 ASSESSMENT — PAIN DESCRIPTION - LOCATION: LOCATION: HEAD;EAR

## 2022-08-22 ASSESSMENT — PAIN SCALES - GENERAL: PAINLEVEL_OUTOF10: 7

## 2022-11-27 ENCOUNTER — APPOINTMENT (OUTPATIENT)
Dept: GENERAL RADIOLOGY | Age: 72
DRG: 392 | End: 2022-11-27
Payer: MEDICARE

## 2022-11-27 ENCOUNTER — APPOINTMENT (OUTPATIENT)
Dept: CT IMAGING | Age: 72
DRG: 392 | End: 2022-11-27
Payer: MEDICARE

## 2022-11-27 ENCOUNTER — HOSPITAL ENCOUNTER (INPATIENT)
Age: 72
LOS: 2 days | Discharge: HOME | DRG: 392 | End: 2022-11-29
Attending: EMERGENCY MEDICINE | Admitting: FAMILY MEDICINE
Payer: MEDICARE

## 2022-11-27 DIAGNOSIS — R07.9 CHEST PAIN, UNSPECIFIED TYPE: ICD-10-CM

## 2022-11-27 DIAGNOSIS — R55 NEAR SYNCOPE: Primary | ICD-10-CM

## 2022-11-27 LAB
ABSOLUTE EOS #: 0.14 K/UL (ref 0–0.44)
ABSOLUTE EOS #: 0.21 K/UL (ref 0–0.44)
ABSOLUTE IMMATURE GRANULOCYTE: <0.03 K/UL (ref 0–0.3)
ABSOLUTE IMMATURE GRANULOCYTE: <0.03 K/UL (ref 0–0.3)
ABSOLUTE LYMPH #: 1.49 K/UL (ref 1.1–3.7)
ABSOLUTE LYMPH #: 2.37 K/UL (ref 1.1–3.7)
ABSOLUTE MONO #: 0.5 K/UL (ref 0.1–1.2)
ABSOLUTE MONO #: 0.61 K/UL (ref 0.1–1.2)
ANION GAP SERPL CALCULATED.3IONS-SCNC: 11 MMOL/L (ref 9–17)
BACTERIA: ABNORMAL
BASOPHILS # BLD: 1 % (ref 0–2)
BASOPHILS # BLD: 1 % (ref 0–2)
BASOPHILS ABSOLUTE: 0.04 K/UL (ref 0–0.2)
BASOPHILS ABSOLUTE: 0.04 K/UL (ref 0–0.2)
BILIRUBIN URINE: NEGATIVE
BUN BLDV-MCNC: 12 MG/DL (ref 8–23)
BUN/CREAT BLD: 17 (ref 9–20)
CALCIUM SERPL-MCNC: 9.4 MG/DL (ref 8.6–10.4)
CHLORIDE BLD-SCNC: 100 MMOL/L (ref 98–107)
CO2: 26 MMOL/L (ref 20–31)
COLOR: YELLOW
CREAT SERPL-MCNC: 0.69 MG/DL (ref 0.5–0.9)
EOSINOPHILS RELATIVE PERCENT: 2 % (ref 1–4)
EOSINOPHILS RELATIVE PERCENT: 3 % (ref 1–4)
EPITHELIAL CELLS UA: ABNORMAL /HPF (ref 0–25)
GFR SERPL CREATININE-BSD FRML MDRD: >60 ML/MIN/1.73M2
GLUCOSE BLD-MCNC: 107 MG/DL (ref 70–99)
GLUCOSE URINE: NEGATIVE
HCT VFR BLD CALC: 39.7 % (ref 36.3–47.1)
HCT VFR BLD CALC: 40.7 % (ref 36.3–47.1)
HEMOGLOBIN: 13.2 G/DL (ref 11.9–15.1)
HEMOGLOBIN: 13.5 G/DL (ref 11.9–15.1)
IMMATURE GRANULOCYTES: 0 %
IMMATURE GRANULOCYTES: 0 %
INR BLD: 1.6
KETONES, URINE: NEGATIVE
LEUKOCYTE ESTERASE, URINE: NEGATIVE
LYMPHOCYTES # BLD: 24 % (ref 24–43)
LYMPHOCYTES # BLD: 35 % (ref 24–43)
MCH RBC QN AUTO: 29.9 PG (ref 25.2–33.5)
MCH RBC QN AUTO: 30.2 PG (ref 25.2–33.5)
MCHC RBC AUTO-ENTMCNC: 33.2 G/DL (ref 28.4–34.8)
MCHC RBC AUTO-ENTMCNC: 33.2 G/DL (ref 28.4–34.8)
MCV RBC AUTO: 90.2 FL (ref 82.6–102.9)
MCV RBC AUTO: 90.8 FL (ref 82.6–102.9)
MONOCYTES # BLD: 8 % (ref 3–12)
MONOCYTES # BLD: 9 % (ref 3–12)
NITRITE, URINE: NEGATIVE
NRBC AUTOMATED: 0 PER 100 WBC
NRBC AUTOMATED: 0 PER 100 WBC
PDW BLD-RTO: 13.8 % (ref 11.8–14.4)
PDW BLD-RTO: 13.8 % (ref 11.8–14.4)
PH UA: 7 (ref 5–9)
PLATELET # BLD: 228 K/UL (ref 138–453)
PLATELET # BLD: 231 K/UL (ref 138–453)
PMV BLD AUTO: 9.4 FL (ref 8.1–13.5)
PMV BLD AUTO: 9.7 FL (ref 8.1–13.5)
POTASSIUM SERPL-SCNC: 3.9 MMOL/L (ref 3.7–5.3)
PRO-BNP: 23 PG/ML
PROTEIN UA: NEGATIVE
PROTHROMBIN TIME: 19.3 SEC (ref 11.5–14.2)
RBC # BLD: 4.37 M/UL (ref 3.95–5.11)
RBC # BLD: 4.51 M/UL (ref 3.95–5.11)
RBC UA: ABNORMAL /HPF (ref 0–2)
SEG NEUTROPHILS: 52 % (ref 36–65)
SEG NEUTROPHILS: 65 % (ref 36–65)
SEGMENTED NEUTROPHILS ABSOLUTE COUNT: 3.6 K/UL (ref 1.5–8.1)
SEGMENTED NEUTROPHILS ABSOLUTE COUNT: 3.92 K/UL (ref 1.5–8.1)
SODIUM BLD-SCNC: 137 MMOL/L (ref 135–144)
SPECIFIC GRAVITY UA: <1.005 (ref 1.01–1.02)
TROPONIN, HIGH SENSITIVITY: <6 NG/L (ref 0–14)
TROPONIN, HIGH SENSITIVITY: <6 NG/L (ref 0–14)
TURBIDITY: CLEAR
URINE HGB: NEGATIVE
UROBILINOGEN, URINE: NORMAL
WBC # BLD: 6.1 K/UL (ref 3.5–11.3)
WBC # BLD: 6.9 K/UL (ref 3.5–11.3)
WBC UA: ABNORMAL /HPF (ref 0–5)

## 2022-11-27 PROCEDURE — 85025 COMPLETE CBC W/AUTO DIFF WBC: CPT

## 2022-11-27 PROCEDURE — 71260 CT THORAX DX C+: CPT | Performed by: EMERGENCY MEDICINE

## 2022-11-27 PROCEDURE — 6360000004 HC RX CONTRAST MEDICATION: Performed by: EMERGENCY MEDICINE

## 2022-11-27 PROCEDURE — 71046 X-RAY EXAM CHEST 2 VIEWS: CPT

## 2022-11-27 PROCEDURE — 83880 ASSAY OF NATRIURETIC PEPTIDE: CPT

## 2022-11-27 PROCEDURE — 93005 ELECTROCARDIOGRAM TRACING: CPT | Performed by: EMERGENCY MEDICINE

## 2022-11-27 PROCEDURE — 84484 ASSAY OF TROPONIN QUANT: CPT

## 2022-11-27 PROCEDURE — 80048 BASIC METABOLIC PNL TOTAL CA: CPT

## 2022-11-27 PROCEDURE — 99285 EMERGENCY DEPT VISIT HI MDM: CPT

## 2022-11-27 PROCEDURE — 36415 COLL VENOUS BLD VENIPUNCTURE: CPT

## 2022-11-27 PROCEDURE — 85610 PROTHROMBIN TIME: CPT

## 2022-11-27 PROCEDURE — 94761 N-INVAS EAR/PLS OXIMETRY MLT: CPT

## 2022-11-27 PROCEDURE — 6370000000 HC RX 637 (ALT 250 FOR IP): Performed by: FAMILY MEDICINE

## 2022-11-27 PROCEDURE — 81001 URINALYSIS AUTO W/SCOPE: CPT

## 2022-11-27 PROCEDURE — 2580000003 HC RX 258: Performed by: FAMILY MEDICINE

## 2022-11-27 PROCEDURE — G0378 HOSPITAL OBSERVATION PER HR: HCPCS

## 2022-11-27 PROCEDURE — 94664 DEMO&/EVAL PT USE INHALER: CPT

## 2022-11-27 PROCEDURE — 1200000000 HC SEMI PRIVATE

## 2022-11-27 RX ORDER — SODIUM CHLORIDE 9 MG/ML
INJECTION, SOLUTION INTRAVENOUS PRN
Status: DISCONTINUED | OUTPATIENT
Start: 2022-11-27 | End: 2022-11-29 | Stop reason: HOSPADM

## 2022-11-27 RX ORDER — ACETAMINOPHEN 325 MG/1
650 TABLET ORAL EVERY 6 HOURS PRN
Status: DISCONTINUED | OUTPATIENT
Start: 2022-11-27 | End: 2022-11-29 | Stop reason: HOSPADM

## 2022-11-27 RX ORDER — WARFARIN SODIUM 7.5 MG/1
7.5 TABLET ORAL
Status: COMPLETED | OUTPATIENT
Start: 2022-11-27 | End: 2022-11-27

## 2022-11-27 RX ORDER — SODIUM CHLORIDE 0.9 % (FLUSH) 0.9 %
5-40 SYRINGE (ML) INJECTION PRN
Status: DISCONTINUED | OUTPATIENT
Start: 2022-11-27 | End: 2022-11-29 | Stop reason: HOSPADM

## 2022-11-27 RX ORDER — WARFARIN SODIUM 2.5 MG/1
2.5 TABLET ORAL DAILY
Status: DISCONTINUED | OUTPATIENT
Start: 2022-11-27 | End: 2022-11-27

## 2022-11-27 RX ORDER — LORAZEPAM 1 MG/1
1 TABLET ORAL 3 TIMES DAILY PRN
Status: DISCONTINUED | OUTPATIENT
Start: 2022-11-27 | End: 2022-11-29 | Stop reason: HOSPADM

## 2022-11-27 RX ORDER — ATORVASTATIN CALCIUM 20 MG/1
20 TABLET, FILM COATED ORAL NIGHTLY
Status: DISCONTINUED | OUTPATIENT
Start: 2022-11-27 | End: 2022-11-29 | Stop reason: HOSPADM

## 2022-11-27 RX ORDER — LANOLIN ALCOHOL/MO/W.PET/CERES
1000 CREAM (GRAM) TOPICAL DAILY
Status: DISCONTINUED | OUTPATIENT
Start: 2022-11-27 | End: 2022-11-29 | Stop reason: HOSPADM

## 2022-11-27 RX ORDER — SODIUM CHLORIDE 0.9 % (FLUSH) 0.9 %
5-40 SYRINGE (ML) INJECTION EVERY 12 HOURS SCHEDULED
Status: DISCONTINUED | OUTPATIENT
Start: 2022-11-27 | End: 2022-11-29 | Stop reason: HOSPADM

## 2022-11-27 RX ORDER — ONDANSETRON 4 MG/1
4 TABLET, ORALLY DISINTEGRATING ORAL EVERY 8 HOURS PRN
Status: DISCONTINUED | OUTPATIENT
Start: 2022-11-27 | End: 2022-11-29 | Stop reason: HOSPADM

## 2022-11-27 RX ORDER — ALBUTEROL SULFATE 2.5 MG/3ML
2.5 SOLUTION RESPIRATORY (INHALATION) EVERY 6 HOURS PRN
Status: DISCONTINUED | OUTPATIENT
Start: 2022-11-27 | End: 2022-11-29 | Stop reason: HOSPADM

## 2022-11-27 RX ORDER — POLYETHYLENE GLYCOL 3350 17 G/17G
17 POWDER, FOR SOLUTION ORAL DAILY PRN
Status: DISCONTINUED | OUTPATIENT
Start: 2022-11-27 | End: 2022-11-29 | Stop reason: HOSPADM

## 2022-11-27 RX ORDER — CHOLECALCIFEROL (VITAMIN D3) 125 MCG
5 CAPSULE ORAL NIGHTLY PRN
Status: DISCONTINUED | OUTPATIENT
Start: 2022-11-27 | End: 2022-11-29 | Stop reason: HOSPADM

## 2022-11-27 RX ORDER — ACETAMINOPHEN 650 MG/1
650 SUPPOSITORY RECTAL EVERY 6 HOURS PRN
Status: DISCONTINUED | OUTPATIENT
Start: 2022-11-27 | End: 2022-11-29 | Stop reason: HOSPADM

## 2022-11-27 RX ORDER — SODIUM CHLORIDE 9 MG/ML
INJECTION, SOLUTION INTRAVENOUS CONTINUOUS
Status: DISCONTINUED | OUTPATIENT
Start: 2022-11-27 | End: 2022-11-29

## 2022-11-27 RX ORDER — ONDANSETRON 2 MG/ML
4 INJECTION INTRAMUSCULAR; INTRAVENOUS EVERY 6 HOURS PRN
Status: DISCONTINUED | OUTPATIENT
Start: 2022-11-27 | End: 2022-11-29 | Stop reason: HOSPADM

## 2022-11-27 RX ORDER — CARVEDILOL 3.12 MG/1
3.12 TABLET ORAL 2 TIMES DAILY WITH MEALS
Status: DISCONTINUED | OUTPATIENT
Start: 2022-11-27 | End: 2022-11-29 | Stop reason: HOSPADM

## 2022-11-27 RX ORDER — POLYVINYL ALCOHOL 14 MG/ML
1 SOLUTION/ DROPS OPHTHALMIC PRN
Status: DISCONTINUED | OUTPATIENT
Start: 2022-11-27 | End: 2022-11-29 | Stop reason: HOSPADM

## 2022-11-27 RX ORDER — OXYCODONE HYDROCHLORIDE AND ACETAMINOPHEN 5; 325 MG/1; MG/1
1 TABLET ORAL EVERY 6 HOURS PRN
Status: DISCONTINUED | OUTPATIENT
Start: 2022-11-27 | End: 2022-11-29 | Stop reason: HOSPADM

## 2022-11-27 RX ORDER — NITROGLYCERIN 0.4 MG/1
0.4 TABLET SUBLINGUAL EVERY 5 MIN PRN
Status: DISCONTINUED | OUTPATIENT
Start: 2022-11-27 | End: 2022-11-29 | Stop reason: HOSPADM

## 2022-11-27 RX ORDER — PANTOPRAZOLE SODIUM 40 MG/1
40 TABLET, DELAYED RELEASE ORAL
Status: DISCONTINUED | OUTPATIENT
Start: 2022-11-27 | End: 2022-11-29 | Stop reason: HOSPADM

## 2022-11-27 RX ADMIN — CARVEDILOL 3.12 MG: 3.12 TABLET, FILM COATED ORAL at 12:29

## 2022-11-27 RX ADMIN — Medication 5 MG: at 21:35

## 2022-11-27 RX ADMIN — CYANOCOBALAMIN TAB 1000 MCG 1000 MCG: 1000 TAB at 12:29

## 2022-11-27 RX ADMIN — CARVEDILOL 3.12 MG: 3.12 TABLET, FILM COATED ORAL at 17:12

## 2022-11-27 RX ADMIN — WARFARIN SODIUM 7.5 MG: 7.5 TABLET ORAL at 17:12

## 2022-11-27 RX ADMIN — IOPAMIDOL 75 ML: 755 INJECTION, SOLUTION INTRAVENOUS at 02:48

## 2022-11-27 RX ADMIN — POLYVINYL ALCOHOL 1 DROP: 14 SOLUTION/ DROPS OPHTHALMIC at 17:17

## 2022-11-27 RX ADMIN — SODIUM CHLORIDE: 9 INJECTION, SOLUTION INTRAVENOUS at 12:33

## 2022-11-27 RX ADMIN — OXYCODONE HYDROCHLORIDE AND ACETAMINOPHEN 1 TABLET: 5; 325 TABLET ORAL at 13:41

## 2022-11-27 RX ADMIN — CALCIUM CARBONATE-VITAMIN D TAB 500 MG-200 UNIT 1 TABLET: 500-200 TAB at 20:32

## 2022-11-27 RX ADMIN — CALCIUM CARBONATE-VITAMIN D TAB 500 MG-200 UNIT 1 TABLET: 500-200 TAB at 12:29

## 2022-11-27 RX ADMIN — PANTOPRAZOLE SODIUM 40 MG: 40 TABLET, DELAYED RELEASE ORAL at 12:29

## 2022-11-27 RX ADMIN — ATORVASTATIN CALCIUM 20 MG: 20 TABLET, FILM COATED ORAL at 20:32

## 2022-11-27 ASSESSMENT — ENCOUNTER SYMPTOMS
SORE THROAT: 0
RHINORRHEA: 0
NAUSEA: 1
SHORTNESS OF BREATH: 1
COUGH: 0
BACK PAIN: 0

## 2022-11-27 ASSESSMENT — PAIN SCALES - GENERAL
PAINLEVEL_OUTOF10: 5
PAINLEVEL_OUTOF10: 0
PAINLEVEL_OUTOF10: 0

## 2022-11-27 ASSESSMENT — PAIN DESCRIPTION - DESCRIPTORS: DESCRIPTORS: PRESSURE

## 2022-11-27 ASSESSMENT — PAIN DESCRIPTION - LOCATION: LOCATION: CHEST;BACK

## 2022-11-27 ASSESSMENT — HEART SCORE: ECG: 0

## 2022-11-27 ASSESSMENT — PAIN DESCRIPTION - PAIN TYPE: TYPE: ACUTE PAIN;CHRONIC PAIN

## 2022-11-27 NOTE — H&P
[address] [facility]  History and Physical        Patient:  Darnell Araiza  MRN: [id number]    Chief Complaint:    Chief Complaint   Patient presents with    Chest Pain     Mscp, pressure like in nature, onset 1 hour PTA with SOB, nausea and diaphoresis    Dizziness     States feels like might pass out, onset PTA       History Obtained From:  patient, electronic medical record  PCP: Nicole Cedeno    History of Present Illness: The patient is a 81 y.o. female with h/o htn,chronic back pain,asthma,neuropathy, h/o dvt-on coumadin who presents with episode of syncope at home associated with chest pain and shortness of breath. She came to er and initial ekg and troponins are ok. She is admitted for syncope and chest pain w/u.     Past Medical History:        Diagnosis Date    Anxiety     Arthritis     Asthma     Blood circulation, collateral     CAD (coronary artery disease)     Chronic back pain     Depression     YEARS AGO NO RECENT PROBLEMS    Difficult intravenous access     AT TIMES    Disease of blood and blood forming organ     Diverticulitis     DVT (deep venous thrombosis) ([facility])     Right upper arm    Dyslexia     GERD (gastroesophageal reflux disease) 2014    ON RX    Headache(784.0)     Hx of blood clots 2001    LT UPPER LEG TREATED WITH INJECTIONS THEN ORAL BLOOD THINNER FOR 1 YR    Hyperlipidemia 2014    ON RX    Hypertension 2014    ON RX    Migraine     Mitral valve prolapse 1970    NO SYMPTOMS    Movement disorder     Neuromuscular disorder ([address]     Sleep apnea 2006    ON MACHINE HAD [address]    Wears dentures     UPPER    Wears glasses        Past Surgical History:        Procedure Laterality Date    BACK SURGERY      LUMBAR UNSURE WHEN    BACK SURGERY  10/17/2016    hardware removal, revision posterior instrumentation    CATARACT REMOVAL Bilateral 03/2021    both eyes    CERVICAL FUSION  2013    Anterior    COLONOSCOPY  7/16/2012    normal    HYSTERECTOMY (CERVIX STATUS UNKNOWN)  1986    TOTAL    SIGMOIDOSCOPY  6/20/2012    normal    SINUS SURGERY      SKIN CANCER EXCISION  06/2020    SPINE SURGERY  2005    LUMBAR    TONSILLECTOMY  2002    UPPP, TURBINOPLASY    UPPER GASTROINTESTINAL ENDOSCOPY  8/14/2013    Bravo done, see note, hiatal hernia, Grade 1 erosive esophagitis    VARICOSE VEIN SURGERY Bilateral        Medications Prior to Admission:    Prior to Admission medications    Medication Sig Start Date End Date Taking? Authorizing Provider   oxyCODONE-acetaminophen (PERCOCET) 5-325 MG per tablet TAKE 1 TABLET BY MOUTH EVERY 6 HOURS AS NEEDED *TO LAST 30 DAYS* 9/10/21   Historical Provider, MD   predniSONE (DELTASONE) 10 MG tablet TAKE 1 TABLET BY MOUTH TWICE A DAY  Patient not taking: Reported on 11/27/2022 8/18/21   Historical Provider, MD   enoxaparin (LOVENOX) 150 MG/ML injection INJECT 1 SYRINGE SUBCUTANEOUSLY EVERY DAY AS DIRECTED  Patient not taking: Reported on 11/27/2022 9/9/21   Historical Provider, MD   cyclobenzaprine (FLEXERIL) 10 MG tablet TAKE 1 TABLET BY MOUTH EVERY 8 HOURS AS NEEDED  Patient not taking: Reported on 11/27/2022 8/18/21   Historical Provider, MD   alendronate (FOSAMAX) 70 MG tablet TAKE 1 TABLET BY MOUTH WITH PLAIN WATER ONCE A WEEK 30 MINUTES BEFORE FIRST FOOD,DRINK,OR RX OF DAY  Patient not taking: Reported on 11/27/2022 4/5/21   Historical Provider, MD   warfarin (COUMADIN) 2.5 MG tablet TAKE 2 TABLETS BY MOUTH ON SUNDAY AND THURSDAY, 1 AND 1/2 TABLETS ALL OTHER DAYS OR AS DIRECTED 12/1/19   Historical Provider, MD   carvedilol (COREG) 3.125 MG tablet Take 1 tablet by mouth 2 times daily (with meals) 1/17/20   Nusrat Prescott MD   nitroGLYCERIN (NITROSTAT) 0.4 MG SL tablet Place 0.4 mg under the tongue every 5 minutes as needed for Chest pain up to max of 3 total doses. If no relief after 1 dose, call 911.     Historical Provider, MD   Cyanocobalamin (VITAMIN B-12) 1000 MCG SUBL Take 1 tablet by mouth daily 7/15/19   Historical Provider, MD calcium-vitamin D (OSCAL-500) 500-200 MG-UNIT per tablet Take 1 tablet by mouth 2 times daily    Historical Provider, MD   LORazepam (ATIVAN) 1 MG tablet Take 1 mg by mouth 3 times daily as needed for Anxiety. Historical Provider, MD   omeprazole (PRILOSEC) 40 MG delayed release capsule Take 40 mg by mouth daily    Historical Provider, MD   tiZANidine (ZANAFLEX) 4 MG tablet Take 1 tablet by mouth every 8 hours as needed (muscle spasms)  Patient not taking: Reported on 11/27/2022 5/24/19   CHRSITIAN Haq - CNP   Blood Pressure Monitoring (BLOOD PRESSURE CUFF) MISC 1 each by Does not apply route 2 times daily 4/25/17   Jessica Thomson MD   atorvastatin (LIPITOR) 20 MG tablet Take 1 tablet by mouth nightly 3/13/17   Jessica Thomson MD   albuterol (PROVENTIL) (2.5 MG/3ML) 0.083% nebulizer solution INHALE THE CONTENTS OF 1 VIAL VIA NEBULIZER EVERY 6 HOURS AS NEEDED  FOR  WHEEZING (SUBSTITUTED FOR PROVENTIL) 11/2/16   Brain Aguilera MD       Allergies:  Latex, Xarelto [rivaroxaban], Morphine, Seasonal, and Adhesive tape    Social History:   TOBACCO:   reports that she has never smoked. She has never used smokeless tobacco.  ETOH:   reports no history of alcohol use. Family History:       Problem Relation Age of Onset    Heart Disease Mother     Cancer Father         prostate    Cancer Brother         leukemia    Cancer Maternal Grandmother         colon    Cancer Paternal Grandmother         colon    Cancer Brother         PROSTATE    Cancer Brother         LUNG    Asthma Brother        Review of Systems:  Constitutional:negative  for fevers, and negative for chills.   Respiratory: positive for shortness of breath, negative for cough, and negative for wheezing  Cardiovascular: positive for chest pain, negative for palpitations, and positive for syncope  Gastrointestinal: negative for abdominal pain, negative for nausea,negative for vomiting, negative for diarrhea, negative for constipation, and negative for hematochezia or melena  Genitourinary: negative for dysuria, negative for urinary urgency, negative for urinary frequency, and negative for hematuria  Neurological: positive for chronic bilat leg weakness, numbness and tingling. All other systems were reviewed with the patient and are negative except as stated    Objective:    Vitals:   Temp: 97 °F (36.1 °C)  BP: 137/60  Resp: 16  Heart Rate: 67  SpO2: 97 %  -----------------------------------------------------------------  Exam:  GEN:    Awake, alert and oriented x3. EYES:  EOMI, pupils equal   NECK: Supple. No lymphadenopathy. No carotid bruit  CVS:    regular rate and rhythm, 2/6 systolic murmur  PULM:  CTA, no wheezes, rales or rhonchi, no acute respiratory distress  ABD:    Bowels sounds normal.  Abdomen is soft. No distention. no tenderness to palpation. EXT:   no edema bilaterally . No calf tenderness. NEURO: Moves all extremities. Motor and sensory are grossly intact  SKIN:  No rashes.   No skin lesions.    -----------------------------------------------------------------  Diagnostic Data:   All diagnostic data was reviewed  Lab Results   Component Value Date    WBC 6.9 11/27/2022    HGB 13.2 11/27/2022    MCV 90.8 11/27/2022     11/27/2022      Lab Results   Component Value Date    GLUCOSE 107 (H) 11/27/2022    BUN 12 11/27/2022    CREATININE 0.69 11/27/2022     11/27/2022    K 3.9 11/27/2022    CALCIUM 9.4 11/27/2022     11/27/2022    CO2 26 11/27/2022     Lab Results   Component Value Date    WBCUA 0 TO 2 11/27/2022    RBCUA 0 TO 2 11/27/2022    EPITHUA 0 TO 2 11/27/2022    LEUKOCYTESUR NEGATIVE 11/27/2022    SPECGRAV <1.005 (L) 11/27/2022    GLUCOSEU NEGATIVE 11/27/2022    KETUA NEGATIVE 11/27/2022    PROTEINU NEGATIVE 11/27/2022    HGBUR NEGATIVE 11/27/2022    CASTUA NOT REPORTED 01/14/2020    CRYSTUA NOT REPORTED 01/14/2020    BACTERIA TRACE (A) 11/27/2022    YEAST NOT REPORTED 01/14/2020       Assessment:     Principal Problem:    Syncope and collapse  Active Problems:    Hyperlipidemia    Failed back syndrome    Essential hypertension    Chest pain in adult  Resolved Problems:    * No resolved hospital problems. *      Plan:     Problem List       Chest pain in adult        This patient requires inpatient admission because of syncope and collapse requiring w/u  Factors affecting the medical complexity of this patient include chest pain,htn,chronic back pain. neuropathy, h/o dvt  Estimated length of stay is 2 days  Discussed patient's symptoms and data results including labs and imaging studies with the ER MD at time of admission  High risk drug monitoring: none      CORE MEASURES  DVT prophylaxis: already on chronic anticoagulation  Decubitus ulcer present on admission: No  CODE STATUS: FULL CODE  Nutrition Status: good   Physical therapy: Yes   Old Charts reviewed: Yes  EKG Reviewed:  Yes  Advance Directive Addressed: Yes    Urszula Maya MD , M.D.  11/27/2022  11:47 AM

## 2022-11-27 NOTE — RT PROTOCOL NOTE
RESPIRATORY ASSESSMENT PROTOCOL                                                                                              Patient Name: Meggan Davidson Room#: 6179/2960-02 : 1950     Admitting diagnosis: Near syncope [R55]  Chest pain, unspecified type [R07.9]  Syncope and collapse [R55]       Medical History:   Past Medical History:   Diagnosis Date    Anxiety     Arthritis     Asthma     Blood circulation, collateral     CAD (coronary artery disease)     Chronic back pain     Depression     YEARS AGO NO RECENT PROBLEMS    Difficult intravenous access     AT TIMES    Disease of blood and blood forming organ     Diverticulitis     DVT (deep venous thrombosis) ([facility])     Right upper arm    Dyslexia     GERD (gastroesophageal reflux disease)     ON RX    Headache(784.0)     Hx of blood clots     LT UPPER LEG TREATED WITH INJECTIONS THEN ORAL BLOOD THINNER FOR 1 YR    Hyperlipidemia     ON RX    Hypertension     ON RX    Migraine     Mitral valve prolapse 1970    NO SYMPTOMS    Movement disorder     Neuromuscular disorder ([address]     Sleep apnea     ON MACHINE HAD SURGERY-NOW RESOLVED    Wears dentures     UPPER    Wears glasses        PATIENT ASSESSMENT    LABORATORY DATA  Hematology:   Lab Results   Component Value Date/Time    WBC 6.1 2022 01:16 PM    RBC 4.51 2022 01:16 PM    RBC 4.06 2011 10:24 AM    HGB 13.5 2022 01:16 PM    HCT 40.7 2022 01:16 PM     2022 01:16 PM     2011 10:24 AM     Chemistry:  No results found for: PHART, RSA0HAA, PO2ART, F0AMFJQO, VPJ6NGS, PBEA    VITALS  Heart Rate: 90   Resp: 16  BP: 136/75  SpO2: 94 % O2 Device: None (Room air)  Temp: 98.5 °F (36.9 °C)    SKIN COLOR  [x] Normal  [] Pale  [] Dusky  [] Cyanotic    RESPIRATORY PATTERN  [x] Normal  [] Dyspnea  [] Cheyne-Francis  [] Kussmaul  [] Biots    AMBULATORY  [x] Yes  [] No  [] With Assistance      Patient Acuity 0 1 2 3 4 Score   Level of Concious (LOC) [x]  Alert & Oriented or Pt normal LOC []  Confused;follows directions []  Confused & uncooper-ative []  Obtunded []  Comatose 0   Respiratory Rate  (RR) [x]  Reg. rate & pattern. 12 - 20 bpm  []  Increased RR. Greater than 20 bpm   []  SOB w/ exertion or RR greater than 24 bpm []  Access- ory muscle use at rest. Abn.  resp. []  SOB at rest.   0   Bilateral Breath Sounds (BBS) [x]  Clear []  Diminish-ed bases  []  Diminish-ed t/o, or rales   []  Sporadic, scattered wheezes or rhonchi []  Persistentwheezes and, or absent BBS 0   Cough [x]  Strong, effective, & non-prod. []  Effective & prod. Less than 25 ml (2 TBSP) over past 24 hrs []  Ineffective & non-prod to less than 25 ML over past 24 hrs []  Ineffective and, or greater than 25 ml sputum prod. past 24 hrs. []  Nonspon- taneous; Requires suctioning 0   Pulmonary History  (PULM HX) []  No smoking and no chronic pulmonaryhistory []  Former smoker. Quit over 12 mos. ago []  Current smoker or quit w/ in 12 mos [x]  Pulm. History and, or 20 pk/yr smoking hx []  Admitted w/ acute pulm. dx and, or has been admitted w/ pulm. dx 2 or more times over past 12 mos 3   Surgical History this Admit  (SURG HX) [x]  No surgery []  General surgery []  Lower abdominal []  Thoracic or upper abdominal   []  Thoracic w/ pulm. disease 0   Chest X-Ray (CXR)/CT Scan [x]  Clear or not applicable []  Not available []  Atelect- asis or pleural effusions []  Localized infiltrate or pulm. edema []  Con-solidated Infiltrates, bilateral, or in more than 1 lobe 0   Slow or Forced VC, FEV1 OR PEFR (PULM FXN)  [x]  80% or greater, or not indicated []  Pt. unable to perform []  FEV1 or PEFR or VC 51-79%.   []  FEV1 or PEFR or VC  30-49%   []  FEV1 or PEFR or VC less than 30%   0   TOTAL ACUITY: 3       CARE PLAN    If Acuity Level is 2, 3, or 4 in any of the following:    [] BILATERAL BREATH SOUNDS (BBS)     [x] PULMONARY HISTORY (PULM HX)  [] PULMONARY FUNCTION (PULM FX)    Goal: Improve respiratory functions in patients with airway disease and decrease WOB    [x] AEROSOL PROTOCOL    Total Acuity:   16-32  []  Secondary Assessment in 24 hrs Total Acuity:  9-15  []  Secondary Assessment in 24 hrs Total Acuity:  4-8  []  Secondary Assessment in 48 hrs Total Acuity:  0-3  [x]  Secondary Assessment in 72 hrs   HHN AEROSOL THERAPY with  [physician-ordered bronchodilator(s)] q 4 & Albuterol PRN q2 hrs. Breath-Actuated Neb if BBS Acuity = 4, and pt. can use MP. Notify physician if condition deteriorates. [facility]N AEROSOL THERAPY with  [physician-ordered bronchodilator(s)]  QID and Albuterol PRN q4 hrs. Breath-Actuated Neb if BBS Acuity = 4, and pt. can use MP. Notify physician if condition deteriorates. MDI THERAPY with  2 actuations of [physician-ordered bronchodilator(s)] via spacer TID Albuterol and PRNq4 hrs. If unable to utilize MDI: HHN [physician-ordered bronchodilator(s)] TID and Albuterol PRN q4 hrs. Notify physician if condition deteriorates. MDI THERAPY with  [physician-ordered bronchodilator(s)] via spacer TID PRN. If unable to utilize MDI: HHN [physician-ordered bronchodilator(s)] TID PRN. Notify physician if condition deteriorates. If Acuity Level is 2, 3, or 4 in any of the following:    [] COUGH     [] SURGICAL HISTORY (SURG HX)  [] CHEST XRAY (CXR)    Goal: Improvement in sputum mobilization in patients with ineffective airway clearance. Reverse atelectasis.     [] Bronchopulmonary Hygiene Protocol    Total Acuity:   16-32  []  Secondary Assessment in 24 hrs Total Acuity:  9-15  []  Secondary Assessment in 24 hrs Total Acuity:  4-8  []  Secondary Assessment in 48 hrs Total Acuity:  0-3  []  Secondary Assessment in 72 hrs   METANEB QID with [physician-ordered bronchodilator(s)] if CXR Acuity = 4; otherwise:  PD&P, PEP, or Vest QID & PRN  NT Sxn PRN for ineffective cough  METANEB QID with [physician-ordered bronchodilator(s)] if CXR Acuity = 4; otherwise:  PD&P, PEP, or Vest TID & PRN  NT Sxn PRN for ineffective cough  Instruct patient to self-perform IS q1hr WA   Directed Cough self-performed q1hr WA     If Acuity Level is 2 or above in the following:    [] PULMONARY HISTORY (PULM HX)    Goal: Assist patient in quitting smoking to slow or stop the progression of lung disease.     [] Smoking Cessation Protocol    SMOKING CESSATION EDUCATION provided according to policy [id number]_602: (dee dee with an X)  ____Yes    ____ No     ____ NA    Smoking Cessation Booklet given:  ____Yes  ____No ____Patient Star Mora

## 2022-11-27 NOTE — PLAN OF CARE
Patient to room 310, telemetry applied noted NSR without ectopy. Report given to Esther RN at bedside.

## 2022-11-27 NOTE — PROGRESS NOTES
Patient arrived to [facility] room 310. Patient alert and oriented x 4. Vital signs and head to toe assessment performed. Telemetry reading normal sinus rhythm on monitor. Denies any pain at this time and is comfortable in bed. Awaiting on orders from Dr Beba Ralph. Bed in lowest position with bed alarm on and bed wheels locked. Call light and patient belongings in reach. Instructed to use call light for assistance.

## 2022-11-27 NOTE — ED PROVIDER NOTES
[facility] ED  Emergency Department        Pt Name: Mónica Whitley  MRN: [id number]  [place] 1950  Date of evaluation: 11/27/22    CHIEF COMPLAINT       Chief Complaint   Patient presents with    Chest Pain     Mscp, pressure like in nature, onset 1 hour PTA with SOB, nausea and diaphoresis    Dizziness     States feels like might pass out, onset PTA       HISTORY OF PRESENT ILLNESS  (Location/Symptom, Timing/Onset, Context/Setting, Quality, Duration, ModifyingFactors, Severity.)      Mónica Whitley is a 81 y.o. female who presents with episode that she was walking from her bedroom to the bathroom when she started to feel dizziness, nausea and diaphoresis. She did have a charley horse at the same time which also seems was very transient and symptoms of her feeling unwell persisted she felt as if she was going to pass out. She sat down in the bathroom. And called for her  who helped get her to the living room. Patient continued to feel like she had to go to the bathroom like everything in her was about to release and that she was going to die. Patient came in for further evaluation. She did experience some chest pressure which she continues to feel at this time. No recent cough congestion runny nose or sore throat. PAST MEDICAL / SURGICAL / SOCIAL / FAMILY HISTORY      has a past medical history of Anxiety, Arthritis, Asthma, Blood circulation, collateral, CAD (coronary artery disease), Chronic back pain, Depression, Difficult intravenous access, Disease of blood and blood forming organ, Diverticulitis, DVT (deep venous thrombosis) ([address] Dyslexia, GERD (gastroesophageal reflux disease), Headache(784.0), Hx of blood clots, Hyperlipidemia, Hypertension, Migraine, Mitral valve prolapse, Movement disorder, Neuromuscular disorder ([address] Sleep apnea, Wears dentures, and Wears glasses. has a past surgical history that includes Sigmoidoscopy (6/20/2012);  Spine surgery (2005); Tonsillectomy (2002); Varicose vein surgery (Bilateral); cervical fusion (2013); sinus surgery; Upper gastrointestinal endoscopy (8/14/2013); Hysterectomy (1986); Colonoscopy (7/16/2012); back surgery; back surgery (10/17/2016); Skin cancer excision (06/2020); and Cataract removal (Bilateral, 03/2021). Social History     Socioeconomic History    Marital status:      Spouse name: Not on file    Number of children: Not on file    Years of education: Not on file    Highest education level: Not on file   Occupational History    Occupation: Retired   Tobacco Use    Smoking status: Never    Smokeless tobacco: Never   Vaping Use    Vaping Use: Never used   Substance and Sexual Activity    Alcohol use: No    Drug use: No    Sexual activity: Not on file   Other Topics Concern    Not on file   Social History Narrative    Not on file     Social Determinants of Health     Financial Resource Strain: Not on file   Food Insecurity: Not on file   Transportation Needs: Not on file   Physical Activity: Not on file   Stress: Not on file   Social Connections: Not on file   Intimate Partner Violence: Not on file   Housing Stability: Not on file       Family History   Problem Relation Age of Onset    Heart Disease Mother     Cancer Father         prostate    Cancer Brother         leukemia    Cancer Maternal Grandmother         colon    Cancer Paternal Grandmother         colon    Cancer Brother         PROSTATE    Cancer Brother         LUNG    Asthma Brother        Allergies:  Latex, Xarelto [rivaroxaban], Morphine, Seasonal, and Adhesive tape    Home Medications:  Prior to Admission medications    Medication Sig Start Date End Date Taking?  Authorizing Provider   oxyCODONE-acetaminophen (PERCOCET) 5-325 MG per tablet TAKE 1 TABLET BY MOUTH EVERY 6 HOURS AS NEEDED *TO LAST 30 DAYS* 9/10/21   Historical Provider, MD   predniSONE (DELTASONE) 10 MG tablet TAKE 1 TABLET BY MOUTH TWICE A DAY 8/18/21   Historical Provider, MD enoxaparin (LOVENOX) 150 MG/ML injection INJECT 1 SYRINGE SUBCUTANEOUSLY EVERY DAY AS DIRECTED 9/9/21   Historical Provider, MD   cyclobenzaprine (FLEXERIL) 10 MG tablet TAKE 1 TABLET BY MOUTH EVERY 8 HOURS AS NEEDED 8/18/21   Historical Provider, MD   alendronate (FOSAMAX) 70 MG tablet TAKE 1 TABLET BY MOUTH WITH PLAIN WATER ONCE A WEEK 30 MINUTES BEFORE FIRST FOOD,DRINK,OR RX OF DAY 4/5/21   Historical Provider, MD   warfarin (COUMADIN) 2.5 MG tablet TAKE 2 TABLETS BY MOUTH ON SUNDAY AND THURSDAY, 1 AND 1/2 TABLETS ALL OTHER DAYS OR AS DIRECTED 12/1/19   Historical Provider, MD   carvedilol (COREG) 3.125 MG tablet Take 1 tablet by mouth 2 times daily (with meals) 1/17/20   Gerome Goodell, MD   nitroGLYCERIN (NITROSTAT) 0.4 MG SL tablet Place 0.4 mg under the tongue every 5 minutes as needed for Chest pain up to max of 3 total doses. If no relief after 1 dose, call 911. Historical Provider, MD   Cyanocobalamin (VITAMIN B-12) 1000 MCG SUBL Take 1 tablet by mouth daily 7/15/19   Historical Provider, MD   calcium-vitamin D (OSCAL-500) 500-200 MG-UNIT per tablet Take 1 tablet by mouth 2 times daily    Historical Provider, MD   LORazepam (ATIVAN) 1 MG tablet Take 1 mg by mouth 3 times daily as needed for Anxiety.     Historical Provider, MD   omeprazole (PRILOSEC) 40 MG delayed release capsule Take 40 mg by mouth daily    Historical Provider, MD   tiZANidine (ZANAFLEX) 4 MG tablet Take 1 tablet by mouth every 8 hours as needed (muscle spasms) 5/24/19   Ynes Sarabia APRN - CNP   Blood Pressure Monitoring (BLOOD PRESSURE CUFF) MISC 1 each by Does not apply route 2 times daily 4/25/17   Jenifer Jung MD   atorvastatin (LIPITOR) 20 MG tablet Take 1 tablet by mouth nightly 3/13/17   Jenifer Jung MD   albuterol (PROVENTIL) (2.5 MG/3ML) 0.083% nebulizer solution INHALE THE CONTENTS OF 1 VIAL VIA NEBULIZER EVERY 6 HOURS AS NEEDED  FOR  WHEEZING (SUBSTITUTED FOR PROVENTIL) 11/2/16   Belen Connell MD       REVIEW OF SYSTEMS    (2-9 systems for level 4, 10 or more for level 5)      Review of Systems   Constitutional:  Positive for diaphoresis. HENT:  Negative for congestion, postnasal drip, rhinorrhea and sore throat. Respiratory:  Positive for shortness of breath. Negative for cough. Cardiovascular:  Positive for chest pain. Gastrointestinal:  Positive for nausea. Genitourinary:  Negative for decreased urine volume and difficulty urinating. Musculoskeletal:  Negative for arthralgias and back pain. Neurological:  Positive for dizziness, weakness (generalized) and light-headedness. PHYSICAL EXAM   (up to 7 for level 4, 8 or more for level 5)     INITIAL VITALS:   BP (!) 122/57   Pulse 75   Temp 98 °F (36.7 °C) (Oral)   Resp 15   Wt 198 lb (89.8 kg)   LMP 10/05/1986 (Approximate)   SpO2 95%   BMI 33.99 kg/m²     Physical Exam  Vitals and nursing note reviewed. Constitutional:       Comments: Nontoxic appearing, answering all questions appropriately no signs of distress   HENT:      Head: Normocephalic and atraumatic. Eyes:      General:         Right eye: No discharge. Left eye: No discharge. Conjunctiva/sclera: Conjunctivae normal.   Cardiovascular:      Rate and Rhythm: Normal rate and regular rhythm. Heart sounds: Normal heart sounds. No murmur heard. No friction rub. No gallop. Pulmonary:      Effort: Pulmonary effort is normal. No respiratory distress. Breath sounds: Normal breath sounds. No stridor. No wheezing, rhonchi or rales. Chest:      Chest wall: No tenderness. Abdominal:      General: There is no distension. Palpations: Abdomen is soft. There is no mass. Tenderness: There is no abdominal tenderness. There is no guarding or rebound. Skin:     General: Skin is warm and dry. Findings: No rash. Neurological:      Mental Status: She is alert and oriented to person, place, and time.        DIFFERENTIAL  DIAGNOSIS     Concerning story for ACS, check labs, EKG, pt/inr  Symptoms resolved at this time. Heart Score:   Heart Score for chest pain patients  History:  Moderately Suspicious  ECG: Normal  Patient Age: > 65 years  *Risk factors for Atherosclerotic disease: Hypercholesterolemia, Hypertension, Obesity  Risk Factors: > 3 Risk factors or history of atherosclerotic disease*  Troponin: < 1X normal limit  Heart Score Total: 5    Score 0-3: 2.5% MACE over next 6 weeks = Discharge Home  Score 4-6: 20.3% MACE over next 6 weeks = Admit for Clinical Observation  Score 7-10: 72.7% MACE over next 6 weeks = Early Invasive Strategies         PLAN (LABS / IMAGING / EKG):  Orders Placed This Encounter   Procedures    XR CHEST (2 VW)    CT CHEST PULMONARY EMBOLISM W CONTRAST    Basic Metabolic Panel    CBC with Auto Differential    Troponin    Brain Natriuretic Peptide    Protime-INR    Troponin    Urinalysis with Reflex to Culture    Microscopic Urinalysis    Cardiac Monitor - ED Only    Pulse oximetry, continuous    EKG 12 Lead    Saline lock IV       MEDICATIONS ORDERED:  Orders Placed This Encounter   Medications    iopamidol (ISOVUE-370) 76 % injection 75 mL       DIAGNOSTIC RESULTS / EMERGENCY DEPARTMENT COURSE / MDM     LABS:  Results for orders placed or performed during the hospital encounter of 24/58/14   Basic Metabolic Panel   Result Value Ref Range    Glucose 107 (H) 70 - 99 mg/dL    BUN 12 8 - 23 mg/dL    Creatinine 0.69 0.50 - 0.90 mg/dL    Est, Glom Filt Rate >60 >60 mL/min/1.73m2    Bun/Cre Ratio 17 9 - 20    Calcium 9.4 8.6 - 10.4 mg/dL    Sodium 137 135 - 144 mmol/L    Potassium 3.9 3.7 - 5.3 mmol/L    Chloride 100 98 - 107 mmol/L    CO2 26 20 - 31 mmol/L    Anion Gap 11 9 - 17 mmol/L   CBC with Auto Differential   Result Value Ref Range    WBC 6.9 3.5 - 11.3 k/uL    RBC 4.37 3.95 - 5.11 m/uL    Hemoglobin 13.2 11.9 - 15.1 g/dL    Hematocrit 39.7 36.3 - 47.1 %    MCV 90.8 82.6 - 102.9 fL    MCH 30.2 25.2 - 33.5 pg    MCHC 33.2 28.4 - 34.8 g/dL RDW 13.8 11.8 - 14.4 %    Platelets 188 161 - 021 k/uL    MPV 9.4 8.1 - 13.5 fL    NRBC Automated 0.0 0.0 per 100 WBC    Seg Neutrophils 52 36 - 65 %    Lymphocytes 35 24 - 43 %    Monocytes 9 3 - 12 %    Eosinophils % 3 1 - 4 %    Basophils 1 0 - 2 %    Immature Granulocytes 0 0 %    Segs Absolute 3.60 1.50 - 8.10 k/uL    Absolute Lymph # 2.37 1.10 - 3.70 k/uL    Absolute Mono # 0.61 0.10 - 1.20 k/uL    Absolute Eos # 0.21 0.00 - 0.44 k/uL    Basophils Absolute 0.04 0.00 - 0.20 k/uL    Absolute Immature Granulocyte <0.03 0.00 - 0.30 k/uL   Troponin   Result Value Ref Range    Troponin, High Sensitivity <6 0 - 14 ng/L   Brain Natriuretic Peptide   Result Value Ref Range    Pro-BNP 23 <300 pg/mL   Protime-INR   Result Value Ref Range    Protime 19.3 (H) 11.5 - 14.2 sec    INR 1.6    Troponin   Result Value Ref Range    Troponin, High Sensitivity <6 0 - 14 ng/L   Urinalysis with Reflex to Culture    Specimen: Urine   Result Value Ref Range    Color, UA Yellow Yellow    Turbidity UA Clear Clear    Glucose, Ur NEGATIVE NEGATIVE    Bilirubin Urine NEGATIVE NEGATIVE    Ketones, Urine NEGATIVE NEGATIVE    Specific Gravity, UA <1.005 (L) 1.010 - 1.020    Urine Hgb NEGATIVE NEGATIVE    pH, UA 7.0 5.0 - 9.0    Protein, UA NEGATIVE NEGATIVE    Urobilinogen, Urine Normal Normal    Nitrite, Urine NEGATIVE NEGATIVE    Leukocyte Esterase, Urine NEGATIVE NEGATIVE   Microscopic Urinalysis   Result Value Ref Range    WBC, UA 0 TO 2 0 - 5 /HPF    RBC, UA 0 TO 2 0 - 2 /HPF    Epithelial Cells UA 0 TO 2 0 - 25 /HPF    Bacteria, UA TRACE (A) None   EKG 12 Lead   Result Value Ref Range    Ventricular Rate 68 BPM    Atrial Rate 68 BPM    P-R Interval 178 ms    QRS Duration 76 ms    Q-T Interval 424 ms    QTc Calculation (Bazett) 450 ms    P Axis 41 degrees    R Axis -10 degrees    T Axis 47 degrees       IMPRESSION: chest pain, pre syncope    RADIOLOGY:  CT CHEST PULMONARY EMBOLISM W CONTRAST   Final Result   No evidence of pulmonary embolism or acute pulmonary abnormality. XR CHEST (2 VW)   Final Result   No acute cardiopulmonary process. EKG:  EKG shows normal sinus rhythm, left axis deviation Q waves noted in the inferior leads and some nonspecific T wave flattening noted, no ST elevations or depressions noted. EMERGENCY DEPARTMENT COURSE:  Patient given hr symptoms which are oncerning, and plan to admit for further cardiac workup. FINAL IMPRESSION      1. Near syncope    2. Chest pain, unspecified type          DISPOSITION / PLAN     DISPOSITION Decision To Admit 11/27/2022 05:38:02 AM      PATIENT REFERRED TO:  No follow-up provider specified.     DISCHARGE MEDICATIONS:  New Prescriptions    No medications on file       Tere Marie DO  6:17 AM    Attending Emergency Physician  [address] [place] [address] ED    (Please note that portions of this note were completed with a voice recognition program.  Effortswere made to edit the dictations but occasionally words are mis-transcribed.)              Ye Handy, DO  11/27/22 [id number]

## 2022-11-27 NOTE — PROGRESS NOTES
Instructed patient on incentive spirometry. [facility]  - [facility]

## 2022-11-27 NOTE — CONSULTS
Grant Vasquez is a 81 y.o. female for whom pharmacy has been asked to manage warfarin therapy. Consulting Physician: Torsten Moore  Reason for Admission: Chest pain, syncope, SOB, dizziness    Warfarin dose prior to admission: 5 mg Ashley/Th, 3.75 mg AOD  Warfarin indication: Hx DVT  Target INR range: 2.0-3.0     Past Medical History:   Diagnosis Date    Anxiety     Arthritis     Asthma     Blood circulation, collateral     CAD (coronary artery disease)     Chronic back pain     Depression     YEARS AGO NO RECENT PROBLEMS    Difficult intravenous access     AT TIMES    Disease of blood and blood forming organ     Diverticulitis     DVT (deep venous thrombosis) ([facility])     Right upper arm    Dyslexia     GERD (gastroesophageal reflux disease) 2014    ON RX    Headache(784.0)     Hx of blood clots 2001    LT UPPER LEG TREATED WITH INJECTIONS THEN ORAL BLOOD THINNER FOR 1 YR    Hyperlipidemia 2014    ON RX    Hypertension 2014    ON RX    Migraine     Mitral valve prolapse 1970    NO SYMPTOMS    Movement disorder     Neuromuscular disorder ([address]     Sleep apnea 2006    ON MACHINE HAD SURGERY-NOW RESOLVED    Wears dentures     UPPER    Wears glasses        INR (no units)   Date Value   11/27/2022 1.6   11/11/2021 1.0   09/13/2021 1.0   07/01/2020 2.1   02/17/2020 1.1   01/17/2020 1.5   01/16/2020 1.6       Hemoglobin (g/dL)   Date Value   11/27/2022 13.2   07/01/2020 13.5   01/17/2020 12.9     Hematocrit (%)   Date Value   11/27/2022 39.7   07/01/2020 42.2   01/17/2020 40.4     Platelet Count (k/uL)   Date Value   12/09/2011 262     Platelets (k/uL)   Date Value   11/27/2022 231   07/01/2020 220   01/17/2020 201       Current warfarin drug-drug interactions: PRN APAP, PRN Percocet      Date             INR        Dose   11/27/2022      1.6    7.5 mg    Daily PT/INR until stable within therapeutic range.      Thank you for the consult.      David QuinteroD 11/27/2022 12:41 PM

## 2022-11-28 ENCOUNTER — APPOINTMENT (OUTPATIENT)
Dept: NON INVASIVE DIAGNOSTICS | Age: 72
DRG: 392 | End: 2022-11-28
Payer: MEDICARE

## 2022-11-28 ENCOUNTER — APPOINTMENT (OUTPATIENT)
Dept: VASCULAR LAB | Age: 72
DRG: 392 | End: 2022-11-28
Payer: MEDICARE

## 2022-11-28 PROBLEM — R19.7 ACUTE DIARRHEA: Status: ACTIVE | Noted: 2022-11-28

## 2022-11-28 LAB
EKG ATRIAL RATE: 66 BPM
EKG ATRIAL RATE: 68 BPM
EKG P AXIS: 41 DEGREES
EKG P AXIS: 46 DEGREES
EKG P-R INTERVAL: 170 MS
EKG P-R INTERVAL: 178 MS
EKG Q-T INTERVAL: 420 MS
EKG Q-T INTERVAL: 424 MS
EKG QRS DURATION: 74 MS
EKG QRS DURATION: 76 MS
EKG QTC CALCULATION (BAZETT): 440 MS
EKG QTC CALCULATION (BAZETT): 450 MS
EKG R AXIS: -10 DEGREES
EKG R AXIS: -5 DEGREES
EKG T AXIS: 35 DEGREES
EKG T AXIS: 47 DEGREES
EKG VENTRICULAR RATE: 66 BPM
EKG VENTRICULAR RATE: 68 BPM
INR BLD: 1.9
PROTHROMBIN TIME: 21.3 SEC (ref 11.5–14.2)
TROPONIN, HIGH SENSITIVITY: 6 NG/L (ref 0–14)

## 2022-11-28 PROCEDURE — 6370000000 HC RX 637 (ALT 250 FOR IP): Performed by: NURSE PRACTITIONER

## 2022-11-28 PROCEDURE — 6370000000 HC RX 637 (ALT 250 FOR IP): Performed by: FAMILY MEDICINE

## 2022-11-28 PROCEDURE — 85610 PROTHROMBIN TIME: CPT

## 2022-11-28 PROCEDURE — 3430000000 HC RX DIAGNOSTIC RADIOPHARMACEUTICAL: Performed by: INTERNAL MEDICINE

## 2022-11-28 PROCEDURE — 84484 ASSAY OF TROPONIN QUANT: CPT

## 2022-11-28 PROCEDURE — G0378 HOSPITAL OBSERVATION PER HR: HCPCS

## 2022-11-28 PROCEDURE — 2580000003 HC RX 258: Performed by: FAMILY MEDICINE

## 2022-11-28 PROCEDURE — 80061 LIPID PANEL: CPT

## 2022-11-28 PROCEDURE — 78452 HT MUSCLE IMAGE SPECT MULT: CPT

## 2022-11-28 PROCEDURE — 1200000000 HC SEMI PRIVATE

## 2022-11-28 PROCEDURE — 94761 N-INVAS EAR/PLS OXIMETRY MLT: CPT

## 2022-11-28 PROCEDURE — 93010 ELECTROCARDIOGRAM REPORT: CPT | Performed by: FAMILY MEDICINE

## 2022-11-28 PROCEDURE — 93880 EXTRACRANIAL BILAT STUDY: CPT

## 2022-11-28 PROCEDURE — 93017 CV STRESS TEST TRACING ONLY: CPT

## 2022-11-28 PROCEDURE — A9500 TC99M SESTAMIBI: HCPCS | Performed by: INTERNAL MEDICINE

## 2022-11-28 PROCEDURE — 99222 1ST HOSP IP/OBS MODERATE 55: CPT | Performed by: INTERNAL MEDICINE

## 2022-11-28 PROCEDURE — 83036 HEMOGLOBIN GLYCOSYLATED A1C: CPT

## 2022-11-28 PROCEDURE — 6360000002 HC RX W HCPCS: Performed by: FAMILY MEDICINE

## 2022-11-28 PROCEDURE — 93306 TTE W/DOPPLER COMPLETE: CPT

## 2022-11-28 PROCEDURE — 36415 COLL VENOUS BLD VENIPUNCTURE: CPT

## 2022-11-28 PROCEDURE — 93010 ELECTROCARDIOGRAM REPORT: CPT | Performed by: INTERNAL MEDICINE

## 2022-11-28 PROCEDURE — 93005 ELECTROCARDIOGRAM TRACING: CPT | Performed by: FAMILY MEDICINE

## 2022-11-28 RX ORDER — WARFARIN SODIUM 5 MG/1
5 TABLET ORAL
Status: COMPLETED | OUTPATIENT
Start: 2022-11-28 | End: 2022-11-28

## 2022-11-28 RX ORDER — LOPERAMIDE HYDROCHLORIDE 2 MG/1
2 CAPSULE ORAL 4 TIMES DAILY PRN
Status: DISCONTINUED | OUTPATIENT
Start: 2022-11-28 | End: 2022-11-29 | Stop reason: HOSPADM

## 2022-11-28 RX ORDER — LOPERAMIDE HYDROCHLORIDE 2 MG/1
2 CAPSULE ORAL 2 TIMES DAILY PRN
Status: DISCONTINUED | OUTPATIENT
Start: 2022-11-28 | End: 2022-11-28

## 2022-11-28 RX ADMIN — CALCIUM CARBONATE-VITAMIN D TAB 500 MG-200 UNIT 1 TABLET: 500-200 TAB at 20:59

## 2022-11-28 RX ADMIN — LOPERAMIDE HYDROCHLORIDE 2 MG: 2 CAPSULE ORAL at 13:41

## 2022-11-28 RX ADMIN — OXYCODONE HYDROCHLORIDE AND ACETAMINOPHEN 1 TABLET: 5; 325 TABLET ORAL at 13:40

## 2022-11-28 RX ADMIN — LORAZEPAM 1 MG: 1 TABLET ORAL at 20:59

## 2022-11-28 RX ADMIN — SODIUM CHLORIDE: 9 INJECTION, SOLUTION INTRAVENOUS at 02:45

## 2022-11-28 RX ADMIN — POLYVINYL ALCOHOL 1 DROP: 14 SOLUTION/ DROPS OPHTHALMIC at 20:54

## 2022-11-28 RX ADMIN — SODIUM CHLORIDE: 9 INJECTION, SOLUTION INTRAVENOUS at 21:03

## 2022-11-28 RX ADMIN — CALCIUM CARBONATE-VITAMIN D TAB 500 MG-200 UNIT 1 TABLET: 500-200 TAB at 09:29

## 2022-11-28 RX ADMIN — PANTOPRAZOLE SODIUM 40 MG: 40 TABLET, DELAYED RELEASE ORAL at 07:13

## 2022-11-28 RX ADMIN — ONDANSETRON 4 MG: 4 TABLET, ORALLY DISINTEGRATING ORAL at 05:04

## 2022-11-28 RX ADMIN — WARFARIN SODIUM 5 MG: 5 TABLET ORAL at 16:45

## 2022-11-28 RX ADMIN — CARVEDILOL 3.12 MG: 3.12 TABLET, FILM COATED ORAL at 16:45

## 2022-11-28 RX ADMIN — Medication 30 MILLICURIE: at 11:54

## 2022-11-28 RX ADMIN — LOPERAMIDE HYDROCHLORIDE 2 MG: 2 CAPSULE ORAL at 19:27

## 2022-11-28 RX ADMIN — LOPERAMIDE HYDROCHLORIDE 2 MG: 2 CAPSULE ORAL at 05:55

## 2022-11-28 RX ADMIN — REGADENOSON 0.4 MG: 0.08 INJECTION, SOLUTION INTRAVENOUS at 12:07

## 2022-11-28 RX ADMIN — OXYCODONE HYDROCHLORIDE AND ACETAMINOPHEN 1 TABLET: 5; 325 TABLET ORAL at 20:58

## 2022-11-28 RX ADMIN — CARVEDILOL 3.12 MG: 3.12 TABLET, FILM COATED ORAL at 09:29

## 2022-11-28 RX ADMIN — ATORVASTATIN CALCIUM 20 MG: 20 TABLET, FILM COATED ORAL at 20:59

## 2022-11-28 RX ADMIN — CYANOCOBALAMIN TAB 1000 MCG 1000 MCG: 1000 TAB at 09:29

## 2022-11-28 RX ADMIN — Medication 10 MILLICURIE: at 11:54

## 2022-11-28 ASSESSMENT — PAIN DESCRIPTION - LOCATION
LOCATION: BACK
LOCATION: NECK
LOCATION: BACK

## 2022-11-28 ASSESSMENT — PAIN DESCRIPTION - DESCRIPTORS
DESCRIPTORS: ACHING
DESCRIPTORS: ACHING
DESCRIPTORS: STABBING

## 2022-11-28 ASSESSMENT — PAIN DESCRIPTION - ORIENTATION
ORIENTATION: POSTERIOR
ORIENTATION: LOWER
ORIENTATION: UPPER;LOWER;MID

## 2022-11-28 ASSESSMENT — PAIN SCALES - GENERAL
PAINLEVEL_OUTOF10: 7
PAINLEVEL_OUTOF10: 0
PAINLEVEL_OUTOF10: 3
PAINLEVEL_OUTOF10: 7

## 2022-11-28 ASSESSMENT — PAIN DESCRIPTION - ONSET: ONSET: ON-GOING

## 2022-11-28 ASSESSMENT — PAIN DESCRIPTION - FREQUENCY: FREQUENCY: CONTINUOUS

## 2022-11-28 ASSESSMENT — PAIN DESCRIPTION - PAIN TYPE: TYPE: CHRONIC PAIN

## 2022-11-28 NOTE — PROGRESS NOTES
[address] [place] [address] Note-Warfarin Follow-up       Patient:  Archana Dunaway  MRN: [id number]    Warfarin consult follow-up:    INR (no units)   Date Value   11/28/2022 1.9   11/27/2022 1.6   11/11/2021 1.0   09/13/2021 1.0   07/01/2020 2.1   02/17/2020 1.1   01/17/2020 1.5       Hemoglobin (g/dL)   Date Value   11/27/2022 13.5   11/27/2022 13.2   07/01/2020 13.5     Hematocrit (%)   Date Value   11/27/2022 40.7   11/27/2022 39.7   07/01/2020 42.2     Platelet Count (k/uL)   Date Value   12/09/2011 262     Platelets (k/uL)   Date Value   11/27/2022 228   11/27/2022 231   07/01/2020 220       Target INR Range: 2-3    Significant Drug-Drug Interactions:  New warfarin drug-drug interactions: acetaminophen and percocet  Discontinued drug-drug interactions: none      Notes:                      Warfarin 5 mg today for INR 1.9. Daily PT/INR until stable within therapeutic range.      Thank you,  Joyce Mendez, DavidD, 11/28/2022 8:06 AM

## 2022-11-28 NOTE — H&P
Patient: Keisha Dumont  : 1950  Date of Admission: 2022  Primary Care Physician: Leobardo Soulier  Today's Date: 2022    REASON FOR CONSULTATION/CC:   Chief Complaint   Patient presents with    Chest Pain     Mscp, pressure like in nature, onset 1 hour PTA with SOB, nausea and diaphoresis    Dizziness     States feels like might pass out, onset PTA         HPI:  Ms. Otoniel Miller is a 81 y.o. female who presented to the emergency room because of near passing out episode. She has history of DVT/coagulopathy and on chronic anticoagulation for this. She has been admitted to [facility] back in 2017 and had a cardiac cath that showed normal coronary arteries. Her main complaint right now is diarrhea. She said the whole family has severe diarrhea. According to her  the bedside, on  she felt dizzy and had chest pressure and almost passed. The diarrhea problem started later on. She has history of hypertension on carvedilol. She has strong family history of premature coronary artery disease as outlined below. She is lifelong non-smoker. She has history of dyslipidemia. No history of diabetes. She is currently feeling really good. She denies any chest pain, pressure or tightness. No significant shortness of breath. No palpitations or dizziness. She continues to have watery diarrhea. She has some nausea but no vomiting. Blood work reviewed. Her troponin is serially normal.  BNP is normal.  Kidney function is unremarkable. CT of the chest showed no evidence of pulmonary. INR is subtherapeutic. Echo and stress test are fairly unremarkable.      Past Medical History:   Diagnosis Date    Anxiety     Arthritis     Asthma     Blood circulation, collateral     CAD (coronary artery disease)     Chronic back pain     Depression     YEARS AGO NO RECENT PROBLEMS    Difficult intravenous access     AT TIMES    Disease of blood and blood forming organ Diverticulitis     DVT (deep venous thrombosis) ([facility])     Right upper arm    Dyslexia     GERD (gastroesophageal reflux disease) 2014    ON RX    Headache(784.0)     Hx of blood clots 2001    LT UPPER LEG TREATED WITH INJECTIONS THEN ORAL BLOOD THINNER FOR 1 YR    Hyperlipidemia 2014    ON RX    Hypertension 2014    ON RX    Migraine     Mitral valve prolapse 1970    NO SYMPTOMS    Movement disorder     Neuromuscular disorder ([facility] [address]     Sleep apnea 2006    ON MACHINE HAD SURGERY-NOW RESOLVED    Wears dentures     UPPER    Wears glasses        CURRENT ALLERGIES: Latex, Xarelto [rivaroxaban], Morphine, Seasonal, and Adhesive tape REVIEW OF SYSTEMS: 14 systems were reviewed. Pertinent positives and negatives as above, all else negative. Past Surgical History:   Procedure Laterality Date    BACK SURGERY      LUMBAR UNSURE WHEN    BACK SURGERY  10/17/2016    hardware removal, revision posterior instrumentation    CATARACT REMOVAL Bilateral 03/2021    both eyes    CERVICAL FUSION  2013    Anterior    COLONOSCOPY  7/16/2012    normal    HYSTERECTOMY (CERVIX STATUS UNKNOWN)  1986    TOTAL    SIGMOIDOSCOPY  6/20/2012    normal    SINUS SURGERY      SKIN CANCER EXCISION  06/2020    SPINE SURGERY  2005    LUMBAR    TONSILLECTOMY  2002    UPPP, TURBINOPLASY    UPPER GASTROINTESTINAL ENDOSCOPY  8/14/2013    Bravo done, see note, hiatal hernia, Grade 1 erosive esophagitis    VARICOSE VEIN SURGERY Bilateral     Social History:  Social History     Tobacco Use    Smoking status: Never    Smokeless tobacco: Never   Vaping Use    Vaping Use: Never used   Substance Use Topics    Alcohol use: No    Drug use: No        CURRENT MEDICATIONS:  Prior to Admission medications    Medication Sig Start Date End Date Taking?  Authorizing Provider   oxyCODONE-acetaminophen (PERCOCET) 5-325 MG per tablet TAKE 1 TABLET BY MOUTH EVERY 6 HOURS AS NEEDED *TO LAST 30 DAYS* 9/10/21   Historical Provider, MD   predniSONE (DELTASONE) 10 MG tablet TAKE 1 TABLET BY MOUTH TWICE A DAY  Patient not taking: Reported on 11/27/2022 8/18/21   Historical Provider, MD   enoxaparin (LOVENOX) 150 MG/ML injection INJECT 1 SYRINGE SUBCUTANEOUSLY EVERY DAY AS DIRECTED  Patient not taking: Reported on 11/27/2022 9/9/21   Historical Provider, MD   cyclobenzaprine (FLEXERIL) 10 MG tablet TAKE 1 TABLET BY MOUTH EVERY 8 HOURS AS NEEDED  Patient not taking: Reported on 11/27/2022 8/18/21   Historical Provider, MD   alendronate (FOSAMAX) 70 MG tablet TAKE 1 TABLET BY MOUTH WITH PLAIN WATER ONCE A WEEK 30 MINUTES BEFORE FIRST FOOD,DRINK,OR RX OF DAY  Patient not taking: Reported on 11/27/2022 4/5/21   Historical Provider, MD   warfarin (COUMADIN) 2.5 MG tablet TAKE 2 TABLETS BY MOUTH ON SUNDAY AND THURSDAY, 1 AND 1/2 TABLETS ALL OTHER DAYS OR AS DIRECTED 12/1/19   Historical Provider, MD   carvedilol (COREG) 3.125 MG tablet Take 1 tablet by mouth 2 times daily (with meals) 1/17/20   Jerome Liao MD   nitroGLYCERIN (NITROSTAT) 0.4 MG SL tablet Place 0.4 mg under the tongue every 5 minutes as needed for Chest pain up to max of 3 total doses. If no relief after 1 dose, call 911. Historical Provider, MD   Cyanocobalamin (VITAMIN B-12) 1000 MCG SUBL Take 1 tablet by mouth daily 7/15/19   Historical Provider, MD   calcium-vitamin D (OSCAL-500) 500-200 MG-UNIT per tablet Take 1 tablet by mouth 2 times daily    Historical Provider, MD   LORazepam (ATIVAN) 1 MG tablet Take 1 mg by mouth 3 times daily as needed for Anxiety.     Historical Provider, MD   omeprazole (PRILOSEC) 40 MG delayed release capsule Take 40 mg by mouth daily    Historical Provider, MD   tiZANidine (ZANAFLEX) 4 MG tablet Take 1 tablet by mouth every 8 hours as needed (muscle spasms)  Patient not taking: Reported on 11/27/2022 5/24/19   CHRISTIAN Andrade - CNP   Blood Pressure Monitoring (BLOOD PRESSURE CUFF) MISC 1 each by Does not apply route 2 times daily 4/25/17   Charo Clements MD   atorvastatin (LIPITOR) 20 MG tablet Take 1 tablet by mouth nightly 3/13/17   Ly Jo MD   albuterol (PROVENTIL) (2.5 MG/3ML) 0.083% nebulizer solution INHALE THE CONTENTS OF 1 VIAL VIA NEBULIZER EVERY 6 HOURS AS NEEDED  FOR  WHEEZING (SUBSTITUTED FOR PROVENTIL) 11/2/16   Geoff Lomeli MD           FAMILY HISTORY: family history includes Asthma in her brother; Cancer in her brother, brother, brother, father, maternal grandmother, and paternal grandmother; Heart Disease in her mother. PHYSICAL EXAM:   BP (!) 141/79   Pulse 72   Temp 98 °F (36.7 °C) (Temporal)   Resp 18   Ht 5' 4\" (1.626 m)   Wt 192 lb 9.6 oz (87.4 kg)   LMP 10/05/1986 (Approximate)   SpO2 96%   BMI 33.06 kg/m²  Body mass index is 33.06 kg/m². Constitutional: She is oriented to person, place, and time. She appears well-developed and well-nourished. In no acute distress. HEENT: Normocephalic and atraumatic. No JVD present. Carotid bruit is not present. No mass and no thyromegaly present. No lymphadenopathy present. Cardiovascular: Normal rate, regular rhythm, normal heart sounds and intact distal pulses. Exam reveals no gallop and no friction rub. No murmur. Pulmonary/Chest: Effort normal and breath sounds normal. No respiratory distress. She has no wheezes, rhonchi or rales. Abdominal: Soft, non-tender. Bowel sounds and aorta are normal. She exhibits no organomegaly, mass or bruit. Extremities: No edema, cyanosis, or clubbing. Pulses are 2+ radial/carotid/dorsalis pedis and posterior tibial bilaterally. Neurological: She is alert and oriented to person, place, and time. No evidence of gross cranial nerve deficit. Coordination appeared normal.   Skin: Skin is warm and dry. There is no rash or diaphoresis. Psychiatric: She has a normal mood and affect.  Her speech is normal and behavior is normal.      MOST RECENT LABS ON RECORD:   Lab Results   Component Value Date    WBC 6.1 11/27/2022    HGB 13.5 11/27/2022    HCT 40.7 11/27/2022     11/27/2022 CHOL 191 01/16/2020    TRIG 217 (H) 01/16/2020    HDL 38 (L) 01/16/2020    ALT 16 07/01/2020    AST 23 07/01/2020     11/27/2022    K 3.9 11/27/2022     11/27/2022    CREATININE 0.69 11/27/2022    BUN 12 11/27/2022    CO2 26 11/27/2022    INR 1.9 11/28/2022    LABA1C 5.7 11/10/2016       ASSESSMENT:  Patient Active Problem List    Diagnosis Date Noted    GERD (gastroesophageal reflux disease)     Hyperlipidemia     Syncope and collapse 11/27/2022    Osteoarthritis of spine with radiculopathy, lumbar region 06/27/2014    Lumbar facet arthropathy 06/27/2014    Failed back syndrome 04/29/2014    DDD (degenerative disc disease), lumbar 04/29/2014    Lumbar facet joint pain 04/29/2014    Lumbar radicular pain 04/29/2014    DDD (degenerative disc disease), cervical 04/29/2014    Cervical spondylosis  S/P Cervical fusion 04/29/2014    Chronic back pain     Cervical radiculopathy     Cervical spondylolysis     Subtherapeutic international normalized ratio (INR) 09/02/2019    Chest pain in adult 09/01/2019    Cervical post-laminectomy syndrome     Myofascial pain     Nosocomial condition 09/27/2018    Sacroiliac joint disease     Arm DVT (deep venous thromboembolism), acute ([address] 07/18/2017    Need for prophylactic vaccination with Streptococcus pneumoniae (Pneumococcus) and Influenza vaccines 04/25/2017    Chronic pain of both shoulders 03/29/2017    Tendon tear 02/03/2017    Fracture of spine, cervical, without spinal cord injury, closed ([address] 12/28/2016    Long term current use of anticoagulant therapy 12/02/2016    Deep vein thrombosis (DVT) of axillary vein of right upper extremity ([address] 10/28/2016    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 Retrosternal chest pain radiated to the back associated with nausea and diaphoresis. No recurrence of chest pain since admission. History of normal cardiac catheterization back in 2017. Echo and stress test are unremarkable. I think her near passing out episode is consistent with vagal stimulation likely from gastric intestinal etiology/diarrhea. Risk factors for CAD include hypertension, dyslipidemia and family history of premature CAD. Giving normal stress test and no recurrence of chest pain at this point along with serial troponin, no further ischemic work-up is indicated at this  . Despite the patient's intermittent chest discomfort, I believe these symptoms are relatively atypical in nature and therefore likely noncardiac. Given their relatively normal stress test, I honestly do not think that further workup for a cardiac source of their symptoms is likely indicated now and would suggest that a workup for an alternative cause of their symptoms be considered if clinically indicated. Having said this, I did reiterate the reality that no test is 100% sensitive and therefore if symptoms persist, worsen and/or clinical suspicion for significant ischemic coronary artery disease remains high, further testing including the possibility of cardiac catheterization with coronary angiography may be appropriate to reconsider. Continue Protonix 40 mg daily. Continue Lipitor and low-dose carvedilol. Recheck lipid panel. Essential hypertension. Continue low-dose carvedilol. May increase dose if needed. Continue to monitor blood pressure as per unit protocol. Hyperlipidemia: Mixed  Cholesterol Reduction Therapy: Continue Atorvastatin (Lipitor) 20 mg daily.  I discussed the potential benefits of statin therapy as well as the potential risks including myalgia as well as the rare but potentially serious complication of liver or kidney damage. Although rare, I told them that this could be serious and therefore told them to stop the medication immediately and call if they developed any severe muscle aches or pains and they agreed to do so. Follow-up repeat lipid panel as above. History of DVT/chronic regulation   Subtherapeutic INR admission. CTA of the chest showed no evidence of pulm embolism. Continue warfarin therapy. Acute diarrhea  Continue management as per primary team.      Once again, thank you for allowing me to participate in this patients care. Please do not hesitate to contact me could I be of further assistance. Sincerely,  Deanne Mckeon MD, MS, F.A.C.C.   [facility], [address] [place] [address], [address]  Phone: [phone], Fax: [phone]      Based on the patients current medical conditions, I believe the risk of significant morbidity and mortality is: Intermediate

## 2022-11-28 NOTE — PROGRESS NOTES
Comprehensive Nutrition Assessment    Type and Reason for Visit:  Initial    Nutrition Recommendations/Plan:   Encourage heart healthy eating. Malnutrition Assessment:  Malnutrition Status:  No malnutrition (11/28/22 1113)    Context:  Acute Illness     Findings of the 6 clinical characteristics of malnutrition:  Energy Intake:  No significant decrease in energy intake  Weight Loss:  No significant weight loss     Body Fat Loss:  No significant body fat loss     Muscle Mass Loss:  No significant muscle mass loss    Fluid Accumulation:  No significant fluid accumulation     Strength:  Not Performed    Nutrition Assessment:    No nutrition diagnosis at this time. NPO and asleep prior to testing upon visit. Has had education in past on heart healthy diet. Nutrition Related Findings:    no malnutrition indices. Wound Type: None       Current Nutrition Intake & Therapies:    Average Meal Intake: 51-75%  Average Supplements Intake: None Ordered  ADULT DIET; Regular    Anthropometric Measures:  Height: 5' 4\" (162.6 cm)  Ideal Body Weight (IBW): 120 lbs (55 kg)    Admission Body Weight: 198 lb (89.8 kg)  Current Body Weight: 192 lb 9.6 oz (87.4 kg), 160.5 % IBW. Weight Source: Bed Scale  Current BMI (kg/m2): 33  Usual Body Weight: 189 lb (85.7 kg) (in June)  % Weight Change (Calculated): 1.9  Weight Adjustment For: No Adjustment                 BMI Categories: Obese Class 1 (BMI 30.0-34. 9)    Estimated Daily Nutrient Needs:  Energy Requirements Based On: Kcal/kg  Weight Used for Energy Requirements: Current  Energy (kcal/day): 8114-5361 (18-22)  Weight Used for Protein Requirements: Ideal  Protein (g/day): 65-76 (1.2-1.4)  Method Used for Fluid Requirements: 1 ml/kcal  Fluid (ml/day): 1900    Nutrition Diagnosis:   No nutrition diagnosis at this time       Lab Results   Component Value Date     11/27/2022    K 3.9 11/27/2022     11/27/2022    CO2 26 11/27/2022    BUN 12 11/27/2022    CREATININE 0.69 11/27/2022    GLUCOSE 107 (H) 11/27/2022    CALCIUM 9.4 11/27/2022    PROT 6.9 07/01/2020    LABALBU 4.2 07/01/2020    BILITOT 0.33 07/01/2020    ALKPHOS 94 07/01/2020    AST 23 07/01/2020    ALT 16 07/01/2020    LABGLOM >60 11/27/2022    GFRAA >60 07/01/2020     Hemoglobin A1C   Date Value Ref Range Status   11/10/2016 5.7 % Final     No results found for: VITD25    Nutrition Interventions:   Food and/or Nutrient Delivery: Continue Current Diet  Nutrition Education/Counseling: No recommendation at this time  Coordination of Nutrition Care: Continue to monitor while inpatient  Plan of Care discussed with: no one    Goals:     Goals: Meet at least 75% of estimated needs       Nutrition Monitoring and Evaluation:   Behavioral-Environmental Outcomes: None Identified  Food/Nutrient Intake Outcomes: Food and Nutrient Intake  Physical Signs/Symptoms Outcomes: Biochemical Data, Weight    Discharge Planning:    No discharge needs at this time     Ina Soria, [address] [place] [address], [address] [place] [address] Street: [phone]

## 2022-11-28 NOTE — PROGRESS NOTES
SW met with pt to complete assessment. Pt is alert and oriented and cooperative with assessment. Pt is a 81year old  female admitted for syncope and collapse. Pt lives with her spouse in their home in [place]. Pt reports that she has a cane, walker, wheelchair, nebulizer and and shower chair and grab bars. Pt reports that she is not using any community services. Pt reports that her spouse transports her to most of her appointments but she can drive if needed. Pt is a full code and follows with Dr Harjinder Montes as PCP. Pt does not have any advance directives and reports that her spouse would be her decision maker if needed. Pt reports that her medications are affordable with her insurance. Pt plans to return home with spouse at discharge. Pt identifies no discharge needs or concerns at this time. SW will follow and remain available as needed.  Rima PASCUALW 11/28/2022

## 2022-11-28 NOTE — PROCEDURES
[address] [address]                              CARDIAC STRESS TEST    PATIENT NAME: Tina Dominguez                   :        1950  MED REC NO:   [id number]                              ROOM:       0310  ACCOUNT NO:   [de-identified]                           ADMIT DATE: 2022  PROVIDER:     Charu Aguillon MD    CARDIOVASCULAR DIAGNOSTIC DEPARTMENT    DATE OF STUDY:  2022    ORDERING PROVIDER:  Isaiah Smyth MD    PRIMARY CARE PROVIDER:  Sharrell Ganser. Rhea Horton MD    INTERPRETING PHYSICIAN:  Rupal Maynard. Swathi Arriaga MD    PHARMACOLOGIC MYOCARDIAL PERFUSION STRESS TESTING    Rest/Stress single isotope SPECT imaging with exercise stress and gated  SPECT imaging. INDICATIONS:  Assessment of chest pain and/or chest discomfort. CLINICAL HISTORY:  The patient is a 74-year-old woman with no known  coronary artery disease. Previous cardiac history includes:  Stress test.    Other previous history includes:  Chest pain, fatigue, dyspnea,  caffeine, hypertension, family history of coronary artery disease <60 in  mother. Symptoms just prior to testing include:  None. Relevant medications:  Carvedilol (Coreg). PROCEDURE:  The heart rate was 69 at baseline and geovanni to 108 beats per  minute during the regadenoson infusion. The rest blood pressure was  112/56 mm/Hg and increased to 122/64 mm/Hg. The patient did complain of  shortness of breath following infusion. Pharmacologic stress testing was performed with regadenoson at a dose of  0.4 mg. Additionally, low level exercise using hand compressions were  performed along with vasodilator infusion. MYOCARDIAL PERFUSION IMAGING:  Imaging was performed at rest 30-45  minutes following the injection of 10 mCi of sestamibi. Approximately  10 seconds after Lexiscan injection, the patient was injected with 30  mCi of sestamibi.   Gating post-stress tomographic imaging was performed  30-45 minutes after stress. STRESS ECG RESULTS:  The resting electrocardiogram demonstrated normal  sinus rhythm without significant ST-segment abnormalities that may  impair accurate ECG detection of stress induced cardiac ischemia. During vasodilator infusion and during recovery, the patient developed:    No significant ST segment changes suggestive of myocardial ischemia with  no premature atrial contractions (PACs) and no premature ventricular  contractions (PVCs). NUCLEAR IMAGING RESULTS:  The overall quality of the study is fair. No  significant attenuation artifact was seen. There is no evidence of  abnormal lung uptake. Additionally, the right ventricle appears normal.  The left ventricular cavity is noted to be normal in size on the stress  images. There is no evidence of transient ischemic dilatation (TID) of  the left ventricle. Gated SPECT imaging reveals normal myocardial thickening and wall motion  with a calculated left ventricular ejection fraction of 70%. The rest images demonstrate homogeneous tracer distribution throughout  the myocardium. SPECT images demonstrate homogeneous tracer distribution throughout the  myocardium. IMPRESSION:  1. Normal myocardial perfusion imaging without significant evidence of  myocardial ischemia or infarction. 2.  Global left ventricular systolic function was normal with an  ejection fraction of 70%, without regional wall motion abnormalities. 3.  No significant electrocardiographic evidence of myocardial ischemia  during EKG monitoring without significant associated arrhythmias. Overall, these results are most consistent with a low risk for  significant coronary artery disease. The sensitivity for detecting ischemia on this test may have been  reduced due to the patient being on a beta blocker.         Tommie Mistry MD    D: 11/28/2022 15:36:58       T: 11/28/2022 15:40:17     KASANDRA/RODY  Job#: [id number] Doc#: Unknown    CC:  Reed Schrader

## 2022-11-28 NOTE — PROGRESS NOTES
Progress Note    SUBJECTIVE:    Patient seen for f/u of Syncope and collapse. She resting in bed in no distress. Denied any further dizziness or syncope     ROS:   Constitutional: negative  for fevers, and negative for chills. Respiratory: negative for shortness of breath, negative for cough, and negative for wheezing  Cardiovascular: negative for chest pain, and negative for palpitations  Gastrointestinal: negative for abdominal pain, negative for nausea,negative for vomiting, negative for diarrhea, and negative for constipation     All other systems were reviewed with the patient and are negative unless otherwise stated in HPI      OBJECTIVE:      Vitals:   Vitals:    11/28/22 0011   BP: 115/62   Pulse: 87   Resp: 20   Temp: 98.3 °F (36.8 °C)   SpO2: 97%     Weight: 192 lb 9.6 oz (87.4 kg)   Height: 5' 4\" (162.6 cm)     Weight  Wt Readings from Last 3 Encounters:   11/28/22 192 lb 9.6 oz (87.4 kg)   06/30/22 189 lb (85.7 kg)   06/09/22 189 lb (85.7 kg)     Body mass index is 33.06 kg/m². 24HR INTAKE/OUTPUT:      Intake/Output Summary (Last 24 hours) at 11/28/2022 0707  Last data filed at 11/28/2022 0008  Gross per 24 hour   Intake 1970.15 ml   Output 1100 ml   Net 870.15 ml     -----------------------------------------------------------------  Exam:    GEN:    Awake, alert and oriented x3. EYES:  EOMI, pupils equal   NECK: Supple. No lymphadenopathy. No carotid bruit  CVS:    regular rate and rhythm, 2/6 systolic murmur  PULM:  CTA, no wheezes, rales or rhonchi, no acute respiratory distress  ABD:    Bowels sounds normal.  Abdomen is soft. No distention. no tenderness to palpation. EXT:   no edema bilaterally . No calf tenderness. NEURO: Moves all extremities. Motor and sensory are grossly intact  SKIN:  No rashes.   No skin lesions.    -----------------------------------------------------------------    Diagnostic Data:      Complete Blood Count:   Recent Labs     11/27/22  0110 11/27/22  1316 WBC 6.9 6.1   RBC 4.37 4.51   HGB 13.2 13.5   HCT 39.7 40.7   MCV 90.8 90.2   MCH 30.2 29.9   MCHC 33.2 33.2   RDW 13.8 13.8    228   MPV 9.4 9.7        Last 3 Blood Glucose:   Recent Labs     11/27/22  0110   GLUCOSE 107*        Comprehensive Metabolic Profile:   Recent Labs     11/27/22  0110      K 3.9      CO2 26   BUN 12   CREATININE 0.69   GLUCOSE 107*   CALCIUM 9.4        Urinalysis:   Lab Results   Component Value Date/Time    NITRU NEGATIVE 11/27/2022 05:33 AM    COLORU Yellow 11/27/2022 05:33 AM    PHUR 7.0 11/27/2022 05:33 AM    WBCUA 0 TO 2 11/27/2022 05:33 AM    RBCUA 0 TO 2 11/27/2022 05:33 AM    MUCUS NOT REPORTED 01/14/2020 12:18 PM    TRICHOMONAS NOT REPORTED 01/14/2020 12:18 PM    YEAST NOT REPORTED 01/14/2020 12:18 PM    BACTERIA TRACE 11/27/2022 05:33 AM    SPECGRAV <1.005 11/27/2022 05:33 AM    LEUKOCYTESUR NEGATIVE 11/27/2022 05:33 AM    UROBILINOGEN Normal 11/27/2022 05:33 AM    BILIRUBINUR NEGATIVE 11/27/2022 05:33 AM    GLUCOSEU NEGATIVE 11/27/2022 05:33 AM    KETUA NEGATIVE 11/27/2022 05:33 AM    AMORPHOUS NOT REPORTED 01/14/2020 12:18 PM       HgBA1c:    Lab Results   Component Value Date/Time    LABA1C 5.7 11/10/2016 12:27 PM       Lactic Acid: No results found for: LACTA     Troponin: No results for input(s): TROPONINI in the last 72 hours. CRP:  No results for input(s): CRP in the last 72 hours. Radiology/Imaging:  CT CHEST PULMONARY EMBOLISM W CONTRAST   Final Result   No evidence of pulmonary embolism or acute pulmonary abnormality. XR CHEST (2 VW)   Final Result   No acute cardiopulmonary process.          VL DUP CAROTID BILATERAL    (Results Pending)   NM MYOCARDIAL SPECT REST EXERCISE OR RX    (Results Pending)         ASSESSMENT / PLAN:  Syncope and collapse  Continue current therapy   Appreciate cardiology  Stress test  Echocardiogram  Essential hypertension  Continue Coreg  Chest pain  Appreciate cardiology  Stress test  Echocardiogram  Continue Lipitor  Nutrition status:   obesity, non-morbid  Dietician consult initiated  Hospital Prophylaxis:   DVT: Warfarin   Stress Ulcer: PPI   High risk medications: Warfarin   Disposition:    Discharge plan is home tomorrow      CHRISTIAN Patel - CNP , CHRISTIAN, NP-C  [facility]        11/28/2022, 7:07 AM

## 2022-11-28 NOTE — PROGRESS NOTES
Writer at bedside at this time to perform second shift assessment. Patient is lying in bed at this time. Vital signs taken and assessment completed at this time. Patient is alert and oriented and has complaint of chronic back pain 3/10 at this time; patient denies wanting anything for pain. Patient denies any further needs at this time. Call light within reach, will continue to monitor.

## 2022-11-28 NOTE — PROGRESS NOTES
Patient in bed at this time resting. Vitals and assessment completed. Vitals stable. Patient on room air. Patient denies having needs or concerns. Patient denies any pain or dizziness. Call light within reach. Will continue to monitor.

## 2022-11-28 NOTE — PROGRESS NOTES
Patient in bed at this time and returned from stress test. Vitals  and assessment completed. Vitals stable. Patient on room air. Writer clarified with nurse practitioner that patient may eat. Writer called kitched and ordered food. PRN medication for diarrhea administered along with PRN pain medication. Patient denies any needs or concerns. Call light within reach. Will continue to monitor.

## 2022-11-28 NOTE — PROGRESS NOTES
Spoke with Dr. Neptali Basilio at this time pertaining patient's request for a sleep aide. See new orders.

## 2022-11-28 NOTE — PROGRESS NOTES
Writer at bedside at this time to perform initial shift assessment. Patient is lying in bed at this time. Vital signs taken and assessment completed at this time. Patient is alert and oriented and has no complaint of pain at this time. Patient denies any further needs at this time. Call light within reach, will continue to monitor.

## 2022-11-28 NOTE — PLAN OF CARE
Problem: Discharge Planning  Goal: Discharge to home or other facility with appropriate resources  Outcome: Progressing  Flowsheets  Taken 11/28/2022 0039 by Linda Gomez RN  Discharge to home or other facility with appropriate resources:   Identify barriers to discharge with patient and caregiver   Arrange for needed discharge resources and transportation as appropriate  Problem: Safety - Adult  Goal: Free from fall injury  Outcome: Progressing  Flowsheets (Taken 11/28/2022 0039)  Free From Fall Injury: Instruct family/caregiver on patient safety     Problem: Pain  Goal: Verbalizes/displays adequate comfort level or baseline comfort level  Outcome: Progressing  Flowsheets (Taken 11/28/2022 0039)  Verbalizes/displays adequate comfort level or baseline comfort level:   Administer analgesics based on type and severity of pain and evaluate response   Encourage patient to monitor pain and request assistance   Assess pain using appropriate pain scale   Implement non-pharmacological measures as appropriate and evaluate response  Note: Pain assessed every four hours and as needed using 0-10 pain scale. Pt educated on scale and uses scale appropriately. Encourage pt to notify staff of pain before pain becomes uncontrollable. Correlate periods of heavy activity after pain medication administration. Use pharmacological and non pharmacological methods for pain relief such as: warm blankets, ice, television, reading, or rest.        Note:  working with patient.

## 2022-11-29 VITALS
HEART RATE: 68 BPM | RESPIRATION RATE: 18 BRPM | BODY MASS INDEX: 31.92 KG/M2 | TEMPERATURE: 97.9 F | SYSTOLIC BLOOD PRESSURE: 124 MMHG | WEIGHT: 187 LBS | OXYGEN SATURATION: 96 % | HEIGHT: 64 IN | DIASTOLIC BLOOD PRESSURE: 62 MMHG

## 2022-11-29 LAB
ANION GAP SERPL CALCULATED.3IONS-SCNC: 11 MMOL/L (ref 9–17)
BUN BLDV-MCNC: 10 MG/DL (ref 8–23)
BUN/CREAT BLD: 15 (ref 9–20)
CALCIUM SERPL-MCNC: 8.8 MG/DL (ref 8.6–10.4)
CHLORIDE BLD-SCNC: 107 MMOL/L (ref 98–107)
CHOLESTEROL/HDL RATIO: 4
CHOLESTEROL: 152 MG/DL
CO2: 23 MMOL/L (ref 20–31)
CREAT SERPL-MCNC: 0.66 MG/DL (ref 0.5–0.9)
ESTIMATED AVERAGE GLUCOSE: 111 MG/DL
GFR SERPL CREATININE-BSD FRML MDRD: >60 ML/MIN/1.73M2
GLUCOSE BLD-MCNC: 97 MG/DL (ref 70–99)
HBA1C MFR BLD: 5.5 % (ref 4–6)
HDLC SERPL-MCNC: 38 MG/DL
INR BLD: 2.7
LDL CHOLESTEROL: 79 MG/DL (ref 0–130)
POTASSIUM SERPL-SCNC: 4.1 MMOL/L (ref 3.7–5.3)
PROTHROMBIN TIME: 28.5 SEC (ref 11.5–14.2)
SODIUM BLD-SCNC: 141 MMOL/L (ref 135–144)
TRIGL SERPL-MCNC: 173 MG/DL

## 2022-11-29 PROCEDURE — 94761 N-INVAS EAR/PLS OXIMETRY MLT: CPT

## 2022-11-29 PROCEDURE — 6370000000 HC RX 637 (ALT 250 FOR IP): Performed by: FAMILY MEDICINE

## 2022-11-29 PROCEDURE — 80048 BASIC METABOLIC PNL TOTAL CA: CPT

## 2022-11-29 PROCEDURE — 36415 COLL VENOUS BLD VENIPUNCTURE: CPT

## 2022-11-29 PROCEDURE — G0378 HOSPITAL OBSERVATION PER HR: HCPCS

## 2022-11-29 PROCEDURE — 85610 PROTHROMBIN TIME: CPT

## 2022-11-29 RX ADMIN — CARVEDILOL 3.12 MG: 3.12 TABLET, FILM COATED ORAL at 08:45

## 2022-11-29 RX ADMIN — OXYCODONE HYDROCHLORIDE AND ACETAMINOPHEN 1 TABLET: 5; 325 TABLET ORAL at 11:24

## 2022-11-29 RX ADMIN — PANTOPRAZOLE SODIUM 40 MG: 40 TABLET, DELAYED RELEASE ORAL at 07:12

## 2022-11-29 RX ADMIN — CALCIUM CARBONATE-VITAMIN D TAB 500 MG-200 UNIT 1 TABLET: 500-200 TAB at 08:45

## 2022-11-29 RX ADMIN — CYANOCOBALAMIN TAB 1000 MCG 1000 MCG: 1000 TAB at 08:45

## 2022-11-29 ASSESSMENT — PAIN SCALES - GENERAL
PAINLEVEL_OUTOF10: 7
PAINLEVEL_OUTOF10: 0

## 2022-11-29 ASSESSMENT — PAIN DESCRIPTION - ORIENTATION: ORIENTATION: LOWER

## 2022-11-29 ASSESSMENT — PAIN DESCRIPTION - LOCATION: LOCATION: BACK;NECK

## 2022-11-29 ASSESSMENT — PAIN DESCRIPTION - DESCRIPTORS: DESCRIPTORS: ACHING

## 2022-11-29 NOTE — PROGRESS NOTES
Patient made aware of appointment scheduled. Patient left facility at this time for discharge to home via private transportation.

## 2022-11-29 NOTE — PROGRESS NOTES
Physician Progress Note      Deonna Tinoco  CSN #:                  [id number]  :                       1950  ADMIT DATE:       2022 12:55 AM  DISCH DATE:  RESPONDING  PROVIDER #:        Dinorah Gallagher MD          QUERY TEXT:    Pt admitted for further evaluation of chest pain. Pt noted to have a negative   cardiac work up. If possible, please document in progress notes and discharge   summary if you are evaluating and/or treating any of the following: The medical record reflects the following:  -Risk Factors: Asthma, HTN, DVT/Coumadin, HLD, HTN  -Clinical Indicators: Admitted for evaluation of CP, dizziness and near   syncope. Symptoms resolved in ED, however, physician stated patient admitted   for work up due to a heart score of 5. Cardiac cath in 2017 showed normal   coronary arteries. Cardiac stress showed no evidence of myocardial ischemia   and low risk for significant CAD; Per Cardiology Consultant \"Her troponin is   serially normal.  BNP is normal.  Kidney function is unremarkable. CT of the   chest showed no evidence of pulmonary. INR is subtherapeutic. Echo and   stress test are fairly unremarkable. Despite the patien  -Treatment: Admission, Echo, Cardiac stress, Cardiology consult. Options provided:  -- Chest pain due to CAD with unstable angina  -- Chest pain due to CAD without angina  -- Chest pain due to GERD  -- Chest pain due to, please specify. -- Other - I will add my own diagnosis  -- Disagree - Not applicable / Not valid  -- Disagree - Clinically unable to determine / Unknown  -- Refer to Clinical Documentation Reviewer    PROVIDER RESPONSE TEXT:    This patient has chest pain due to GERD.     Query created by: Anne Lynch on 2022 9:53 AM      Electronically signed by:  Dinorah Gallagher MD 2022 11:24 AM

## 2022-11-29 NOTE — PROGRESS NOTES
Patient in bed at this time resting. Vitals and assessment completed. Patient is alert and oriented. Patient stated she feels much better than she did yesterday. Patient stated she has not had any bowel movement since imodium given yesterday. Patient denies any needs or concerns. Call light within reach. Will continue to monitor.

## 2022-11-29 NOTE — PROGRESS NOTES
Patient shift assessment and vitals completed at this time as charted. Patient denies pain at this time. Patient is laying in bed watching TV and states no needs, call light is in reach, will continue to monitor.

## 2022-11-29 NOTE — PROGRESS NOTES
Patient reassessment and vitals completed at this time. Patient is in bed and states no pain. Patient denies needs at this time. Her call light is in reach, will continue to monitor.

## 2022-11-29 NOTE — DISCHARGE SUMMARY
Discharge Summary    Anthony Dueñas  :  1950  MRN:  [id number]    Admit date:  2022      Discharge date: 2022     Admitting Physician:  Lexie Brown MD    Discharge Diagnoses:    Principal Problem:    Near syncope  Active Problems:    Hyperlipidemia    Acute diarrhea    Failed back syndrome    Primary hypertension    Mixed dyslipidemia    Atypical chest pain    Chest pain in adult  Resolved Problems:    * No resolved hospital problems. *      Hospital Course:   Anthony Dueñas is a 81 y.o. female admitted with near syncope. She presented to the emergency room with complaints of chest pain and dizziness. Patient has history of hypertension and chronic back pain as well as asthma neuropathy. Patient has history of DVT and is on Coumadin. Patient complained of syncopal episode at home with chest pain and shortness of breath. Initial EKG and troponins were normal.  During her admission cardiology was consulted and patient completed an echocardiogram as well as stress test and carotid Dopplers. All tests were normal.  Patient admitted to diarrhea at home and household had diarrhea as well. Stool studies were ordered however patient has had no stools since noon yesterday and was unable to be collected. Hemodynamically she is stable she is tolerating diet and activity well. She has had no further symptoms. Patient will be discharged home today she will follow-up with her primary care as an outpatient. Consultants:  Dr. Blu Dietz, cardiology    Procedures: Stress Test    Complications: none    Discharge Condition: fair    Exam:  GEN:    Awake, alert and oriented x3. EYES:   EOMI, pupils equal   NECK: Supple. No lymphadenopathy. No carotid bruit  CVS:     regular rate and rhythm, 2/6 systolic murmur  PULM:  CTA, no wheezes, rales or rhonchi, no acute respiratory distress  ABD:     Bowels sounds normal.  Abdomen is soft. No distention. no tenderness to palpation.    EXT:     no edema bilaterally . No calf tenderness. NEURO: Moves all extremities. Motor and sensory are grossly intact  SKIN:    No rashes. No skin lesions. Significant Diagnostic Studies:   Lab Results   Component Value Date    WBC 6.1 11/27/2022    HGB 13.5 11/27/2022     11/27/2022       Lab Results   Component Value Date    BUN 10 11/29/2022    CREATININE 0.66 11/29/2022     11/29/2022    K 4.1 11/29/2022    CALCIUM 8.8 11/29/2022     11/29/2022    CO2 23 11/29/2022    LABGLOM >60 11/29/2022       Lab Results   Component Value Date    WBCUA 0 TO 2 11/27/2022    RBCUA 0 TO 2 11/27/2022    EPITHUA 0 TO 2 11/27/2022    LEUKOCYTESUR NEGATIVE 11/27/2022    SPECGRAV <1.005 (L) 11/27/2022    GLUCOSEU NEGATIVE 11/27/2022    KETUA NEGATIVE 11/27/2022    PROTEINU NEGATIVE 11/27/2022    HGBUR NEGATIVE 11/27/2022    CASTUA NOT REPORTED 01/14/2020    CRYSTUA NOT REPORTED 01/14/2020    BACTERIA TRACE (A) 11/27/2022    YEAST NOT REPORTED 01/14/2020       XR CHEST (2 VW)    Result Date: 11/27/2022  EXAMINATION: TWO XRAY VIEWS OF THE CHEST 11/27/2022 1:22 am COMPARISON: 01/25/2020. HISTORY: ORDERING SYSTEM PROVIDED HISTORY: Pain TECHNOLOGIST PROVIDED HISTORY: Pain FINDINGS: Frontal and lateral views. Normal lung volume. No focal airspace disease. Normal pulmonary vasculature. Remote granulomatous disease. No pleural effusion or pneumothorax. Normal cardiomediastinal silhouette and great vessels. Multilevel degenerative disc disease. Partially visualized lumbar spinal fusion hardware. Lower cervical spine fusion hardware. No acute cardiopulmonary process. CT CHEST PULMONARY EMBOLISM W CONTRAST    Result Date: 11/27/2022  EXAMINATION: CTA OF THE CHEST 11/27/2022 2:42 am TECHNIQUE: CTA of the chest was performed after the administration of intravenous contrast.  Multiplanar reformatted images are provided for review. MIP images are provided for review.  Automated exposure control, iterative reconstruction, and/or weight based adjustment of the mA/kV was utilized to reduce the radiation dose to as low as reasonably achievable. COMPARISON: 01/17/2020 HISTORY: ORDERING SYSTEM PROVIDED HISTORY: h/o DVt/Pe subtherapeutic on INR, and chest pain, near syncope, SOB TECHNOLOGIST PROVIDED HISTORY: h/o DVt/Pe subtherapeutic on INR, and chest pain, near syncope, SOB Decision Support Exception - unselect if not a suspected or confirmed emergency medical condition->Emergency Medical Condition (MA) FINDINGS: Pulmonary Arteries:  No central, lobar, or segmental pulmonary embolus. Mediastinum: Small hiatal hernia. No thoracic adenopathy. Heart size is normal.  No pericardial effusion. Thoracic aorta is normal caliber. Lungs/pleura: The lungs are without acute process. No focal consolidation or pulmonary edema. No evidence of pleural effusion or pneumothorax. Upper Abdomen: Limited images of the upper abdomen are unremarkable. Soft Tissues/Bones: No acute bone or soft tissue abnormality. No evidence of pulmonary embolism or acute pulmonary abnormality.      VL DUP CAROTID BILATERAL    Result Date: 11/28/2022    formerly [facility]  Vascular Carotid Procedure   Patient Name Wong Saleem      Date of Study           11/28/2022               [facility]   Date of      1950   Gender                  Female  Birth   Age          81 year(s)   Race                       Room Number  0310         Height:                 64 inch, 162.56 cm   Corporate ID [id number]     Weight:                 190 pounds, 86.2 kg  #   Patient Acct [de-identified]    BSA:        1.91 m^2    BMI:         32.61  #                                                              kg/m^2   MR #         [id number]       Norma Taylor RVT   Accession #  [id number]  Interpreting Physician  Arma Alpers, MD   Referring                 Referring Physician     Marielena Wei  Nurse  Practitioner  Additional Comments Preliminary results were faxed to the floor 11/28/2022 @ 5061. Procedure Type of Study:   Cerebral: Carotid, Carotid Scan Bilateral.  Indications for Study:Syncope. Patient Status: In Patient. Technical Quality:Adequate visualization. Comments:Simultaneous real time imaging utilizing B-Mode, color flow doppler and spectral waveform analysis was performed on the Bilateral extracerebral vascular system. Basic Classification of ICA Stenosis: PSV - Peak Systolic Velocity Normal: No plaque or calcification identified, no elevation of PSV Mild: <50% spectral broadening without increased PSV Moderate: 50 - 69% PSV >125 - <230 cm/sec Severe: 70 - 99% PSV >230 cm/sec Critical: 80 - 99% PSV >230cm/sec and/or End Diastolic Velocities >304LS/AZE. Conclusions   Summary   Mild 16-49% stenosis of the internal carotid arteries bilaterally. Signature   ----------------------------------------------------------------  Electronically signed by Corey Enamorado RVT(Sonographer) on  11/28/2022 10:54 AM  ----------------------------------------------------------------   ----------------------------------------------------------------  Electronically signed by Ashlee Ngo MD(Interpreting  physician) on 11/28/2022 06:04 PM  ----------------------------------------------------------------  Findings:   Right Impression:                    Left Impression:  Intimal thickening of the CCA. Intimal thickening of the CCA. Heterogeneous plaque noted in the    Heterogeneous plaque noted in the  bulb and proximal ICA. bulb and proximal ICA. Peak systolic velocities are within  Peak systolic velocities are within  normal limits. normal limits. No spectral broadening is noted. No spectral broadening is noted. Vertebral artery flow is antegrade . Vertebral artery flow is antegrade . Allergies   - Allergy:*Unlisted(Drug).  Comments:morphine, xarelto, tape Velocities are measured in cm/s ; Diameters are measured in cm Carotid Right Measurements +------------+--------+--------+--------+-------+------------+-------------+ ! Location    ! PSV     ! EDV     ! Angle   ! RI     !%Stenosis   ! Tortuosity   ! +------------+--------+--------+--------+-------+------------+-------------+ ! Prox CCA    !79.42   !16.82   !        !0.79   !            !             ! +------------+--------+--------+--------+-------+------------+-------------+ ! Mid CCA     !71.58   !19.43   !        !0.73   !            !             ! +------------+--------+--------+--------+-------+------------+-------------+ ! Dist CCA    ! 82.01   !24.64   !        !0.7    !            !             ! +------------+--------+--------+--------+-------+------------+-------------+ ! Bulb        !76.83   !19.67   !        !0.74   !            !             ! +------------+--------+--------+--------+-------+------------+-------------+ ! Prox ICA    !99.97   !24.06   !        !0.76   !            !             ! +------------+--------+--------+--------+-------+------------+-------------+ ! Mid ICA     !83.81   !30.52   !        !0.64   !            !             ! +------------+--------+--------+--------+-------+------------+-------------+ ! Dist ICA    !64.57   !27.05   !        !0.58   !            !             ! +------------+--------+--------+--------+-------+------------+-------------+ ! Prox ECA    !88.92   !8.68    !        !0.9    !            !             ! +------------+--------+--------+--------+-------+------------+-------------+ ! Vertebral   !52.25   !16.49   !        !0.68   !            !             ! +------------+--------+--------+--------+-------+------------+-------------+   - There is antegrade vertebral flow noted on the right side. - Additional Measurements:ICAPSV/CCAPSV 1.26. ICAEDV/CCAEDV 1.81. Carotid Left Measurements +------------+--------+--------+--------+-------+------------+-------------+ ! Location    ! PSV     ! EDV     ! Angle   ! RI !%Stenosis   ! Tortuosity   ! +------------+--------+--------+--------+-------+------------+-------------+ ! Prox CCA    !76.22   !16.69   !        !0.78   !            !             ! +------------+--------+--------+--------+-------+------------+-------------+ ! Mid CCA     !76.22   !20.58   !        !0.73   !            !             ! +------------+--------+--------+--------+-------+------------+-------------+ ! Dist CCA    !81.4    !17.99   !        !0.78   !            !             ! +------------+--------+--------+--------+-------+------------+-------------+ ! Bulb        !54      !16.49   !        !0.69   !            !             ! +------------+--------+--------+--------+-------+------------+-------------+ ! Prox ICA    !71.33   !19.67   !        !0.72   !            !             ! +------------+--------+--------+--------+-------+------------+-------------+ ! Mid ICA     !87.87   !27.05   !        !0.69   !            !             ! +------------+--------+--------+--------+-------+------------+-------------+ ! Dist ICA    ! 94.91   !35.11   !        !0.63   !            !             ! +------------+--------+--------+--------+-------+------------+-------------+ ! Prox ECA    !79.03   !4.29    !        !0.95   !            !             ! +------------+--------+--------+--------+-------+------------+-------------+ ! Vertebral   !74.93   !23.16   !        !0.69   !            !             ! +------------+--------+--------+--------+-------+------------+-------------+   - There is antegrade vertebral flow noted on the left side. - Additional Measurements:ICAPSV/CCAPSV 1.25. ICAEDV/CCAEDV 2.1.       Assessment and Plan:  Patient Active Problem List    Diagnosis Date Noted    GERD (gastroesophageal reflux disease)     Hyperlipidemia     Acute diarrhea 11/28/2022    Near syncope 11/27/2022    Osteoarthritis of spine with radiculopathy, lumbar region 06/27/2014    Lumbar facet arthropathy 06/27/2014    Failed back syndrome 04/29/2014    DDD (degenerative disc disease), lumbar 04/29/2014    Lumbar facet joint pain 04/29/2014    Lumbar radicular pain 04/29/2014    DDD (degenerative disc disease), cervical 04/29/2014    Cervical spondylosis  S/P Cervical fusion 04/29/2014    Chronic back pain     Cervical radiculopathy     Cervical spondylolysis     Subtherapeutic international normalized ratio (INR) 09/02/2019    Chest pain in adult 09/01/2019    Cervical post-laminectomy syndrome     Myofascial pain     Nosocomial condition 09/27/2018    Sacroiliac joint disease     Arm DVT (deep venous thromboembolism), acute ([address] 07/18/2017    Need for prophylactic vaccination with Streptococcus pneumoniae (Pneumococcus) and Influenza vaccines 04/25/2017    Chronic pain of both shoulders 03/29/2017    Tendon tear 02/03/2017    Fracture of spine, cervical, without spinal cord injury, closed ([address] 12/28/2016    Long term current use of anticoagulant therapy 12/02/2016    Deep vein thrombosis (DVT) of axillary vein of right upper extremity ([address] 10/28/2016    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    Acute deep vein thrombosis (DVT) of axillary vein of right upper extremity (HCC)     Pure hypercholesterolemia     POONAM (obstructive sleep apnea)     Atypical chest pain 10/19/2016    S/P lumbar fusion 10/18/2016    Varicose veins of both lower extremities 06/09/2016    B12 deficiency 04/11/2016    Unstable angina ([address]     Primary hypertension     Mixed dyslipidemia     Anxiety 07/27/2015    Menopausal and postmenopausal disorder 01/29/2015    Anemia, pernicious 01/29/2015    Hiatal hernia 01/29/2015        Discharge Medications:         Medication List        CONTINUE taking these medications      albuterol (2.5 MG/3ML) 0.083% nebulizer solution  Commonly known as: PROVENTIL  INHALE THE CONTENTS OF 1 VIAL VIA NEBULIZER EVERY 6 HOURS AS NEEDED  FOR  WHEEZING (SUBSTITUTED FOR PROVENTIL)     atorvastatin 20 MG tablet  Commonly known as: LIPITOR  Take 1 tablet by mouth nightly     Blood Pressure Cuff Misc  1 each by Does not apply route 2 times daily     calcium-vitamin D 500-200 MG-UNIT per tablet  Commonly known as: OSCAL-500     carvedilol 3.125 MG tablet  Commonly known as: COREG  Take 1 tablet by mouth 2 times daily (with meals)     LORazepam 1 MG tablet  Commonly known as: ATIVAN     nitroGLYCERIN 0.4 MG SL tablet  Commonly known as: NITROSTAT     omeprazole 40 MG delayed release capsule  Commonly known as: PRILOSEC     oxyCODONE-acetaminophen 5-325 MG per tablet  Commonly known as: PERCOCET     Vitamin B-12 1000 MCG Subl     warfarin 2.5 MG tablet  Commonly known as: COUMADIN            STOP taking these medications      alendronate 70 MG tablet  Commonly known as: FOSAMAX     cyclobenzaprine 10 MG tablet  Commonly known as: FLEXERIL     enoxaparin 150 MG/ML injection  Commonly known as: LOVENOX     predniSONE 10 MG tablet  Commonly known as: DELTASONE     tiZANidine 4 MG tablet  Commonly known as: ZANAFLEX              Patient Instructions:    Activity: activity as tolerated  Diet: encourage fluids  Wound Care: none needed  Other: None    Disposition:   Discharge to Home    Follow up:  Patient will be followed by Mary Cisneros in 1-2 weeks    CORE MEASURES on Discharge (if applicable)  ACE/ARB in CHF: NA  Statin in MI: NA  ASA in MI: NA  Statin in CVA: NA  Antiplatelet in CVA: NA    Total time spent on discharge services: 40 minutes    Including the following activities:  Evaluation and Management of patient  Discussion with patient and/or surrogate about current care plan  Coordination with Case Management and/or   Coordination of care with Consultants (if applicable)   Coordination of care with Receiving Facility Physician (if applicable)  Completion of DME forms (if applicable)  Preparation of Discharge Summary  Preparation of Medication Reconciliation  Preparation of Discharge Prescriptions    Signed:  CHRISTIAN Mcintosh - DAMIÁN, APRN, NP-C  11/29/2022, 11:54 AM

## 2022-11-29 NOTE — PROGRESS NOTES
Progress Note    SUBJECTIVE:    Patient seen for f/u of Near syncope. She resting in bed in no distress. Denied any further dizziness or syncope n  No further stools since noon yesterday    ROS:   Constitutional: negative  for fevers, and negative for chills. Respiratory: negative for shortness of breath, negative for cough, and negative for wheezing  Cardiovascular: negative for chest pain, and negative for palpitations  Gastrointestinal: negative for abdominal pain, negative for nausea,negative for vomiting, negative for diarrhea, and negative for constipation     All other systems were reviewed with the patient and are negative unless otherwise stated in HPI      OBJECTIVE:      Vitals:   Vitals:    11/29/22 0713   BP: 130/67   Pulse: 70   Resp: 18   Temp: 97.9 °F (36.6 °C)   SpO2: 96%     Weight: 187 lb (84.8 kg)   Height: 5' 4\" (162.6 cm)     Weight  Wt Readings from Last 3 Encounters:   11/29/22 187 lb (84.8 kg)   06/30/22 189 lb (85.7 kg)   06/09/22 189 lb (85.7 kg)     Body mass index is 32.1 kg/m². 24HR INTAKE/OUTPUT:    No intake or output data in the 24 hours ending 11/29/22 0831    -----------------------------------------------------------------  Exam:    GEN:    Awake, alert and oriented x3. EYES:  EOMI, pupils equal   NECK: Supple. No lymphadenopathy. No carotid bruit  CVS:    regular rate and rhythm, 2/6 systolic murmur  PULM:  CTA, no wheezes, rales or rhonchi, no acute respiratory distress  ABD:    Bowels sounds normal.  Abdomen is soft. No distention. no tenderness to palpation. EXT:   no edema bilaterally . No calf tenderness. NEURO: Moves all extremities. Motor and sensory are grossly intact  SKIN:  No rashes.   No skin lesions.    -----------------------------------------------------------------    Diagnostic Data:      Complete Blood Count:   Recent Labs     11/27/22  0110 11/27/22  1316   WBC 6.9 6.1   RBC 4.37 4.51   HGB 13.2 13.5   HCT 39.7 40.7   MCV 90.8 90.2   MCH 30.2 29.9   MCHC 33.2 33.2   RDW 13.8 13.8    228   MPV 9.4 9.7        Last 3 Blood Glucose:   Recent Labs     11/27/22  0110 11/29/22  0545   GLUCOSE 107* 97        Comprehensive Metabolic Profile:   Recent Labs     11/27/22 0110 11/29/22  0545    141   K 3.9 4.1    107   CO2 26 23   BUN 12 10   CREATININE 0.69 0.66   GLUCOSE 107* 97   CALCIUM 9.4 8.8        Urinalysis:   Lab Results   Component Value Date/Time    NITRU NEGATIVE 11/27/2022 05:33 AM    COLORU Yellow 11/27/2022 05:33 AM    PHUR 7.0 11/27/2022 05:33 AM    WBCUA 0 TO 2 11/27/2022 05:33 AM    RBCUA 0 TO 2 11/27/2022 05:33 AM    MUCUS NOT REPORTED 01/14/2020 12:18 PM    TRICHOMONAS NOT REPORTED 01/14/2020 12:18 PM    YEAST NOT REPORTED 01/14/2020 12:18 PM    BACTERIA TRACE 11/27/2022 05:33 AM    SPECGRAV <1.005 11/27/2022 05:33 AM    LEUKOCYTESUR NEGATIVE 11/27/2022 05:33 AM    UROBILINOGEN Normal 11/27/2022 05:33 AM    BILIRUBINUR NEGATIVE 11/27/2022 05:33 AM    GLUCOSEU NEGATIVE 11/27/2022 05:33 AM    KETUA NEGATIVE 11/27/2022 05:33 AM    AMORPHOUS NOT REPORTED 01/14/2020 12:18 PM       HgBA1c:    Lab Results   Component Value Date/Time    LABA1C 5.7 11/10/2016 12:27 PM       Lactic Acid: No results found for: LACTA     Troponin: No results for input(s): TROPONINI in the last 72 hours. CRP:  No results for input(s): CRP in the last 72 hours. Radiology/Imaging:  VL DUP CAROTID BILATERAL   Final Result      CT CHEST PULMONARY EMBOLISM W CONTRAST   Final Result   No evidence of pulmonary embolism or acute pulmonary abnormality. XR CHEST (2 VW)   Final Result   No acute cardiopulmonary process. NM MYOCARDIAL SPECT REST EXERCISE OR RX    (Results Pending)         ASSESSMENT / PLAN:  Near syncope  Continue current therapy   Appreciate cardiology  Stress test-neg  Echocardiogram-normal  Stool for GI Panel-no further stools not collected  Stool for CDiff-no further stools.   Not collected  Essential hypertension  Continue Coreg  Chest pain  Appreciate cardiology  Stress test-neg  Echocardiogramneg  Continue Lipitor  Nutrition status:   obesity, non-morbid  Dietician consult initiated  Hospital Prophylaxis:   DVT: Warfarin   Stress Ulcer: PPI   High risk medications: Warfarin   Disposition:    Discharge plan is home today      CHRISTIAN Alberto - CNP , CHRISTIAN, NP-C  Hospitalist Medicine        11/29/2022, 8:31 AM

## 2022-11-29 NOTE — DISCHARGE INSTR - DIET
Good nutrition is important when healing from an illness, injury, or surgery. Follow any nutrition recommendations given to you during your hospital stay. If you were given an oral nutrition supplement while in the hospital, continue to take this supplement at home. You can take it with meals, in-between meals, and/or before bedtime. These supplements can be purchased at most local grocery stores, pharmacies, and chain [employer]-stores. If you have any questions about your diet or nutrition, call the hospital and ask for the dietitian.   Drink plenty of fluids

## 2023-07-30 ENCOUNTER — HOSPITAL ENCOUNTER (EMERGENCY)
Age: 73
Discharge: HOME | End: 2023-07-30
Payer: MEDICARE

## 2023-07-30 VITALS
TEMPERATURE: 98 F | DIASTOLIC BLOOD PRESSURE: 85 MMHG | SYSTOLIC BLOOD PRESSURE: 143 MMHG | OXYGEN SATURATION: 95 % | HEART RATE: 72 BPM | RESPIRATION RATE: 20 BRPM

## 2023-07-30 VITALS — BODY MASS INDEX: 33.5 KG/M2

## 2023-07-30 DIAGNOSIS — H11.32: Primary | ICD-10-CM

## 2023-07-30 PROCEDURE — 99281 EMR DPT VST MAYX REQ PHY/QHP: CPT

## 2023-07-30 NOTE — ED.EYEPROB1
HPI - Eye Problem
General
Chief complaint: Eye Problems
Stated complaint: EYE REDNESS
Time Seen by Provider: 07/30/23 19:02
Source: patient
Mode of arrival: walk-in
History of Present Illness
HPI Narrative: 
The patient presented to us with a subconjunctival hemorrhage that she noted over the last few hours in her left eye there was no history of pain in the lower vision or any other complaints there was no history of trauma as well

The patient just noted that subconjunctival hemorrhage while she is looking at the mirror and she denies any other complaints she also denies any constipation coughing or any strain in the last few days


Related Data
Allergies

Allergy/AdvReac Type Severity Reaction Status Date / Time
morphine AdvReac Unknown  Verified 07/30/23 18:55
blood thinner AdvReac Unknown  Uncoded 07/30/23 18:55



Review of Systems
ROS  
 Status of ROS 10 or more systems reviewed and unremarkable except as noted in history and below   

Exam
Narrative
Exam Narrative: 
Nurses notes and vital signs reviewed and patient is not hypoxic.

General:  Well-appearing and in no apparent distress.  
Skin:  Warm, dry, no pallor noted.  
No rash.
Head:  Normocephalic, atraumatic.
Neck:  Supple, non-tender.
Eye:  Pupils are equal, roundright eye examination was benign left eye examination showed that the patient have a subconjunctival hemorrhage on the left lateral aspect of the conjunctiva just lateral to the cornea there was no hyphema no hypopyon no 
signs of trauma to the eye
Ears, Nose, Mouth, and Throat:  TM are clear, no nasal mucosal hypertrophy.  Oral mucosa is moist, no posterior oropharynx erythema, uvula is mid-line
Cardiovascular:  Regular Rate and Rhythm without murmur, gallop or rub.
Respiratory:  No accessory muscle use or respiratory distress.
Lungs are clear to auscultation, no wheezing, rales or rhonchi
Chest Wall:  no tenderness
Back: No midline thoracic or lumbar vertebral tenderness. No CVA tenderness
Musculoskeletal:  normal ROM, no calf or popliteal tenderness, no lower extremity edema/swelling
GI:  Abdomen is soft, non-distended.  Normal bowel sounds.
No masses appreciated.
No tenderness to palpation. No rebound, guarding, or rigidity noted.
Neurological:  A&O x4.  No cranial nerve dysfunction observed.  No truncal ataxia. Moves all extremities. Sensation intact.
Psychiatric:  Cooperative and interactive. Normal mood and affect.
Constitutional
Vital Signs, click to edit/add: 

Last Vital Signs

Temp  98.0 F   07/30/23 18:50
Pulse  72   07/30/23 18:50
Resp  20   07/30/23 18:50
BP  143/85 H  07/30/23 18:50
Pulse Ox  95   07/30/23 18:50
O2 Del Method  Room Air  07/30/23 18:50




Course
Vital Signs
Vital signs: 

Vital Signs

Temperature  98.0 F   07/30/23 18:50
Pulse Rate  72   07/30/23 18:50
Respiratory Rate  20   07/30/23 18:50
Blood Pressure  143/85 H  07/30/23 18:50
Pulse Oximetry  95   07/30/23 18:50
Oxygen Delivery Method  Room Air  07/30/23 18:50



Temperature  98.0 F   07/30/23 18:50
Pulse Rate  72   07/30/23 18:50
Respiratory Rate  20   07/30/23 18:50
Blood Pressure  143/85 H  07/30/23 18:50
Pulse Oximetry  95   07/30/23 18:50
Oxygen Delivery Method  Room Air  07/30/23 18:50




MDM - Eye Problem
MDM Narrative
Medical decision making narrative: 
The patient does take Coumadin and her last INR was 2.5 right now the patient was instructed about monitoring her symptoms and there is no more treatment needed for subconjunctival hemorrhage at the moment but she was advised about the alarming 
symptoms of pain or headache or any blurry vision to bring her back to the ER

The patient is to follow up with primary care physician in next 2-3 days or to return to the emergency department should any of the signs or symptoms worsen or new symptoms develop. The patient agrees with the following Diagnosis and Treatment plan 
and the patient will be discharged home.


Discharge Plan
Discharge
Chief Complaint: Eye Problems

Clinical Impression:
 Non-traumatic subconjunctival hemorrhage


Patient Disposition: Home, Self-Care

Time of Disposition Decision: 19:11

Condition: Good

Mode of Transportation: Private Vehicle

Instructions:  Subconjunctival Hemorrhage (ED)

Stand Alone Forms:  Portal Instructions

Referrals:
BOUCHRA ORTIZ [Primary Care Provider] - 1 week

## 2023-12-14 ENCOUNTER — APPOINTMENT (OUTPATIENT)
Dept: CT IMAGING | Age: 73
End: 2023-12-14
Payer: MEDICARE

## 2023-12-14 ENCOUNTER — HOSPITAL ENCOUNTER (EMERGENCY)
Age: 73
Discharge: HOME | End: 2023-12-15
Attending: EMERGENCY MEDICINE
Payer: MEDICARE

## 2023-12-14 VITALS
OXYGEN SATURATION: 94 % | DIASTOLIC BLOOD PRESSURE: 58 MMHG | RESPIRATION RATE: 14 BRPM | HEART RATE: 75 BPM | SYSTOLIC BLOOD PRESSURE: 122 MMHG | TEMPERATURE: 97.7 F

## 2023-12-14 DIAGNOSIS — S09.90XA CLOSED HEAD INJURY, INITIAL ENCOUNTER: ICD-10-CM

## 2023-12-14 DIAGNOSIS — Z87.19 HISTORY OF CROHN'S DISEASE: ICD-10-CM

## 2023-12-14 DIAGNOSIS — R19.7 DIARRHEA, UNSPECIFIED TYPE: Primary | ICD-10-CM

## 2023-12-14 DIAGNOSIS — R07.89 CHEST WALL PAIN: ICD-10-CM

## 2023-12-14 LAB
ALBUMIN SERPL-MCNC: 4.5 G/DL (ref 3.5–5.2)
ALBUMIN/GLOB SERPL: 1.7 {RATIO} (ref 1–2.5)
ALP SERPL-CCNC: 83 U/L (ref 35–104)
ALT SERPL-CCNC: 14 U/L (ref 5–33)
ANION GAP SERPL CALCULATED.3IONS-SCNC: 13 MMOL/L (ref 9–17)
AST SERPL-CCNC: 23 U/L
BASOPHILS # BLD: 0.04 K/UL (ref 0–0.2)
BASOPHILS NFR BLD: 0 % (ref 0–2)
BILIRUB SERPL-MCNC: 0.5 MG/DL (ref 0.3–1.2)
BILIRUB UR QL STRIP: NEGATIVE
BUN SERPL-MCNC: 11 MG/DL (ref 8–23)
BUN/CREAT SERPL: 22 (ref 9–20)
CALCIUM SERPL-MCNC: 8.7 MG/DL (ref 8.6–10.4)
CHLORIDE SERPL-SCNC: 100 MMOL/L (ref 98–107)
CLARITY UR: CLEAR
CO2 SERPL-SCNC: 22 MMOL/L (ref 20–31)
COLOR UR: YELLOW
CREAT SERPL-MCNC: 0.5 MG/DL (ref 0.5–0.9)
EOSINOPHIL # BLD: 0.16 K/UL (ref 0–0.44)
EOSINOPHILS RELATIVE PERCENT: 2 % (ref 1–4)
EPI CELLS #/AREA URNS HPF: NORMAL /HPF (ref 0–25)
ERYTHROCYTE [DISTWIDTH] IN BLOOD BY AUTOMATED COUNT: 14 % (ref 11.8–14.4)
GFR SERPL CREATININE-BSD FRML MDRD: >60 ML/MIN/1.73M2
GLUCOSE SERPL-MCNC: 138 MG/DL (ref 70–99)
GLUCOSE UR STRIP-MCNC: NEGATIVE MG/DL
HCT VFR BLD AUTO: 43.9 % (ref 36.3–47.1)
HGB BLD-MCNC: 14.2 G/DL (ref 11.9–15.1)
HGB UR QL STRIP.AUTO: ABNORMAL
IMM GRANULOCYTES # BLD AUTO: 0.03 K/UL (ref 0–0.3)
IMM GRANULOCYTES NFR BLD: 0 %
INR PPP: 2.1
KETONES UR STRIP-MCNC: NEGATIVE MG/DL
LACTATE BLDV-SCNC: 1.4 MMOL/L (ref 0.5–2.2)
LEUKOCYTE ESTERASE UR QL STRIP: NEGATIVE
LIPASE SERPL-CCNC: 19 U/L (ref 13–60)
LYMPHOCYTES NFR BLD: 0.65 K/UL (ref 1.1–3.7)
LYMPHOCYTES RELATIVE PERCENT: 6 % (ref 24–43)
MCH RBC QN AUTO: 29.3 PG (ref 25.2–33.5)
MCHC RBC AUTO-ENTMCNC: 32.3 G/DL (ref 28.4–34.8)
MCV RBC AUTO: 90.7 FL (ref 82.6–102.9)
MONOCYTES NFR BLD: 0.43 K/UL (ref 0.1–1.2)
MONOCYTES NFR BLD: 4 % (ref 3–12)
NEUTROPHILS NFR BLD: 88 % (ref 36–65)
NEUTS SEG NFR BLD: 9.28 K/UL (ref 1.5–8.1)
NITRITE UR QL STRIP: NEGATIVE
NRBC BLD-RTO: 0 PER 100 WBC
PH UR STRIP: 6 [PH] (ref 5–9)
PLATELET # BLD AUTO: 229 K/UL (ref 138–453)
PMV BLD AUTO: 9.3 FL (ref 8.1–13.5)
POTASSIUM SERPL-SCNC: 3.4 MMOL/L (ref 3.7–5.3)
PROT SERPL-MCNC: 7.2 G/DL (ref 6.4–8.3)
PROT UR STRIP-MCNC: NEGATIVE MG/DL
PROTHROMBIN TIME: 23.3 SEC (ref 11.9–14.8)
RBC # BLD AUTO: 4.84 M/UL (ref 3.95–5.11)
RBC #/AREA URNS HPF: NORMAL /HPF (ref 0–2)
SODIUM SERPL-SCNC: 135 MMOL/L (ref 135–144)
SP GR UR STRIP: 1.02 (ref 1.01–1.02)
TROPONIN I SERPL HS-MCNC: 6 NG/L (ref 0–14)
UROBILINOGEN UR STRIP-ACNC: NORMAL EU/DL (ref 0–1)
WBC #/AREA URNS HPF: NORMAL /HPF (ref 0–5)
WBC OTHER # BLD: 10.6 K/UL (ref 3.5–11.3)

## 2023-12-14 PROCEDURE — 74177 CT ABD & PELVIS W/CONTRAST: CPT

## 2023-12-14 PROCEDURE — 84484 ASSAY OF TROPONIN QUANT: CPT

## 2023-12-14 PROCEDURE — 96374 THER/PROPH/DIAG INJ IV PUSH: CPT

## 2023-12-14 PROCEDURE — 70450 CT HEAD/BRAIN W/O DYE: CPT

## 2023-12-14 PROCEDURE — 85025 COMPLETE CBC W/AUTO DIFF WBC: CPT

## 2023-12-14 PROCEDURE — 36415 COLL VENOUS BLD VENIPUNCTURE: CPT

## 2023-12-14 PROCEDURE — 99285 EMERGENCY DEPT VISIT HI MDM: CPT

## 2023-12-14 PROCEDURE — 72125 CT NECK SPINE W/O DYE: CPT

## 2023-12-14 PROCEDURE — 85610 PROTHROMBIN TIME: CPT

## 2023-12-14 PROCEDURE — 83690 ASSAY OF LIPASE: CPT

## 2023-12-14 PROCEDURE — 80053 COMPREHEN METABOLIC PANEL: CPT

## 2023-12-14 PROCEDURE — 6360000004 HC RX CONTRAST MEDICATION: Performed by: EMERGENCY MEDICINE

## 2023-12-14 PROCEDURE — 81001 URINALYSIS AUTO W/SCOPE: CPT

## 2023-12-14 PROCEDURE — 83605 ASSAY OF LACTIC ACID: CPT

## 2023-12-14 PROCEDURE — 96372 THER/PROPH/DIAG INJ SC/IM: CPT

## 2023-12-14 PROCEDURE — 2580000003 HC RX 258: Performed by: EMERGENCY MEDICINE

## 2023-12-14 PROCEDURE — 96361 HYDRATE IV INFUSION ADD-ON: CPT

## 2023-12-14 PROCEDURE — 93005 ELECTROCARDIOGRAM TRACING: CPT | Performed by: EMERGENCY MEDICINE

## 2023-12-14 PROCEDURE — 6360000002 HC RX W HCPCS: Performed by: EMERGENCY MEDICINE

## 2023-12-14 PROCEDURE — 6370000000 HC RX 637 (ALT 250 FOR IP): Performed by: EMERGENCY MEDICINE

## 2023-12-14 RX ORDER — DICYCLOMINE HYDROCHLORIDE 10 MG/ML
20 INJECTION INTRAMUSCULAR ONCE
Status: COMPLETED | OUTPATIENT
Start: 2023-12-14 | End: 2023-12-14

## 2023-12-14 RX ORDER — 0.9 % SODIUM CHLORIDE 0.9 %
1000 INTRAVENOUS SOLUTION INTRAVENOUS ONCE
Status: COMPLETED | OUTPATIENT
Start: 2023-12-14 | End: 2023-12-15

## 2023-12-14 RX ORDER — POTASSIUM CHLORIDE 20 MEQ/1
40 TABLET, EXTENDED RELEASE ORAL ONCE
Status: COMPLETED | OUTPATIENT
Start: 2023-12-14 | End: 2023-12-14

## 2023-12-14 RX ORDER — ACETAMINOPHEN 500 MG
1000 TABLET ORAL ONCE
Status: COMPLETED | OUTPATIENT
Start: 2023-12-14 | End: 2023-12-14

## 2023-12-14 RX ADMIN — ACETAMINOPHEN 1000 MG: 500 TABLET ORAL at 22:18

## 2023-12-14 RX ADMIN — SODIUM CHLORIDE 1000 ML: 9 INJECTION, SOLUTION INTRAVENOUS at 22:07

## 2023-12-14 RX ADMIN — POTASSIUM CHLORIDE 40 MEQ: 1500 TABLET, EXTENDED RELEASE ORAL at 23:04

## 2023-12-14 RX ADMIN — DICYCLOMINE HYDROCHLORIDE 20 MG: 10 INJECTION, SOLUTION INTRAMUSCULAR at 22:18

## 2023-12-14 RX ADMIN — IOPAMIDOL 75 ML: 755 INJECTION, SOLUTION INTRAVENOUS at 22:43

## 2023-12-14 ASSESSMENT — PAIN DESCRIPTION - DESCRIPTORS: DESCRIPTORS: ACHING

## 2023-12-14 ASSESSMENT — PAIN - FUNCTIONAL ASSESSMENT: PAIN_FUNCTIONAL_ASSESSMENT: 0-10

## 2023-12-14 ASSESSMENT — PAIN SCALES - GENERAL
PAINLEVEL_OUTOF10: 9
PAINLEVEL_OUTOF10: 7

## 2023-12-14 ASSESSMENT — PAIN DESCRIPTION - LOCATION: LOCATION: ABDOMEN

## 2023-12-14 ASSESSMENT — PAIN DESCRIPTION - FREQUENCY: FREQUENCY: CONTINUOUS

## 2023-12-15 LAB
EKG ATRIAL RATE: 78 BPM
EKG P AXIS: 42 DEGREES
EKG P-R INTERVAL: 174 MS
EKG Q-T INTERVAL: 394 MS
EKG QRS DURATION: 76 MS
EKG QTC CALCULATION (BAZETT): 449 MS
EKG R AXIS: 13 DEGREES
EKG T AXIS: 45 DEGREES
EKG VENTRICULAR RATE: 78 BPM

## 2023-12-15 PROCEDURE — 93010 ELECTROCARDIOGRAM REPORT: CPT | Performed by: FAMILY MEDICINE

## 2023-12-15 PROCEDURE — 6360000002 HC RX W HCPCS: Performed by: EMERGENCY MEDICINE

## 2023-12-15 RX ORDER — PREDNISONE 50 MG/1
TABLET ORAL
Qty: 5 TABLET | Refills: 0 | Status: SHIPPED | OUTPATIENT
Start: 2023-12-15 | End: 2023-12-15 | Stop reason: SDUPTHER

## 2023-12-15 RX ORDER — METHYLPREDNISOLONE SODIUM SUCCINATE 125 MG/2ML
125 INJECTION, POWDER, LYOPHILIZED, FOR SOLUTION INTRAMUSCULAR; INTRAVENOUS ONCE
Status: COMPLETED | OUTPATIENT
Start: 2023-12-15 | End: 2023-12-15

## 2023-12-15 RX ORDER — PREDNISONE 50 MG/1
TABLET ORAL
Qty: 5 TABLET | Refills: 0 | Status: SHIPPED | OUTPATIENT
Start: 2023-12-15

## 2023-12-15 RX ORDER — LOPERAMIDE HYDROCHLORIDE 2 MG/1
2 CAPSULE ORAL 4 TIMES DAILY PRN
Qty: 20 CAPSULE | Refills: 0 | Status: SHIPPED | OUTPATIENT
Start: 2023-12-15 | End: 2023-12-25

## 2023-12-15 RX ADMIN — METHYLPREDNISOLONE SODIUM SUCCINATE 125 MG: 125 INJECTION INTRAMUSCULAR; INTRAVENOUS at 00:52

## 2023-12-15 NOTE — ED PROVIDER NOTES
8.3 g/dL    Albumin 4.5 3.5 - 5.2 g/dL    Albumin/Globulin Ratio 1.7 1.0 - 2.5    Total Bilirubin 0.5 0.3 - 1.2 mg/dL    Alkaline Phosphatase 83 35 - 104 U/L    ALT 14 5 - 33 U/L    AST 23 <32 U/L   Lipase   Result Value Ref Range    Lipase 19 13 - 60 U/L   Lactic Acid   Result Value Ref Range    Lactic Acid 1.4 0.5 - 2.2 mmol/L   Troponin   Result Value Ref Range    Troponin, High Sensitivity 6 0 - 14 ng/L   Urinalysis   Result Value Ref Range    Color, UA Yellow Yellow    Turbidity UA Clear Clear    Glucose, Ur NEGATIVE NEGATIVE mg/dL    Bilirubin Urine NEGATIVE NEGATIVE    Ketones, Urine NEGATIVE NEGATIVE mg/dL    Specific Gravity, UA 1.025 (H) 1.010 - 1.020    Urine Hgb 1+ (A) NEGATIVE    pH, UA 6.0 5.0 - 9.0    Protein, UA NEGATIVE NEGATIVE mg/dL    Urobilinogen, Urine Normal 0.0 - 1.0 EU/dL    Nitrite, Urine NEGATIVE NEGATIVE    Leukocyte Esterase, Urine NEGATIVE NEGATIVE   Protime-INR   Result Value Ref Range    Protime 23.3 (H) 11.9 - 14.8 sec    INR 2.1    Microscopic Urinalysis   Result Value Ref Range    WBC, UA 0 TO 2 0 - 5 /HPF    RBC, UA 2 TO 5 0 - 2 /HPF    Epithelial Cells UA 5 TO 10 0 - 25 /HPF   EKG 12 Lead   Result Value Ref Range    Ventricular Rate 78 BPM    Atrial Rate 78 BPM    P-R Interval 174 ms    QRS Duration 76 ms    Q-T Interval 394 ms    QTc Calculation (Bazett) 449 ms    P Axis 42 degrees    R Axis 13 degrees    T Axis 45 degrees       RADIOLOGY:  Interpreted by Radiologist.  CT ABDOMEN PELVIS W IV CONTRAST Additional Contrast? None   Final Result   Tiny hiatal hernia. Small and large bowel are extensively fluid-filled with much of the small   bowel near top-normal distension. Suspected areas of mild small bowel mural   thickening but no suggestion of surrounding inflammatory change as would be   expected with acute flare up. Evidence of old granulomatous disease. Status post hysterectomy and instrumented lumbar fusion/decompression.          CT CSpine W/O Contrast

## 2024-01-11 DIAGNOSIS — M81.0 AGE-RELATED OSTEOPOROSIS WITHOUT CURRENT PATHOLOGICAL FRACTURE: Primary | ICD-10-CM

## 2024-01-11 RX ORDER — DIPHENHYDRAMINE HYDROCHLORIDE 50 MG/ML
50 INJECTION INTRAMUSCULAR; INTRAVENOUS
OUTPATIENT
Start: 2024-01-11

## 2024-01-11 RX ORDER — ONDANSETRON 2 MG/ML
8 INJECTION INTRAMUSCULAR; INTRAVENOUS
OUTPATIENT
Start: 2024-01-11

## 2024-01-11 RX ORDER — EPINEPHRINE 1 MG/ML
0.3 INJECTION, SOLUTION, CONCENTRATE INTRAVENOUS PRN
OUTPATIENT
Start: 2024-01-11

## 2024-01-11 RX ORDER — ALBUTEROL SULFATE 90 UG/1
4 AEROSOL, METERED RESPIRATORY (INHALATION) PRN
OUTPATIENT
Start: 2024-01-11

## 2024-01-11 RX ORDER — SODIUM CHLORIDE 9 MG/ML
INJECTION, SOLUTION INTRAVENOUS CONTINUOUS
OUTPATIENT
Start: 2024-01-11

## 2024-01-11 RX ORDER — ACETAMINOPHEN 325 MG/1
650 TABLET ORAL
OUTPATIENT
Start: 2024-01-11

## 2024-03-05 ENCOUNTER — HOSPITAL ENCOUNTER (OUTPATIENT)
Dept: INFUSION THERAPY | Age: 74
End: 2024-03-05

## 2024-04-05 ENCOUNTER — HOSPITAL ENCOUNTER (EMERGENCY)
Age: 74
Discharge: HOME | End: 2024-04-05
Attending: STUDENT IN AN ORGANIZED HEALTH CARE EDUCATION/TRAINING PROGRAM
Payer: MEDICARE

## 2024-04-05 ENCOUNTER — APPOINTMENT (OUTPATIENT)
Dept: GENERAL RADIOLOGY | Age: 74
End: 2024-04-05
Payer: MEDICARE

## 2024-04-05 ENCOUNTER — APPOINTMENT (OUTPATIENT)
Dept: CT IMAGING | Age: 74
End: 2024-04-05
Payer: MEDICARE

## 2024-04-05 VITALS
DIASTOLIC BLOOD PRESSURE: 88 MMHG | TEMPERATURE: 98.1 F | RESPIRATION RATE: 16 BRPM | HEART RATE: 75 BPM | SYSTOLIC BLOOD PRESSURE: 171 MMHG | OXYGEN SATURATION: 96 %
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
## 2024-04-05 LAB
ALBUMIN SERPL-MCNC: 4.1 G/DL (ref 3.5–5.2)
ALBUMIN/GLOB SERPL: 1.4 {RATIO} (ref 1–2.5)
ALP SERPL-CCNC: 84 U/L (ref 35–104)
ALT SERPL-CCNC: 21 U/L (ref 5–33)
ANION GAP SERPL CALCULATED.3IONS-SCNC: 9 MMOL/L (ref 9–17)
AST SERPL-CCNC: 23 U/L
BASOPHILS # BLD: 0.04 K/UL (ref 0–0.2)
BASOPHILS NFR BLD: 1 % (ref 0–2)
BILIRUB SERPL-MCNC: 0.3 MG/DL (ref 0.3–1.2)
BUN SERPL-MCNC: 10 MG/DL (ref 8–23)
BUN/CREAT SERPL: 17 (ref 9–20)
CALCIUM SERPL-MCNC: 9.2 MG/DL (ref 8.6–10.4)
CHLORIDE SERPL-SCNC: 106 MMOL/L (ref 98–107)
CO2 SERPL-SCNC: 25 MMOL/L (ref 20–31)
CREAT SERPL-MCNC: 0.6 MG/DL (ref 0.5–0.9)
EOSINOPHIL # BLD: 0.23 K/UL (ref 0–0.44)
EOSINOPHILS RELATIVE PERCENT: 4 % (ref 1–4)
ERYTHROCYTE [DISTWIDTH] IN BLOOD BY AUTOMATED COUNT: 13.8 % (ref 11.8–14.4)
GFR SERPL CREATININE-BSD FRML MDRD: >90 ML/MIN/1.73M2
GLUCOSE SERPL-MCNC: 97 MG/DL (ref 70–99)
HCT VFR BLD AUTO: 39.2 % (ref 36.3–47.1)
HGB BLD-MCNC: 13 G/DL (ref 11.9–15.1)
IMM GRANULOCYTES # BLD AUTO: <0.03 K/UL (ref 0–0.3)
IMM GRANULOCYTES NFR BLD: 0 %
INR PPP: 2
LYMPHOCYTES NFR BLD: 2.04 K/UL (ref 1.1–3.7)
LYMPHOCYTES RELATIVE PERCENT: 32 % (ref 24–43)
MCH RBC QN AUTO: 30 PG (ref 25.2–33.5)
MCHC RBC AUTO-ENTMCNC: 33.2 G/DL (ref 28.4–34.8)
MCV RBC AUTO: 90.3 FL (ref 82.6–102.9)
MONOCYTES NFR BLD: 0.54 K/UL (ref 0.1–1.2)
MONOCYTES NFR BLD: 8 % (ref 3–12)
NEUTROPHILS NFR BLD: 55 % (ref 36–65)
NEUTS SEG NFR BLD: 3.55 K/UL (ref 1.5–8.1)
NRBC BLD-RTO: 0 PER 100 WBC
PLATELET # BLD AUTO: 227 K/UL (ref 138–453)
PMV BLD AUTO: 9.5 FL (ref 8.1–13.5)
POTASSIUM SERPL-SCNC: 4 MMOL/L (ref 3.7–5.3)
PROT SERPL-MCNC: 7 G/DL (ref 6.4–8.3)
PROTHROMBIN TIME: 21.7 SEC (ref 11.7–14.1)
RBC # BLD AUTO: 4.34 M/UL (ref 3.95–5.11)
SODIUM SERPL-SCNC: 140 MMOL/L (ref 135–144)
WBC OTHER # BLD: 6.4 K/UL (ref 3.5–11.3)

## 2024-04-05 PROCEDURE — 99284 EMERGENCY DEPT VISIT MOD MDM: CPT

## 2024-04-05 PROCEDURE — 6360000002 HC RX W HCPCS: Performed by: STUDENT IN AN ORGANIZED HEALTH CARE EDUCATION/TRAINING PROGRAM

## 2024-04-05 PROCEDURE — 73030 X-RAY EXAM OF SHOULDER: CPT

## 2024-04-05 PROCEDURE — 80053 COMPREHEN METABOLIC PANEL: CPT

## 2024-04-05 PROCEDURE — 96374 THER/PROPH/DIAG INJ IV PUSH: CPT

## 2024-04-05 PROCEDURE — 73590 X-RAY EXAM OF LOWER LEG: CPT

## 2024-04-05 PROCEDURE — 70450 CT HEAD/BRAIN W/O DYE: CPT

## 2024-04-05 PROCEDURE — 85025 COMPLETE CBC W/AUTO DIFF WBC: CPT

## 2024-04-05 PROCEDURE — 72192 CT PELVIS W/O DYE: CPT

## 2024-04-05 PROCEDURE — 73562 X-RAY EXAM OF KNEE 3: CPT

## 2024-04-05 PROCEDURE — 6370000000 HC RX 637 (ALT 250 FOR IP): Performed by: STUDENT IN AN ORGANIZED HEALTH CARE EDUCATION/TRAINING PROGRAM

## 2024-04-05 PROCEDURE — 72125 CT NECK SPINE W/O DYE: CPT

## 2024-04-05 PROCEDURE — 36415 COLL VENOUS BLD VENIPUNCTURE: CPT

## 2024-04-05 PROCEDURE — 71046 X-RAY EXAM CHEST 2 VIEWS: CPT

## 2024-04-05 PROCEDURE — 85610 PROTHROMBIN TIME: CPT

## 2024-04-05 RX ORDER — LIDOCAINE 4 G/G
1 PATCH TOPICAL ONCE
Status: DISCONTINUED | OUTPATIENT
Start: 2024-04-05 | End: 2024-04-05 | Stop reason: HOSPADM

## 2024-04-05 RX ORDER — LIDOCAINE 50 MG/G
OINTMENT TOPICAL
Qty: 50 G | Refills: 0 | Status: SHIPPED | OUTPATIENT
Start: 2024-04-05

## 2024-04-05 RX ORDER — FENTANYL CITRATE 50 UG/ML
50 INJECTION, SOLUTION INTRAMUSCULAR; INTRAVENOUS ONCE
Status: COMPLETED | OUTPATIENT
Start: 2024-04-05 | End: 2024-04-05

## 2024-04-05 RX ADMIN — FENTANYL CITRATE 50 MCG: 50 INJECTION INTRAMUSCULAR; INTRAVENOUS at 15:33

## 2024-04-05 ASSESSMENT — PAIN DESCRIPTION - LOCATION: LOCATION: ARM;HIP;RIB CAGE

## 2024-04-05 ASSESSMENT — ENCOUNTER SYMPTOMS
COLOR CHANGE: 1
SHORTNESS OF BREATH: 0

## 2024-04-05 ASSESSMENT — LIFESTYLE VARIABLES
HOW MANY STANDARD DRINKS CONTAINING ALCOHOL DO YOU HAVE ON A TYPICAL DAY: PATIENT DOES NOT DRINK
HOW OFTEN DO YOU HAVE A DRINK CONTAINING ALCOHOL: NEVER

## 2024-04-05 ASSESSMENT — PAIN SCALES - GENERAL
PAINLEVEL_OUTOF10: 7
PAINLEVEL_OUTOF10: 10

## 2024-04-05 ASSESSMENT — PAIN DESCRIPTION - DESCRIPTORS
DESCRIPTORS: ACHING
DESCRIPTORS: DISCOMFORT

## 2024-04-05 ASSESSMENT — PAIN - FUNCTIONAL ASSESSMENT: PAIN_FUNCTIONAL_ASSESSMENT: 0-10

## 2024-04-05 NOTE — ED PROVIDER NOTES
[facility] ED  Emergency Department Encounter  Emergency Medicine Attending     Pt Name:Rupal Roach  MRN: [id number]  Birthdate 1950  Date of evaluation: 4/5/24  PCP:  Kulwinder Schrader  Note Started: 3:14 PM EDT      CHIEF COMPLAINT       Chief Complaint   Patient presents with    Fall     Pt reports fall 2 days ago. Slipped off of walker seat & fell onto left side. Pt. Denies any LOC but unsure if she hit her head. Pt. States Left hip, left arm, & left rib pain. Pt. Takes coumadin at home. Pt.reports taking 2 percocet at home @ 1200       HISTORY OF PRESENT ILLNESS  (Location/Symptom, Timing/Onset, Context/Setting, Quality, Duration, Modifying Factors, Severity.)      Rupal Roach is a 74 y.o. female who presents with a fall.  Following 2 days ago, patient stated attempting to sit on her rolling walker when she forgot to lock it and smell from under her.  Patient states she fell mainly on her left side.  Patient is unsure if she struck her head.  Denies loss of consciousness.  Does take Coumadin.  States she has been ambulating since however having significant pain over her left hip.  Patient states she had a previous complicated surgery on her left hip that required a muscle reattachment.  Patient is afraid that the muscle has come off her hip again due to the pain she is feeling.  Took pain pills prior to arrival here with no improvement in her symptoms.  States her left leg at the hip does feel weak, having trouble flexing her hip up.  Patient states her leg surgery and previous spine surgeries have been done at [facility] with Dr. Hu.    PAST MEDICAL / SURGICAL / SOCIAL / FAMILY HISTORY      has a past medical history of Anxiety, Arthritis, Asthma, Blood circulation, collateral, CAD (coronary artery disease), Chronic back pain, Depression, Difficult intravenous access, Disease of blood and blood forming organ, Diverticulitis, DVT (deep venous thrombosis) (HCC), Dyslexia,

## 2024-11-11 DIAGNOSIS — M81.0 AGE-RELATED OSTEOPOROSIS WITHOUT CURRENT PATHOLOGICAL FRACTURE: Primary | ICD-10-CM

## 2024-11-18 ENCOUNTER — HOSPITAL ENCOUNTER (OUTPATIENT)
Dept: INFUSION THERAPY | Age: 74
Discharge: HOME | End: 2024-11-18
Payer: MEDICARE

## 2024-11-18 VITALS
RESPIRATION RATE: 18 BRPM | HEART RATE: 75 BPM | SYSTOLIC BLOOD PRESSURE: 131 MMHG | DIASTOLIC BLOOD PRESSURE: 79 MMHG | TEMPERATURE: 97.9 F

## 2024-11-18 DIAGNOSIS — M81.0 AGE-RELATED OSTEOPOROSIS WITHOUT CURRENT PATHOLOGICAL FRACTURE: Primary | ICD-10-CM

## 2024-11-18 PROCEDURE — 96372 THER/PROPH/DIAG INJ SC/IM: CPT

## 2024-11-18 PROCEDURE — 6360000002 HC RX W HCPCS: Performed by: INTERNAL MEDICINE

## 2024-11-18 RX ORDER — ACETAMINOPHEN 325 MG/1
650 TABLET ORAL
OUTPATIENT
Start: 2025-05-18

## 2024-11-18 RX ORDER — DIPHENHYDRAMINE HYDROCHLORIDE 50 MG/ML
50 INJECTION INTRAMUSCULAR; INTRAVENOUS
OUTPATIENT
Start: 2025-05-18

## 2024-11-18 RX ORDER — ONDANSETRON 2 MG/ML
8 INJECTION INTRAMUSCULAR; INTRAVENOUS
OUTPATIENT
Start: 2025-05-18

## 2024-11-18 RX ORDER — SODIUM CHLORIDE 9 MG/ML
INJECTION, SOLUTION INTRAVENOUS CONTINUOUS
OUTPATIENT
Start: 2025-05-18

## 2024-11-18 RX ORDER — ALBUTEROL SULFATE 90 UG/1
4 INHALANT RESPIRATORY (INHALATION) PRN
OUTPATIENT
Start: 2025-05-18

## 2024-11-18 RX ORDER — EPINEPHRINE 1 MG/ML
0.3 INJECTION, SOLUTION, CONCENTRATE INTRAVENOUS PRN
OUTPATIENT
Start: 2025-05-18

## 2024-11-18 RX ORDER — HYDROCORTISONE SODIUM SUCCINATE 100 MG/2ML
100 INJECTION INTRAMUSCULAR; INTRAVENOUS
OUTPATIENT
Start: 2025-05-18

## 2024-11-18 RX ADMIN — DENOSUMAB 60 MG: 60 INJECTION SUBCUTANEOUS at 11:32

## 2024-11-18 NOTE — DISCHARGE INSTRUCTIONS
Outpatient Discharge Instructions for Prolia Injections    [address] [address]   [phone]      You are advised to carry out the following Instructions:    Diet:  As prescribed by your Physician.    Activity:  As prescribed by your Physician.      Care of injection site:  If the injection site becomes red,sore,swollen,painful, has drainage,or you develop a fever,notify your Physician.      Other:    If you develop hives,rash,itching or trouble breathing, go to the nearest Emergency Room. These could be a sign of allergic reaction.  Continue to take your calcium and vitamin D.  Let your dentist know that you are taking Prolia.  Let your doctor know if you have any unusual jaw or leg bone pain.  Take good care of your teeth,see a dentist often.   Injection is every 6 months.      Follow up appointment:          ANY PROBLEMS OR CONCERNS NOTIFY YOUR PHYSICIAN   OR    GO THE NEAREST EMERGENCY ROOM

## 2025-05-19 ENCOUNTER — APPOINTMENT (OUTPATIENT)
Dept: NUCLEAR MEDICINE | Age: 75
End: 2025-05-19
Payer: MEDICARE

## 2025-05-19 ENCOUNTER — APPOINTMENT (OUTPATIENT)
Dept: CT IMAGING | Age: 75
End: 2025-05-19
Payer: MEDICARE

## 2025-05-19 ENCOUNTER — APPOINTMENT (OUTPATIENT)
Age: 75
End: 2025-05-19
Payer: MEDICARE

## 2025-05-19 ENCOUNTER — APPOINTMENT (OUTPATIENT)
Dept: GENERAL RADIOLOGY | Age: 75
End: 2025-05-19
Payer: MEDICARE

## 2025-05-19 ENCOUNTER — HOSPITAL ENCOUNTER (OUTPATIENT)
Dept: INFUSION THERAPY | Age: 75
End: 2025-05-19

## 2025-05-19 ENCOUNTER — HOSPITAL ENCOUNTER (OUTPATIENT)
Age: 75
Setting detail: OBSERVATION
Discharge: HOME | End: 2025-05-20
Attending: STUDENT IN AN ORGANIZED HEALTH CARE EDUCATION/TRAINING PROGRAM | Admitting: INTERNAL MEDICINE
Payer: MEDICARE

## 2025-05-19 DIAGNOSIS — R07.9 CHEST PAIN, UNSPECIFIED TYPE: Primary | ICD-10-CM

## 2025-05-19 PROBLEM — J06.9 URI (UPPER RESPIRATORY INFECTION): Status: ACTIVE | Noted: 2025-05-19

## 2025-05-19 LAB
ALBUMIN SERPL-MCNC: 4.2 G/DL (ref 3.5–5.2)
ALBUMIN/GLOB SERPL: 1.4 {RATIO} (ref 1–2.5)
ALP SERPL-CCNC: 83 U/L (ref 35–104)
ALT SERPL-CCNC: 14 U/L (ref 10–35)
ANION GAP SERPL CALCULATED.3IONS-SCNC: 11 MMOL/L (ref 9–16)
AST SERPL-CCNC: 30 U/L (ref 10–35)
BASOPHILS # BLD: 0.05 K/UL (ref 0–0.2)
BASOPHILS NFR BLD: 1 % (ref 0–2)
BILIRUB SERPL-MCNC: 0.3 MG/DL (ref 0–1.2)
BNP SERPL-MCNC: 161 PG/ML (ref 0–125)
BUN SERPL-MCNC: 12 MG/DL (ref 8–23)
BUN/CREAT SERPL: 17 (ref 9–20)
CALCIUM SERPL-MCNC: 9.7 MG/DL (ref 8.6–10.4)
CHLORIDE SERPL-SCNC: 105 MMOL/L (ref 98–107)
CHOLEST SERPL-MCNC: 136 MG/DL (ref 0–199)
CHOLESTEROL/HDL RATIO: 3.5
CO2 SERPL-SCNC: 24 MMOL/L (ref 20–31)
CREAT SERPL-MCNC: 0.7 MG/DL (ref 0.5–0.9)
ECHO AO SINUS VALSALVA DIAM: 3.1 CM
ECHO AO SINUS VALSALVA INDEX: 1.64 CM/M2
ECHO AO ST JNCT DIAM: 2.6 CM
ECHO AV CUSP MM: 1.6 CM
ECHO AV MEAN GRADIENT: 6 MMHG
ECHO AV MEAN VELOCITY: 1.1 M/S
ECHO AV PEAK GRADIENT: 10 MMHG
ECHO AV PEAK VELOCITY: 1.6 M/S
ECHO AV VELOCITY RATIO: 0.75
ECHO AV VTI: 31.7 CM
ECHO BSA: 1.96 M2
ECHO BSA: 1.97 M2
ECHO EST RA PRESSURE: 3 MMHG
ECHO LA AREA 2C: 17.2 CM2
ECHO LA AREA 4C: 17.3 CM2
ECHO LA MAJOR AXIS: 4.9 CM
ECHO LA MINOR AXIS: 5.2 CM
ECHO LA VOL BP: 46 ML (ref 22–52)
ECHO LA VOL MOD A2C: 46 ML (ref 22–52)
ECHO LA VOL MOD A4C: 44 ML (ref 22–52)
ECHO LA VOL/BSA BIPLANE: 24 ML/M2 (ref 16–34)
ECHO LA VOLUME INDEX MOD A2C: 24 ML/M2 (ref 16–34)
ECHO LA VOLUME INDEX MOD A4C: 23 ML/M2 (ref 16–34)
ECHO LV E' LATERAL VELOCITY: 7.94 CM/S
ECHO LV EDV A2C: 51 ML
ECHO LV EDV A4C: 59 ML
ECHO LV EDV INDEX A4C: 31 ML/M2
ECHO LV EDV NDEX A2C: 27 ML/M2
ECHO LV EF PHYSICIAN: 65 %
ECHO LV EJECTION FRACTION A2C: 69 %
ECHO LV EJECTION FRACTION A4C: 61 %
ECHO LV EJECTION FRACTION BIPLANE: 65 % (ref 55–100)
ECHO LV ESV A2C: 16 ML
ECHO LV ESV A4C: 23 ML
ECHO LV ESV INDEX A2C: 8 ML/M2
ECHO LV ESV INDEX A4C: 12 ML/M2
ECHO LV FRACTIONAL SHORTENING: 32 % (ref 28–44)
ECHO LV INTERNAL DIMENSION DIASTOLE INDEX: 2.17 CM/M2
ECHO LV INTERNAL DIMENSION DIASTOLIC: 4.1 CM (ref 3.9–5.3)
ECHO LV INTERNAL DIMENSION SYSTOLIC INDEX: 1.48 CM/M2
ECHO LV INTERNAL DIMENSION SYSTOLIC: 2.8 CM
ECHO LV IVSD: 1.8 CM (ref 0.6–0.9)
ECHO LV MASS 2D: 228.6 G (ref 67–162)
ECHO LV MASS INDEX 2D: 121 G/M2 (ref 43–95)
ECHO LV POSTERIOR WALL DIASTOLIC: 1.1 CM (ref 0.6–0.9)
ECHO LV RELATIVE WALL THICKNESS RATIO: 0.54
ECHO LVOT AV VTI INDEX: 0.8
ECHO LVOT MEAN GRADIENT: 3 MMHG
ECHO LVOT PEAK GRADIENT: 5 MMHG
ECHO LVOT PEAK VELOCITY: 1.2 M/S
ECHO LVOT VTI: 25.3 CM
ECHO MV A VELOCITY: 1.16 M/S
ECHO MV E DECELERATION TIME (DT): 211 MS
ECHO MV E VELOCITY: 0.96 M/S
ECHO MV E/A RATIO: 0.83
ECHO MV E/E' LATERAL: 12.09
ECHO PV MAX VELOCITY: 0.9 M/S
ECHO PV PEAK GRADIENT: 3 MMHG
ECHO RIGHT VENTRICULAR SYSTOLIC PRESSURE (RVSP): 22 MMHG
ECHO RV TAPSE: 2.9 CM (ref 1.7–?)
ECHO TV REGURGITANT MAX VELOCITY: 2.2 M/S
ECHO TV REGURGITANT PEAK GRADIENT: 19 MMHG
EKG ATRIAL RATE: 68 BPM
EKG ATRIAL RATE: 72 BPM
EKG P AXIS: 43 DEGREES
EKG P AXIS: 44 DEGREES
EKG P-R INTERVAL: 170 MS
EKG P-R INTERVAL: 188 MS
EKG Q-T INTERVAL: 382 MS
EKG Q-T INTERVAL: 412 MS
EKG QRS DURATION: 74 MS
EKG QRS DURATION: 76 MS
EKG QTC CALCULATION (BAZETT): 418 MS
EKG QTC CALCULATION (BAZETT): 438 MS
EKG R AXIS: -10 DEGREES
EKG R AXIS: -2 DEGREES
EKG T AXIS: 32 DEGREES
EKG T AXIS: 35 DEGREES
EKG VENTRICULAR RATE: 68 BPM
EKG VENTRICULAR RATE: 72 BPM
EOSINOPHIL # BLD: 0.24 K/UL (ref 0–0.44)
EOSINOPHILS RELATIVE PERCENT: 3 % (ref 1–4)
ERYTHROCYTE [DISTWIDTH] IN BLOOD BY AUTOMATED COUNT: 13.3 % (ref 11.8–14.4)
EST. AVERAGE GLUCOSE BLD GHB EST-MCNC: 114 MG/DL
GFR, ESTIMATED: >90 ML/MIN/1.73M2
GLUCOSE SERPL-MCNC: 115 MG/DL (ref 74–99)
HBA1C MFR BLD: 5.6 % (ref 4–6)
HCT VFR BLD AUTO: 41.7 % (ref 36.3–47.1)
HDLC SERPL-MCNC: 39 MG/DL
HGB BLD-MCNC: 13.7 G/DL (ref 11.9–15.1)
IMM GRANULOCYTES # BLD AUTO: 0.05 K/UL (ref 0–0.3)
IMM GRANULOCYTES NFR BLD: 1 %
INR PPP: 2.7
LDLC SERPL CALC-MCNC: 33 MG/DL (ref 0–100)
LIPASE SERPL-CCNC: 31 U/L (ref 13–60)
LYMPHOCYTES NFR BLD: 2.82 K/UL (ref 1.1–3.7)
LYMPHOCYTES RELATIVE PERCENT: 33 % (ref 24–43)
MCH RBC QN AUTO: 29.6 PG (ref 25.2–33.5)
MCHC RBC AUTO-ENTMCNC: 32.9 G/DL (ref 28.4–34.8)
MCV RBC AUTO: 90.1 FL (ref 82.6–102.9)
MONOCYTES NFR BLD: 0.67 K/UL (ref 0.1–1.2)
MONOCYTES NFR BLD: 8 % (ref 3–12)
NEUTROPHILS NFR BLD: 54 % (ref 36–65)
NEUTS SEG NFR BLD: 4.78 K/UL (ref 1.5–8.1)
NRBC BLD-RTO: 0 PER 100 WBC
NUC STRESS EJECTION FRACTION: 79 %
PLATELET # BLD AUTO: 253 K/UL (ref 138–453)
PMV BLD AUTO: 9 FL (ref 8.1–13.5)
POTASSIUM SERPL-SCNC: 3.8 MMOL/L (ref 3.7–5.3)
PROT SERPL-MCNC: 7.1 G/DL (ref 6.6–8.7)
PROTHROMBIN TIME: 27.8 SEC (ref 11.7–14.1)
RBC # BLD AUTO: 4.63 M/UL (ref 3.95–5.11)
SODIUM SERPL-SCNC: 140 MMOL/L (ref 136–145)
STRESS BASELINE DIAS BP: 80 MMHG
STRESS BASELINE HR: 71 BPM
STRESS BASELINE ST DEPRESSION: 0 MM
STRESS BASELINE SYS BP: 142 MMHG
STRESS ESTIMATED WORKLOAD: 1 METS
STRESS PEAK DIAS BP: 80 MMHG
STRESS PEAK SYS BP: 150 MMHG
STRESS PERCENT HR ACHIEVED: 75 %
STRESS POST PEAK HR: 110 BPM
STRESS RATE PRESSURE PRODUCT: NORMAL BPM*MMHG
STRESS TARGET HR: 147 BPM
TID: 1.15
TRIGL SERPL-MCNC: 319 MG/DL
TROPONIN I SERPL HS-MCNC: 15 NG/L (ref 0–14)
TROPONIN I SERPL HS-MCNC: 16 NG/L (ref 0–14)
TROPONIN I SERPL HS-MCNC: 18 NG/L (ref 0–14)
VLDLC SERPL CALC-MCNC: 64 MG/DL (ref 1–30)
WBC OTHER # BLD: 8.6 K/UL (ref 3.5–11.3)

## 2025-05-19 PROCEDURE — 93010 ELECTROCARDIOGRAM REPORT: CPT | Performed by: INTERNAL MEDICINE

## 2025-05-19 PROCEDURE — G0378 HOSPITAL OBSERVATION PER HR: HCPCS

## 2025-05-19 PROCEDURE — 85025 COMPLETE CBC W/AUTO DIFF WBC: CPT

## 2025-05-19 PROCEDURE — 93306 TTE W/DOPPLER COMPLETE: CPT

## 2025-05-19 PROCEDURE — 93306 TTE W/DOPPLER COMPLETE: CPT | Performed by: FAMILY MEDICINE

## 2025-05-19 PROCEDURE — 2500000003 HC RX 250 WO HCPCS: Performed by: NURSE PRACTITIONER

## 2025-05-19 PROCEDURE — A9500 TC99M SESTAMIBI: HCPCS | Performed by: NURSE PRACTITIONER

## 2025-05-19 PROCEDURE — 6370000000 HC RX 637 (ALT 250 FOR IP): Performed by: STUDENT IN AN ORGANIZED HEALTH CARE EDUCATION/TRAINING PROGRAM

## 2025-05-19 PROCEDURE — 78452 HT MUSCLE IMAGE SPECT MULT: CPT | Performed by: INTERNAL MEDICINE

## 2025-05-19 PROCEDURE — 85610 PROTHROMBIN TIME: CPT

## 2025-05-19 PROCEDURE — 93016 CV STRESS TEST SUPVJ ONLY: CPT | Performed by: INTERNAL MEDICINE

## 2025-05-19 PROCEDURE — 3430000000 HC RX DIAGNOSTIC RADIOPHARMACEUTICAL: Performed by: NURSE PRACTITIONER

## 2025-05-19 PROCEDURE — 80053 COMPREHEN METABOLIC PANEL: CPT

## 2025-05-19 PROCEDURE — 80061 LIPID PANEL: CPT

## 2025-05-19 PROCEDURE — 2580000003 HC RX 258: Performed by: STUDENT IN AN ORGANIZED HEALTH CARE EDUCATION/TRAINING PROGRAM

## 2025-05-19 PROCEDURE — 83880 ASSAY OF NATRIURETIC PEPTIDE: CPT

## 2025-05-19 PROCEDURE — 99285 EMERGENCY DEPT VISIT HI MDM: CPT

## 2025-05-19 PROCEDURE — 6360000004 HC RX CONTRAST MEDICATION: Performed by: STUDENT IN AN ORGANIZED HEALTH CARE EDUCATION/TRAINING PROGRAM

## 2025-05-19 PROCEDURE — 74177 CT ABD & PELVIS W/CONTRAST: CPT

## 2025-05-19 PROCEDURE — 83036 HEMOGLOBIN GLYCOSYLATED A1C: CPT

## 2025-05-19 PROCEDURE — 6360000002 HC RX W HCPCS: Performed by: STUDENT IN AN ORGANIZED HEALTH CARE EDUCATION/TRAINING PROGRAM

## 2025-05-19 PROCEDURE — 84484 ASSAY OF TROPONIN QUANT: CPT

## 2025-05-19 PROCEDURE — 6370000000 HC RX 637 (ALT 250 FOR IP): Performed by: NURSE PRACTITIONER

## 2025-05-19 PROCEDURE — 94761 N-INVAS EAR/PLS OXIMETRY MLT: CPT

## 2025-05-19 PROCEDURE — 93005 ELECTROCARDIOGRAM TRACING: CPT | Performed by: STUDENT IN AN ORGANIZED HEALTH CARE EDUCATION/TRAINING PROGRAM

## 2025-05-19 PROCEDURE — 93018 CV STRESS TEST I&R ONLY: CPT | Performed by: INTERNAL MEDICINE

## 2025-05-19 PROCEDURE — 71045 X-RAY EXAM CHEST 1 VIEW: CPT

## 2025-05-19 PROCEDURE — 96374 THER/PROPH/DIAG INJ IV PUSH: CPT

## 2025-05-19 PROCEDURE — 78452 HT MUSCLE IMAGE SPECT MULT: CPT

## 2025-05-19 PROCEDURE — 6360000002 HC RX W HCPCS: Performed by: FAMILY MEDICINE

## 2025-05-19 PROCEDURE — 83690 ASSAY OF LIPASE: CPT

## 2025-05-19 RX ORDER — ASPIRIN 325 MG
325 TABLET ORAL ONCE
Status: COMPLETED | OUTPATIENT
Start: 2025-05-19 | End: 2025-05-19

## 2025-05-19 RX ORDER — ALBUTEROL SULFATE 0.83 MG/ML
2.5 SOLUTION RESPIRATORY (INHALATION) EVERY 6 HOURS PRN
Status: DISCONTINUED | OUTPATIENT
Start: 2025-05-19 | End: 2025-05-19 | Stop reason: SDUPTHER

## 2025-05-19 RX ORDER — ACETAMINOPHEN 325 MG/1
650 TABLET ORAL EVERY 6 HOURS PRN
Status: DISCONTINUED | OUTPATIENT
Start: 2025-05-19 | End: 2025-05-20 | Stop reason: HOSPADM

## 2025-05-19 RX ORDER — SODIUM CHLORIDE 0.9 % (FLUSH) 0.9 %
5-40 SYRINGE (ML) INJECTION PRN
Status: DISCONTINUED | OUTPATIENT
Start: 2025-05-19 | End: 2025-05-20 | Stop reason: HOSPADM

## 2025-05-19 RX ORDER — REGADENOSON 0.08 MG/ML
0.4 INJECTION, SOLUTION INTRAVENOUS
Status: COMPLETED | OUTPATIENT
Start: 2025-05-19 | End: 2025-05-19

## 2025-05-19 RX ORDER — ASPIRIN 81 MG/1
81 TABLET, CHEWABLE ORAL DAILY
Status: DISCONTINUED | OUTPATIENT
Start: 2025-05-20 | End: 2025-05-20 | Stop reason: HOSPADM

## 2025-05-19 RX ORDER — ACETAMINOPHEN 650 MG/1
650 SUPPOSITORY RECTAL EVERY 6 HOURS PRN
Status: DISCONTINUED | OUTPATIENT
Start: 2025-05-19 | End: 2025-05-20 | Stop reason: HOSPADM

## 2025-05-19 RX ORDER — ATROPINE SULFATE 0.1 MG/ML
0.5 INJECTION INTRAVENOUS EVERY 5 MIN PRN
Status: ACTIVE | OUTPATIENT
Start: 2025-05-19 | End: 2025-05-19

## 2025-05-19 RX ORDER — SODIUM CHLORIDE 9 MG/ML
INJECTION, SOLUTION INTRAVENOUS PRN
Status: DISCONTINUED | OUTPATIENT
Start: 2025-05-19 | End: 2025-05-20 | Stop reason: HOSPADM

## 2025-05-19 RX ORDER — IOPAMIDOL 755 MG/ML
75 INJECTION, SOLUTION INTRAVASCULAR
Status: COMPLETED | OUTPATIENT
Start: 2025-05-19 | End: 2025-05-19

## 2025-05-19 RX ORDER — WARFARIN SODIUM 5 MG/1
2.5 TABLET ORAL
Status: COMPLETED | OUTPATIENT
Start: 2025-05-19 | End: 2025-05-19

## 2025-05-19 RX ORDER — FAMOTIDINE 20 MG/1
20 TABLET, FILM COATED ORAL 2 TIMES DAILY
Status: DISCONTINUED | OUTPATIENT
Start: 2025-05-19 | End: 2025-05-20 | Stop reason: HOSPADM

## 2025-05-19 RX ORDER — SODIUM CHLORIDE 0.9 % (FLUSH) 0.9 %
5-40 SYRINGE (ML) INJECTION PRN
Status: ACTIVE | OUTPATIENT
Start: 2025-05-19 | End: 2025-05-19

## 2025-05-19 RX ORDER — CETIRIZINE HYDROCHLORIDE 10 MG/1
10 TABLET ORAL DAILY
Status: DISCONTINUED | OUTPATIENT
Start: 2025-05-19 | End: 2025-05-20 | Stop reason: HOSPADM

## 2025-05-19 RX ORDER — POTASSIUM CHLORIDE 1500 MG/1
40 TABLET, EXTENDED RELEASE ORAL PRN
Status: DISCONTINUED | OUTPATIENT
Start: 2025-05-19 | End: 2025-05-20 | Stop reason: HOSPADM

## 2025-05-19 RX ORDER — CARVEDILOL 3.12 MG/1
3.12 TABLET ORAL 2 TIMES DAILY WITH MEALS
Status: DISCONTINUED | OUTPATIENT
Start: 2025-05-19 | End: 2025-05-20 | Stop reason: HOSPADM

## 2025-05-19 RX ORDER — NITROGLYCERIN 0.4 MG/1
0.4 TABLET SUBLINGUAL EVERY 5 MIN PRN
Status: DISCONTINUED | OUTPATIENT
Start: 2025-05-19 | End: 2025-05-19

## 2025-05-19 RX ORDER — NITROGLYCERIN 0.4 MG/1
0.4 TABLET SUBLINGUAL EVERY 5 MIN PRN
Status: DISCONTINUED | OUTPATIENT
Start: 2025-05-19 | End: 2025-05-20 | Stop reason: HOSPADM

## 2025-05-19 RX ORDER — ROSUVASTATIN CALCIUM 10 MG/1
10 TABLET, COATED ORAL
COMMUNITY

## 2025-05-19 RX ORDER — SODIUM CHLORIDE 9 MG/ML
500 INJECTION, SOLUTION INTRAVENOUS CONTINUOUS PRN
Status: ACTIVE | OUTPATIENT
Start: 2025-05-19 | End: 2025-05-19

## 2025-05-19 RX ORDER — OXYCODONE AND ACETAMINOPHEN 5; 325 MG/1; MG/1
1 TABLET ORAL EVERY 6 HOURS PRN
Status: DISCONTINUED | OUTPATIENT
Start: 2025-05-19 | End: 2025-05-20 | Stop reason: HOSPADM

## 2025-05-19 RX ORDER — POLYETHYLENE GLYCOL 3350 17 G/17G
17 POWDER, FOR SOLUTION ORAL DAILY PRN
Status: DISCONTINUED | OUTPATIENT
Start: 2025-05-19 | End: 2025-05-20 | Stop reason: HOSPADM

## 2025-05-19 RX ORDER — MAGNESIUM SULFATE IN WATER 40 MG/ML
2000 INJECTION, SOLUTION INTRAVENOUS PRN
Status: DISCONTINUED | OUTPATIENT
Start: 2025-05-19 | End: 2025-05-20 | Stop reason: HOSPADM

## 2025-05-19 RX ORDER — PANTOPRAZOLE SODIUM 40 MG/1
40 TABLET, DELAYED RELEASE ORAL
Status: DISCONTINUED | OUTPATIENT
Start: 2025-05-20 | End: 2025-05-20 | Stop reason: HOSPADM

## 2025-05-19 RX ORDER — ONDANSETRON 2 MG/ML
4 INJECTION INTRAMUSCULAR; INTRAVENOUS EVERY 6 HOURS PRN
Status: DISCONTINUED | OUTPATIENT
Start: 2025-05-19 | End: 2025-05-20 | Stop reason: HOSPADM

## 2025-05-19 RX ORDER — ONDANSETRON 4 MG/1
4 TABLET, ORALLY DISINTEGRATING ORAL EVERY 8 HOURS PRN
Status: DISCONTINUED | OUTPATIENT
Start: 2025-05-19 | End: 2025-05-20 | Stop reason: HOSPADM

## 2025-05-19 RX ORDER — SODIUM CHLORIDE 0.9 % (FLUSH) 0.9 %
5-40 SYRINGE (ML) INJECTION EVERY 12 HOURS SCHEDULED
Status: DISCONTINUED | OUTPATIENT
Start: 2025-05-19 | End: 2025-05-20 | Stop reason: HOSPADM

## 2025-05-19 RX ORDER — LORAZEPAM 1 MG/1
1 TABLET ORAL 3 TIMES DAILY PRN
Status: DISCONTINUED | OUTPATIENT
Start: 2025-05-19 | End: 2025-05-20 | Stop reason: HOSPADM

## 2025-05-19 RX ORDER — ATORVASTATIN CALCIUM 20 MG/1
20 TABLET, FILM COATED ORAL NIGHTLY
Status: DISCONTINUED | OUTPATIENT
Start: 2025-05-19 | End: 2025-05-19

## 2025-05-19 RX ORDER — ATORVASTATIN CALCIUM 40 MG/1
40 TABLET, FILM COATED ORAL NIGHTLY
Status: DISCONTINUED | OUTPATIENT
Start: 2025-05-19 | End: 2025-05-20 | Stop reason: HOSPADM

## 2025-05-19 RX ORDER — METOPROLOL TARTRATE 1 MG/ML
5 INJECTION, SOLUTION INTRAVENOUS EVERY 5 MIN PRN
Status: ACTIVE | OUTPATIENT
Start: 2025-05-19 | End: 2025-05-19

## 2025-05-19 RX ORDER — POTASSIUM CHLORIDE 7.45 MG/ML
10 INJECTION INTRAVENOUS PRN
Status: DISCONTINUED | OUTPATIENT
Start: 2025-05-19 | End: 2025-05-20 | Stop reason: HOSPADM

## 2025-05-19 RX ORDER — TETRAKIS(2-METHOXYISOBUTYLISOCYANIDE)COPPER(I) TETRAFLUOROBORATE 1 MG/ML
30 INJECTION, POWDER, LYOPHILIZED, FOR SOLUTION INTRAVENOUS
Status: COMPLETED | OUTPATIENT
Start: 2025-05-19 | End: 2025-05-19

## 2025-05-19 RX ORDER — FLUTICASONE PROPIONATE 50 MCG
1 SPRAY, SUSPENSION (ML) NASAL DAILY
Status: DISCONTINUED | OUTPATIENT
Start: 2025-05-19 | End: 2025-05-20 | Stop reason: HOSPADM

## 2025-05-19 RX ORDER — LANOLIN ALCOHOL/MO/W.PET/CERES
1000 CREAM (GRAM) TOPICAL DAILY
Status: DISCONTINUED | OUTPATIENT
Start: 2025-05-19 | End: 2025-05-20 | Stop reason: HOSPADM

## 2025-05-19 RX ORDER — ALBUTEROL SULFATE 90 UG/1
2 INHALANT RESPIRATORY (INHALATION) PRN
Status: ACTIVE | OUTPATIENT
Start: 2025-05-19 | End: 2025-05-19

## 2025-05-19 RX ORDER — TETRAKIS(2-METHOXYISOBUTYLISOCYANIDE)COPPER(I) TETRAFLUOROBORATE 1 MG/ML
10 INJECTION, POWDER, LYOPHILIZED, FOR SOLUTION INTRAVENOUS
Status: COMPLETED | OUTPATIENT
Start: 2025-05-19 | End: 2025-05-19

## 2025-05-19 RX ADMIN — REGADENOSON 0.4 MG: 0.08 INJECTION, SOLUTION INTRAVENOUS at 12:24

## 2025-05-19 RX ADMIN — CARVEDILOL 3.12 MG: 3.12 TABLET, FILM COATED ORAL at 17:14

## 2025-05-19 RX ADMIN — Medication 10 MILLICURIE: at 10:26

## 2025-05-19 RX ADMIN — OXYCODONE AND ACETAMINOPHEN 1 TABLET: 5; 325 TABLET ORAL at 13:33

## 2025-05-19 RX ADMIN — SODIUM CHLORIDE, PRESERVATIVE FREE 10 ML: 5 INJECTION INTRAVENOUS at 20:06

## 2025-05-19 RX ADMIN — NITROGLYCERIN 0.4 MG: 0.4 TABLET SUBLINGUAL at 07:50

## 2025-05-19 RX ADMIN — FAMOTIDINE 20 MG: 10 INJECTION, SOLUTION INTRAVENOUS at 04:38

## 2025-05-19 RX ADMIN — IOPAMIDOL 75 ML: 755 INJECTION, SOLUTION INTRAVENOUS at 05:11

## 2025-05-19 RX ADMIN — Medication 30 MILLICURIE: at 11:57

## 2025-05-19 RX ADMIN — LORAZEPAM 1 MG: 1 TABLET ORAL at 22:08

## 2025-05-19 RX ADMIN — LIDOCAINE HYDROCHLORIDE: 20 SOLUTION ORAL at 04:37

## 2025-05-19 RX ADMIN — ASPIRIN 325 MG: 325 TABLET ORAL at 05:26

## 2025-05-19 RX ADMIN — SODIUM CHLORIDE, PRESERVATIVE FREE 10 ML: 5 INJECTION INTRAVENOUS at 09:54

## 2025-05-19 RX ADMIN — WARFARIN SODIUM 2.5 MG: 5 TABLET ORAL at 17:14

## 2025-05-19 RX ADMIN — OXYCODONE AND ACETAMINOPHEN 1 TABLET: 5; 325 TABLET ORAL at 22:08

## 2025-05-19 RX ADMIN — CALCIUM CARBONATE-VITAMIN D TAB 500 MG-200 UNIT 1 TABLET: 500-200 TAB at 20:06

## 2025-05-19 RX ADMIN — ATORVASTATIN CALCIUM 40 MG: 40 TABLET, FILM COATED ORAL at 20:06

## 2025-05-19 RX ADMIN — FAMOTIDINE 20 MG: 20 TABLET, FILM COATED ORAL at 20:06

## 2025-05-19 ASSESSMENT — PAIN DESCRIPTION - ORIENTATION
ORIENTATION: UPPER
ORIENTATION: RIGHT;LEFT

## 2025-05-19 ASSESSMENT — PAIN DESCRIPTION - LOCATION
LOCATION: CHEST
LOCATION: ABDOMEN
LOCATION: LEG

## 2025-05-19 ASSESSMENT — PAIN - FUNCTIONAL ASSESSMENT
PAIN_FUNCTIONAL_ASSESSMENT: ACTIVITIES ARE NOT PREVENTED
PAIN_FUNCTIONAL_ASSESSMENT: 0-10
PAIN_FUNCTIONAL_ASSESSMENT: ACTIVITIES ARE NOT PREVENTED
PAIN_FUNCTIONAL_ASSESSMENT: ACTIVITIES ARE NOT PREVENTED

## 2025-05-19 ASSESSMENT — LIFESTYLE VARIABLES
HOW MANY STANDARD DRINKS CONTAINING ALCOHOL DO YOU HAVE ON A TYPICAL DAY: PATIENT DOES NOT DRINK
HOW OFTEN DO YOU HAVE A DRINK CONTAINING ALCOHOL: NEVER
HOW MANY STANDARD DRINKS CONTAINING ALCOHOL DO YOU HAVE ON A TYPICAL DAY: PATIENT DOES NOT DRINK
HOW OFTEN DO YOU HAVE A DRINK CONTAINING ALCOHOL: NEVER

## 2025-05-19 ASSESSMENT — PAIN DESCRIPTION - PAIN TYPE
TYPE: ACUTE PAIN
TYPE: ACUTE PAIN

## 2025-05-19 ASSESSMENT — PAIN DESCRIPTION - DESCRIPTORS
DESCRIPTORS: PRESSURE

## 2025-05-19 ASSESSMENT — PAIN SCALES - GENERAL
PAINLEVEL_OUTOF10: 3
PAINLEVEL_OUTOF10: 4
PAINLEVEL_OUTOF10: 6
PAINLEVEL_OUTOF10: 5
PAINLEVEL_OUTOF10: 7

## 2025-05-19 ASSESSMENT — PAIN DESCRIPTION - ONSET
ONSET: ON-GOING
ONSET: ON-GOING

## 2025-05-19 ASSESSMENT — HEART SCORE: ECG: NORMAL

## 2025-05-19 ASSESSMENT — ENCOUNTER SYMPTOMS
NAUSEA: 1
VOMITING: 0
SHORTNESS OF BREATH: 1
ABDOMINAL PAIN: 1

## 2025-05-19 ASSESSMENT — PAIN DESCRIPTION - FREQUENCY
FREQUENCY: CONTINUOUS
FREQUENCY: CONTINUOUS

## 2025-05-19 NOTE — PROGRESS NOTES
Writer to bedside for afternoon assessment. Patient is resting in bed,no distress noted.  is at bedside. Patient reports an improvement is pain following PRN medication administration. Breathing is regular and unlabored, lung sounds are clear. Patient denies further needs at this time. Call light is within reach. Care ongoing.

## 2025-05-19 NOTE — H&P
History and Physical    Patient:  Rupal Roach  MRN: [id number]    Chief Complaint: Abdominal pain, chest pain and jaw pain    History Obtained From:  patient, electronic medical record    PCP: Kulwinder Schrader MD    History of Present Illness:   The patient is a 75 y.o. female who presented to the emergency room with complaints of upper abdominal pain and lower chest pain.  Patient reported palpitations at home and reported that it felt like her  \" heart was pounding out of my chest\".  She denied syncopal episodes or falls.  She denied fever or chills.  She reported that she has had an upper respiratory infection over the past week with some nasal congestion and drainage.  She reported that she ate fried salmon last evening.  She stated sometimes when she eats greasy foods it goes right through her.  Patient does report history of blood clots in the past with pulmonary embolism and is chronically on Coumadin.  She did report some shortness of breath but no hypoxia was seen.  She described a fullness feeling in her upper abdomen and lower chest and described it as a balloon like feeling that comes and goes.  No diaphoresis was noted.  She reported occasional nausea.  Patient's vital signs were unremarkable during her evaluation.  Patient CMP and CBC were unremarkable.  Troponins were 15, 16 and 18.  Chest x-ray showed no acute findings.  CT abdomen and pelvis showed no acute findings as well.  Gallbladder was unremarkable.  EKG showed normal sinus rhythm with no change from previous.    Past Medical History:        Diagnosis Date    Anxiety     Arthritis     Asthma     Blood circulation, collateral     CAD (coronary artery disease)     Chronic back pain     Depression     YEARS AGO NO RECENT PROBLEMS    Difficult intravenous access     AT TIMES    Disease of blood and blood forming organ     Diverticulitis     DVT (deep venous thrombosis) (HCC)     Right upper arm    Dyslexia     GERD (gastroesophageal reflux  utilized all available immediate resources to obtain, update, or review the patient's current medications (including all prescriptions, over-the-counter products, herbals, cannabinoid products and bitamin/mineral/dietary/nutritional supplements.  [If 'yes\", STOP HERE]     []  The patient is not eligible for medication reconciliation; the patient is in an emergent medical situation where delaying treatment would jeopardize the patient's health    []  I did NOT confirm, update or review the patient's current list of medications today.  [DOES NOT SATISFY MIPS PERFORMANCE]        [facility] Advanced Care Planning documentation:  [x] I have confirmed that the patient's Advance Care Plan is present, Code Status is documented, or surrogate decision maker is listed in the patient's medical record  [If \"yes\", STOP HERE]     [] The patient's Advance Care Plan is NOT present because:    []  I confirmed today that the patient does not wish or was not able to name a   surrogate decision maker or provide and advance care plan.    [] Hospice care is currently being provided or has been provided within the   calendar year.    []  I did NOT confirm today the presence of an Advance Care Plan or surrogate   decision maker documented within the patient's medical record.   [DOES NOT SATISFY MIPS PERFORMANCE]    CORE MEASURES  DVT prophylaxis: already on chronic anticoagulation  Decubitus ulcer present on admission: No  CODE STATUS: FULL CODE  Nutrition Status: good   Physical therapy: NA   Old Charts reviewed: Yes  EKG Reviewed: Yes  Advance Directive Addressed: Yes    CHRISTIAN Aparicio - CNP, CHRISTIAN, NP-C  5/19/2025, 10:56 AM      Please note that this chart was generated using voice recognition Dragon dictation software. Although every effort was made to ensure the accuracy of this automated transcription, some errors in transcription may have occurred.

## 2025-05-19 NOTE — PROGRESS NOTES
Patient arrived to [facility] room 320 at this time. Report received from ED nurse at bedside. Patient is resting in bed. Patient is A&Ox4. Breathing is regular and unlabored lung sounds are clear. Patient reports continuous chest pain. Writer asked patient to point where she feels pain and patient pointed to upper abdominal area. Patient described the pain as pressure. Patient states it has improved since receiving medications in ED. Patient reports intermittent lightheadedness and palpitations today but currently denies. Abdomen is soft and tender to palpation. Bowel sounds are active x4. Patient also reports chronic acid reflux. Blankets given to patient. Patient is aware they are to remain NPO until testing is complete. Admission navigator and med rec complete. Patient denies further needs call light is within reach. Care ongoing.

## 2025-05-19 NOTE — ED PROVIDER NOTES
[facility]  EMERGENCY DEPARTMENT ENCOUNTER      Pt Name: Rupal Roach  MRN: [id number]  Birthdate 1950  Date of evaluation: 5/19/2025  Provider: Dereck Durán MD    CHIEF COMPLAINT       Chief Complaint   Patient presents with    Chest Pain     Started \"a little before 9pm\" last night. Patient notices the pain after eating greasy food. Patient also expresses shortness of breath, speaking in full sentences 95% on RA. Hx of blood clots.     HISTORY OF PRESENT ILLNESS      Rupal Roach is a 75 y.o. female who presents to the emergency department for evaluation of shortness of breath ongoing for 1 week as well as chest pain which began last night.  Patient cannot recall inciting factor for the chest pain.  When asked to point where the chest pain is she points to the epigastric region of her abdomen.  She states that it feels like a fullness like a balloon is in the upper portion of her belly and lower chest.  She says sometimes it feels like it deflates but then it will come right back.  Sometimes feels nauseated with that.  No diaphoresis associated.    She states just prior to arrival today she was walking through the house when she felt short of breath and felt lightheaded, prompting her to be evaluated.     Patient denies any new back pain aside from her chronic back pain.  No fevers.  No recent cough congestion or runny nose.    Patient states that she is on warfarin for history of blood clot after surgery which went to her lungs.    Last stress test in 2022:  IMPRESSION:  1.  Normal myocardial perfusion imaging without significant evidence of  myocardial ischemia or infarction.  2.  Global left ventricular systolic function was normal with an  ejection fraction of 70%, without regional wall motion abnormalities.  3.  No significant electrocardiographic evidence of myocardial ischemia  during EKG monitoring without significant associated arrhythmias.  Overall, these results are most

## 2025-05-19 NOTE — CONSULTS
[facility]  Pharmacy Department    Clinical Pharmacy Note-Warfarin Follow-up       Patient:  Rupal Roach  MRN: [id number]    Warfarin consult follow-up:    INR (no units)   Date Value   05/19/2025 2.7   04/05/2024 2.0   12/14/2023 2.1   11/29/2022 2.7   11/28/2022 1.9   11/27/2022 1.6   11/11/2021 1.0       Hemoglobin (g/dL)   Date Value   05/19/2025 13.7   04/05/2024 13.0   12/14/2023 14.2     Hematocrit (%)   Date Value   05/19/2025 41.7   04/05/2024 39.2   12/14/2023 43.9     Platelet Count (k/uL)   Date Value   12/09/2011 262     Platelets (k/uL)   Date Value   05/19/2025 253   04/05/2024 227   12/14/2023 229       Target INR Range: 2-3    Significant Drug-Drug Interactions:  New warfarin drug-drug interactions: none  Discontinued drug-drug interactions: none      Notes:                   Warfarin 2.5 mg po today  Daily PT/INR until stable within therapeutic range.       Vickie Padilla RPH,   5/19/2025, 9:37 AM

## 2025-05-20 VITALS
OXYGEN SATURATION: 94 % | RESPIRATION RATE: 20 BRPM | BODY MASS INDEX: 33.83 KG/M2 | SYSTOLIC BLOOD PRESSURE: 133 MMHG | HEIGHT: 63 IN | WEIGHT: 190.92 LBS | TEMPERATURE: 97.2 F | DIASTOLIC BLOOD PRESSURE: 68 MMHG | HEART RATE: 71 BPM

## 2025-05-20 LAB
ANION GAP SERPL CALCULATED.3IONS-SCNC: 10 MMOL/L (ref 9–16)
BUN SERPL-MCNC: 10 MG/DL (ref 8–23)
BUN/CREAT SERPL: 14 (ref 9–20)
CALCIUM SERPL-MCNC: 9.2 MG/DL (ref 8.6–10.4)
CHLORIDE SERPL-SCNC: 103 MMOL/L (ref 98–107)
CO2 SERPL-SCNC: 28 MMOL/L (ref 20–31)
CREAT SERPL-MCNC: 0.7 MG/DL (ref 0.5–0.9)
ERYTHROCYTE [DISTWIDTH] IN BLOOD BY AUTOMATED COUNT: 13.5 % (ref 11.8–14.4)
GFR, ESTIMATED: >90 ML/MIN/1.73M2
GLUCOSE SERPL-MCNC: 100 MG/DL (ref 74–99)
HCT VFR BLD AUTO: 38.6 % (ref 36.3–47.1)
HGB BLD-MCNC: 12.7 G/DL (ref 11.9–15.1)
INR PPP: 2.5
MAGNESIUM SERPL-MCNC: 2 MG/DL (ref 1.6–2.4)
MCH RBC QN AUTO: 29.7 PG (ref 25.2–33.5)
MCHC RBC AUTO-ENTMCNC: 32.9 G/DL (ref 28.4–34.8)
MCV RBC AUTO: 90.2 FL (ref 82.6–102.9)
NRBC BLD-RTO: 0 PER 100 WBC
PLATELET # BLD AUTO: 229 K/UL (ref 138–453)
PMV BLD AUTO: 9 FL (ref 8.1–13.5)
POTASSIUM SERPL-SCNC: 4 MMOL/L (ref 3.7–5.3)
PROTHROMBIN TIME: 25.6 SEC (ref 11.7–14.1)
RBC # BLD AUTO: 4.28 M/UL (ref 3.95–5.11)
SODIUM SERPL-SCNC: 141 MMOL/L (ref 136–145)
WBC OTHER # BLD: 7.2 K/UL (ref 3.5–11.3)

## 2025-05-20 PROCEDURE — 85610 PROTHROMBIN TIME: CPT

## 2025-05-20 PROCEDURE — 85027 COMPLETE CBC AUTOMATED: CPT

## 2025-05-20 PROCEDURE — 83735 ASSAY OF MAGNESIUM: CPT

## 2025-05-20 PROCEDURE — 2500000003 HC RX 250 WO HCPCS: Performed by: NURSE PRACTITIONER

## 2025-05-20 PROCEDURE — G0378 HOSPITAL OBSERVATION PER HR: HCPCS

## 2025-05-20 PROCEDURE — 94761 N-INVAS EAR/PLS OXIMETRY MLT: CPT

## 2025-05-20 PROCEDURE — 36415 COLL VENOUS BLD VENIPUNCTURE: CPT

## 2025-05-20 PROCEDURE — 80048 BASIC METABOLIC PNL TOTAL CA: CPT

## 2025-05-20 PROCEDURE — 6370000000 HC RX 637 (ALT 250 FOR IP): Performed by: NURSE PRACTITIONER

## 2025-05-20 RX ORDER — CETIRIZINE HYDROCHLORIDE 10 MG/1
10 TABLET ORAL DAILY
Qty: 30 TABLET | Refills: 1 | Status: SHIPPED | OUTPATIENT
Start: 2025-05-21

## 2025-05-20 RX ORDER — WARFARIN SODIUM 2.5 MG/1
3.75 TABLET ORAL
Status: DISCONTINUED | OUTPATIENT
Start: 2025-05-20 | End: 2025-05-20 | Stop reason: HOSPADM

## 2025-05-20 RX ORDER — FLUTICASONE PROPIONATE 50 MCG
1 SPRAY, SUSPENSION (ML) NASAL DAILY
Qty: 16 G | Refills: 3 | Status: SHIPPED | OUTPATIENT
Start: 2025-05-21

## 2025-05-20 RX ADMIN — ASPIRIN 81 MG: 81 TABLET, CHEWABLE ORAL at 08:16

## 2025-05-20 RX ADMIN — SODIUM CHLORIDE, PRESERVATIVE FREE 10 ML: 5 INJECTION INTRAVENOUS at 08:50

## 2025-05-20 RX ADMIN — CETIRIZINE HYDROCHLORIDE 10 MG: 10 TABLET, FILM COATED ORAL at 08:16

## 2025-05-20 RX ADMIN — CALCIUM CARBONATE-VITAMIN D TAB 500 MG-200 UNIT 1 TABLET: 500-200 TAB at 08:16

## 2025-05-20 RX ADMIN — CARVEDILOL 3.12 MG: 3.12 TABLET, FILM COATED ORAL at 08:16

## 2025-05-20 RX ADMIN — FLUTICASONE PROPIONATE 1 SPRAY: 50 SPRAY, METERED NASAL at 08:50

## 2025-05-20 RX ADMIN — PANTOPRAZOLE SODIUM 40 MG: 40 TABLET, DELAYED RELEASE ORAL at 08:16

## 2025-05-20 RX ADMIN — FAMOTIDINE 20 MG: 20 TABLET, FILM COATED ORAL at 08:16

## 2025-05-20 RX ADMIN — OXYCODONE AND ACETAMINOPHEN 1 TABLET: 5; 325 TABLET ORAL at 08:15

## 2025-05-20 RX ADMIN — CYANOCOBALAMIN TAB 1000 MCG 1000 MCG: 1000 TAB at 08:16

## 2025-05-20 ASSESSMENT — PAIN DESCRIPTION - LOCATION: LOCATION: JAW;EAR

## 2025-05-20 ASSESSMENT — PAIN DESCRIPTION - ORIENTATION: ORIENTATION: LEFT

## 2025-05-20 ASSESSMENT — PAIN DESCRIPTION - PAIN TYPE: TYPE: ACUTE PAIN

## 2025-05-20 ASSESSMENT — PAIN DESCRIPTION - FREQUENCY: FREQUENCY: CONTINUOUS

## 2025-05-20 ASSESSMENT — PAIN SCALES - GENERAL
PAINLEVEL_OUTOF10: 5
PAINLEVEL_OUTOF10: 5

## 2025-05-20 ASSESSMENT — PAIN DESCRIPTION - DESCRIPTORS: DESCRIPTORS: SORE

## 2025-05-20 ASSESSMENT — PAIN - FUNCTIONAL ASSESSMENT: PAIN_FUNCTIONAL_ASSESSMENT: ACTIVITIES ARE NOT PREVENTED

## 2025-05-20 ASSESSMENT — PAIN DESCRIPTION - ONSET: ONSET: ON-GOING

## 2025-05-20 NOTE — PROGRESS NOTES
Reviewed discharge instructions with patient.   Patient informed of need to  new prescriptions.   Reviewed home medications and when next dose is due.   Patient aware of date/time of follow up appointment.   Instructed to follow a low fat diet.  Educational handouts given on Low fat diet and high triglycerides.   Questions answered.   Verbalizes understanding.  Copy of discharge instructions given to patient.

## 2025-05-20 NOTE — DISCHARGE SUMMARY
Discharge Summary    Rupal Roach  :  1950  MRN:  [id number]    Admit date:  2025      Discharge date: 2025     Admitting Physician:  Myron Lyon MD    Discharge Diagnoses:    Principal Problem:    Chest pain in adult  Active Problems:    URI (upper respiratory infection)  Resolved Problems:    * No resolved hospital problems. *      Hospital Course:   Rupal Roach is a 75 y.o. female admitted with chest pain.  She  presented to the emergency room with complaints of upper abdominal pain and lower chest pain.  Patient reported palpitations at home and reported that it felt like her  \" heart was pounding out of my chest\".  She denied syncopal episodes or falls.  She denied fever or chills.  She reported that she has had an upper respiratory infection over the past week with some nasal congestion and drainage.  She reported that she ate fried salmon last evening.  She stated sometimes when she eats greasy foods it goes right through her.  Patient does report history of blood clots in the past with pulmonary embolism and is chronically on Coumadin.  She did report some shortness of breath but no hypoxia was seen.  She described a fullness feeling in her upper abdomen and lower chest and described it as a balloon like feeling that comes and goes.  No diaphoresis was noted.  She reported occasional nausea.  Patient's vital signs were unremarkable during her evaluation.  Patient CMP and CBC were unremarkable.  Troponins were 15, 16 and 18.  Chest x-ray showed no acute findings.  CT abdomen and pelvis showed no acute findings as well.  Gallbladder was unremarkable.  EKG showed normal sinus rhythm with no change from previous.  During patient's admission labs were monitored electrolytes replaced.  Patient completed echocardiogram and stress test both normal.  Patient has had no further palpitations or  chest pain.  Patient's A1c was normal.  Patient did have some elevated triglycerides at 319 but

## 2025-05-20 NOTE — CARE COORDINATION
Case Management Assessment  Initial Evaluation    Date/Time of Evaluation: 5/20/2025 10:53 AM  Assessment Completed by: TOÑO Galdamez    If patient is discharged prior to next notation, then this note serves as note for discharge by case management.    Patient Name: Rupal Roach                   YOB: 1950  Diagnosis: Chest pain in adult [R07.9]  Chest pain, unspecified type [R07.9]                   Date / Time: 5/19/2025  3:46 AM    Patient Admission Status: Observation   Readmission Risk (Low < 19, Mod (19-27), High > 27): No data recorded  Current PCP: Kulwinder Schrader MD  PCP verified by CM? Yes    Chart Reviewed: Yes      History Provided by: Patient, Medical Record  Patient Orientation: Alert and Oriented, Person, Place, Situation, Self    Patient Cognition: Alert    Hospitalization in the last 30 days (Readmission):  No    If yes, Readmission Assessment in  Navigator will be completed.    Advance Directives:      Code Status: Full Code   Patient's Primary Decision Maker is: Legal Next of Kin    Primary Decision Maker: Beto Roach - Spouse - [phone]    Discharge Planning:    Patient lives with: Spouse/Significant Other Type of Home: House  Primary Care Giver: Self  Patient Support Systems include: Spouse/Significant Other, Children, Family Members, [employer], Friends/Neighbors   Current Financial resources: Medicare  Current community resources: None  Current services prior to admission: Durable Medical Equipment            Current DME: Cane, Walker, Shower Chair, Home Aerosol (grab bars)            Type of Home Care services:  None    ADLS  Prior functional level: Assistance with the following:, Housework, Cooking, Shopping, Mobility  Current functional level: Assistance with the following:, Cooking, Housework, Shopping, Mobility    PT AM-PAC:   /24  OT AM-PAC:   /24    Family can provide assistance at DC: Yes  Would you like Case Management to discuss

## 2025-05-20 NOTE — PROGRESS NOTES
Progress Note    SUBJECTIVE:    Patient seen for f/u of Chest pain in adult.  She resting in bed no distress.  No further chest pain or palpitations.  Encouraged to take BP machine from home to PCP office for comparison     ROS:   Constitutional: negative  for fevers, and negative for chills.  Respiratory: negative for shortness of breath, negative for cough, and negative for wheezing  Cardiovascular: negative for chest pain, and negative for palpitations  Gastrointestinal: negative for abdominal pain, negative for nausea,negative for vomiting, negative for diarrhea, and negative for constipation     All other systems were reviewed with the patient and are negative unless otherwise stated in HPI      OBJECTIVE:      Vitals:   Vitals:    05/20/25 0815   BP:    Pulse:    Resp: 20   Temp:    SpO2:      Weight - Scale: 86.6 kg (190 lb 14.7 oz)   Height: 160 cm (5' 2.99\")     Weight  Wt Readings from Last 3 Encounters:   05/20/25 86.6 kg (190 lb 14.7 oz)   11/29/22 84.8 kg (187 lb)   06/30/22 85.7 kg (189 lb)     Body mass index is 33.83 kg/m².    24HR INTAKE/OUTPUT:      Intake/Output Summary (Last 24 hours) at 5/20/2025 1045  Last data filed at 5/20/2025 0900  Gross per 24 hour   Intake 1390 ml   Output --   Net 1390 ml     -----------------------------------------------------------------  Exam:    GEN:    Awake, alert and oriented x3.   EYES:  EOMI, pupils equal   NECK: Supple. No lymphadenopathy.  No carotid bruit  CVS:    regular rate and rhythm, no audible murmur  PULM:  CTA, no wheezes, rales or rhonchi, no acute respiratory distress  ABD:    Bowels sounds normal.  Abdomen is soft.  No distention.  no tenderness to palpation.   EXT:   no edema bilaterally .  No calf tenderness.   NEURO: Moves all extremities.  Motor and sensory are grossly intact  SKIN:  No rashes.  No skin lesions.    -----------------------------------------------------------------    Diagnostic Data:      Complete Blood Count:   Recent Labs

## 2025-05-20 NOTE — PROGRESS NOTES
[facility]  Pharmacy Department    Clinical Pharmacy Note-Warfarin Follow-up       Patient:  Rupal Roach  MRN: [id number]    Warfarin consult follow-up:    INR (no units)   Date Value   05/20/2025 2.5   05/19/2025 2.7   04/05/2024 2.0   12/14/2023 2.1   11/29/2022 2.7   11/28/2022 1.9   11/27/2022 1.6       Hemoglobin (g/dL)   Date Value   05/20/2025 12.7   05/19/2025 13.7   04/05/2024 13.0     Hematocrit (%)   Date Value   05/20/2025 38.6   05/19/2025 41.7   04/05/2024 39.2     Platelet Count (k/uL)   Date Value   12/09/2011 262     Platelets (k/uL)   Date Value   05/20/2025 229   05/19/2025 253   04/05/2024 227       Target INR Range: 2 - 3    Significant Drug-Drug Interactions:  New warfarin drug-drug interactions: Aspirin, Percocet, Tyleol  Discontinued drug-drug interactions: none      Notes:                     Warfarin 3.75 mg PO x 1 today for INR 2.5 (home dose).  Daily PT/INR until stable within therapeutic range.     Thank you,  Evelia Escalante RP,   5/20/2025, 9:26 AM

## 2025-05-20 NOTE — PROGRESS NOTES
Comprehensive Nutrition Assessment    Type and Reason for Visit:       Nutrition Recommendations/Plan:   Continue  current diet.   Meet >75% of estimated needs.   Gave triglyceride education      Malnutrition Assessment:  Malnutrition Status:  No malnutrition (05/20/25 0818)    Context:  Acute Illness     Findings of the 6 clinical characteristics of malnutrition:  Energy Intake:  75% or less of estimated energy requirements for 7 or more days  Weight Loss:  No weight loss     Body Fat Loss:  No body fat loss     Muscle Mass Loss:  No muscle mass loss    Fluid Accumulation:  No fluid accumulation     Strength:  Not Performed    Nutrition Assessment:    Predicated inadequate energy intake r/t impaired respiratory function aeb pt consuming around 51-75% of her meals the week prior to admission. Pt reported due to her respiratory infection she was not able to eat much throughout the day consuming toast, yogurt, and <75% of her meals. Since being admitted her appetite has increased consuming an average of 75% of her meals. Labs reviewed elevated glucose of 100 with an A1c of 5.6% recorded in May. Triglycerides are elevated to 319. Education was given on how to lower triglyceride levels.    Nutrition Related Findings:    Active bowel sounds and no edema. Wound Type: None       Current Nutrition Intake & Therapies:    Average Meal Intake: %  Average Supplements Intake: None Ordered  ADULT DIET; Regular    Anthropometric Measures:  Height: 160 cm (5' 2.99\")  Ideal Body Weight (IBW): 115 lbs (52 kg)    Admission Body Weight: 86.3 kg (190 lb 4.1 oz)  Current Body Weight: 86.6 kg (190 lb 14.7 oz), 166 % IBW. Weight Source: Bed scale  Current BMI (kg/m2): 33.8  Usual Body Weight: 85.7 kg (189 lb) (Pt reported)     % Weight Change (Calculated): 1                    BMI Categories: Obese Class 1 (BMI 30.0-34.9)  Hematology:  Recent Labs     05/19/25  0356 05/20/25  0600   WBC 8.6 7.2   HGB 13.7 12.7   HCT 41.7 38.6

## 2025-05-20 NOTE — PLAN OF CARE
Problem: Discharge Planning  Goal: Discharge to home or other facility with appropriate resources  Outcome: Completed     Problem: Pain  Goal: Verbalizes/displays adequate comfort level or baseline comfort level  Outcome: Completed     Problem: Safety - Adult  Goal: Free from fall injury  Outcome: Completed     Problem: ABCDS Injury Assessment  Goal: Absence of physical injury  Outcome: Completed     Problem: Cardiovascular - Adult  Goal: Maintains optimal cardiac output and hemodynamic stability  Outcome: Completed  Goal: Absence of cardiac dysrhythmias or at baseline  Outcome: Completed     Problem: Nutrition Deficit:  Goal: Optimize nutritional status  5/20/2025 1115 by Becca Hills, RN  Outcome: Completed  5/20/2025 0822 by Domonique Arreola  Outcome: Progressing  Flowsheets (Taken 5/20/2025 0822)  Nutrient intake appropriate for improving, restoring, or maintaining nutritional needs: Monitor oral intake, labs, and treatment plans  Note: Nutrition Problem #1: Predicted inadequate energy intake  Intervention: Food and/or Nutrient Delivery: Continue Current Diet

## 2025-05-20 NOTE — DISCHARGE INSTR - DIET
Good nutrition is important when healing from an illness, injury, or surgery.  Follow any nutrition recommendations given to you during your hospital stay.   If you were given an oral nutrition supplement while in the hospital, continue to take this supplement at home.  You can take it with meals, in-between meals, and/or before bedtime. These supplements can be purchased at most local grocery stores, pharmacies, and chain [employer]-stores.   If you have any questions about your diet or nutrition, call the hospital and ask for the dietitian.  Low fat diet.  See handout

## 2025-05-20 NOTE — PROGRESS NOTES
Vitals and assessment complete at this time. See flowsheets for details. Vitals are WNL. Patient is resting in bed. Patient is A&O x4. Breathing is regular and unlabored. Lung sounds are clear. Patient currently denies chest pain, SOB, palpitations, and lightheadedness or dizziness. Patient does report left jaw/ear pain and is requesting pain medication which is given at this time, see MAR. Patient denies further needs at this time. Call light is within reach. Care ongoing.

## 2025-05-20 NOTE — PROGRESS NOTES
Patient is leaving the floor at this time by wheelchair. Patient's  is driving her home. Patient discharged.

## 2025-05-28 ENCOUNTER — TELEPHONE (OUTPATIENT)
Dept: INFUSION THERAPY | Age: 75
End: 2025-05-28

## 2025-05-28 NOTE — TELEPHONE ENCOUNTER
This RN called patient to schedule her Prolia injection.Pt states \" I can't afford what they charge me at the hospital. They charge me $300 and then bill my insurance for $8,000. This RN inquired is she was interested in filling out financial assistance paperwork, she states \"no. I talked to my doctor and he is going to put me on pills\".